# Patient Record
Sex: FEMALE | Race: WHITE | NOT HISPANIC OR LATINO | Employment: OTHER | URBAN - METROPOLITAN AREA
[De-identification: names, ages, dates, MRNs, and addresses within clinical notes are randomized per-mention and may not be internally consistent; named-entity substitution may affect disease eponyms.]

---

## 2017-01-03 ENCOUNTER — APPOINTMENT (OUTPATIENT)
Dept: OCCUPATIONAL THERAPY | Facility: CLINIC | Age: 72
End: 2017-01-03
Payer: COMMERCIAL

## 2017-01-03 PROCEDURE — 97140 MANUAL THERAPY 1/> REGIONS: CPT

## 2017-01-03 PROCEDURE — 97110 THERAPEUTIC EXERCISES: CPT

## 2017-01-05 ENCOUNTER — APPOINTMENT (OUTPATIENT)
Dept: OCCUPATIONAL THERAPY | Facility: CLINIC | Age: 72
End: 2017-01-05
Payer: COMMERCIAL

## 2017-01-05 ENCOUNTER — GENERIC CONVERSION - ENCOUNTER (OUTPATIENT)
Dept: OTHER | Facility: OTHER | Age: 72
End: 2017-01-05

## 2017-01-05 PROCEDURE — 97110 THERAPEUTIC EXERCISES: CPT

## 2017-01-05 PROCEDURE — 97140 MANUAL THERAPY 1/> REGIONS: CPT

## 2017-01-09 ENCOUNTER — APPOINTMENT (OUTPATIENT)
Dept: OCCUPATIONAL THERAPY | Facility: CLINIC | Age: 72
End: 2017-01-09
Payer: COMMERCIAL

## 2017-01-09 ENCOUNTER — ALLSCRIPTS OFFICE VISIT (OUTPATIENT)
Dept: OTHER | Facility: OTHER | Age: 72
End: 2017-01-09

## 2017-01-09 ENCOUNTER — HOSPITAL ENCOUNTER (OUTPATIENT)
Dept: RADIOLOGY | Facility: CLINIC | Age: 72
Discharge: HOME | End: 2017-01-09
Payer: COMMERCIAL

## 2017-01-09 DIAGNOSIS — S52.121A CLOSED DISPLACED FRACTURE OF HEAD OF RIGHT RADIUS: ICD-10-CM

## 2017-01-09 PROCEDURE — 97140 MANUAL THERAPY 1/> REGIONS: CPT

## 2017-01-09 PROCEDURE — 73070 X-RAY EXAM OF ELBOW: CPT

## 2017-01-09 PROCEDURE — 97110 THERAPEUTIC EXERCISES: CPT

## 2017-01-10 ENCOUNTER — APPOINTMENT (OUTPATIENT)
Dept: OCCUPATIONAL THERAPY | Facility: CLINIC | Age: 72
End: 2017-01-10
Payer: COMMERCIAL

## 2017-01-11 ENCOUNTER — APPOINTMENT (OUTPATIENT)
Dept: OCCUPATIONAL THERAPY | Facility: CLINIC | Age: 72
End: 2017-01-11
Payer: COMMERCIAL

## 2017-01-12 ENCOUNTER — APPOINTMENT (OUTPATIENT)
Dept: OCCUPATIONAL THERAPY | Facility: CLINIC | Age: 72
End: 2017-01-12
Payer: COMMERCIAL

## 2017-01-12 PROCEDURE — 97110 THERAPEUTIC EXERCISES: CPT

## 2017-01-12 PROCEDURE — 97140 MANUAL THERAPY 1/> REGIONS: CPT

## 2017-01-16 ENCOUNTER — APPOINTMENT (OUTPATIENT)
Dept: OCCUPATIONAL THERAPY | Facility: CLINIC | Age: 72
End: 2017-01-16
Payer: COMMERCIAL

## 2017-01-17 ENCOUNTER — APPOINTMENT (OUTPATIENT)
Dept: OCCUPATIONAL THERAPY | Facility: CLINIC | Age: 72
End: 2017-01-17
Payer: COMMERCIAL

## 2017-01-17 PROCEDURE — 97110 THERAPEUTIC EXERCISES: CPT

## 2017-01-18 ENCOUNTER — APPOINTMENT (OUTPATIENT)
Dept: OCCUPATIONAL THERAPY | Facility: CLINIC | Age: 72
End: 2017-01-18
Payer: COMMERCIAL

## 2017-01-19 ENCOUNTER — APPOINTMENT (OUTPATIENT)
Dept: OCCUPATIONAL THERAPY | Facility: CLINIC | Age: 72
End: 2017-01-19
Payer: COMMERCIAL

## 2017-01-19 PROCEDURE — 97140 MANUAL THERAPY 1/> REGIONS: CPT

## 2017-01-19 PROCEDURE — 97110 THERAPEUTIC EXERCISES: CPT

## 2017-01-20 ENCOUNTER — APPOINTMENT (OUTPATIENT)
Dept: OCCUPATIONAL THERAPY | Facility: CLINIC | Age: 72
End: 2017-01-20
Payer: COMMERCIAL

## 2017-01-23 ENCOUNTER — APPOINTMENT (OUTPATIENT)
Dept: OCCUPATIONAL THERAPY | Facility: CLINIC | Age: 72
End: 2017-01-23
Payer: COMMERCIAL

## 2017-01-24 ENCOUNTER — APPOINTMENT (OUTPATIENT)
Dept: OCCUPATIONAL THERAPY | Facility: CLINIC | Age: 72
End: 2017-01-24
Payer: COMMERCIAL

## 2017-01-24 PROCEDURE — 97140 MANUAL THERAPY 1/> REGIONS: CPT

## 2017-01-24 PROCEDURE — 97110 THERAPEUTIC EXERCISES: CPT

## 2017-01-25 ENCOUNTER — APPOINTMENT (OUTPATIENT)
Dept: OCCUPATIONAL THERAPY | Facility: CLINIC | Age: 72
End: 2017-01-25
Payer: COMMERCIAL

## 2017-01-26 ENCOUNTER — APPOINTMENT (OUTPATIENT)
Dept: OCCUPATIONAL THERAPY | Facility: CLINIC | Age: 72
End: 2017-01-26
Payer: COMMERCIAL

## 2017-01-26 PROCEDURE — 97110 THERAPEUTIC EXERCISES: CPT

## 2017-01-27 ENCOUNTER — APPOINTMENT (OUTPATIENT)
Dept: OCCUPATIONAL THERAPY | Facility: CLINIC | Age: 72
End: 2017-01-27
Payer: COMMERCIAL

## 2017-01-31 ENCOUNTER — APPOINTMENT (OUTPATIENT)
Dept: OCCUPATIONAL THERAPY | Facility: CLINIC | Age: 72
End: 2017-01-31
Payer: COMMERCIAL

## 2017-01-31 PROCEDURE — 97140 MANUAL THERAPY 1/> REGIONS: CPT

## 2017-01-31 PROCEDURE — 97110 THERAPEUTIC EXERCISES: CPT

## 2017-02-02 ENCOUNTER — APPOINTMENT (OUTPATIENT)
Dept: OCCUPATIONAL THERAPY | Facility: CLINIC | Age: 72
End: 2017-02-02
Payer: COMMERCIAL

## 2017-02-02 PROCEDURE — 97140 MANUAL THERAPY 1/> REGIONS: CPT

## 2017-02-02 PROCEDURE — 97110 THERAPEUTIC EXERCISES: CPT

## 2017-02-07 ENCOUNTER — GENERIC CONVERSION - ENCOUNTER (OUTPATIENT)
Dept: OTHER | Facility: OTHER | Age: 72
End: 2017-02-07

## 2017-05-22 ENCOUNTER — GENERIC CONVERSION - ENCOUNTER (OUTPATIENT)
Dept: OTHER | Facility: OTHER | Age: 72
End: 2017-05-22

## 2017-06-02 LAB
A/G RATIO (HISTORICAL): 1.5 (ref 1.2–2.2)
ALBUMIN SERPL BCP-MCNC: 4.3 G/DL (ref 3.5–4.8)
ALP SERPL-CCNC: 90 IU/L (ref 39–117)
ALT SERPL W P-5'-P-CCNC: 19 IU/L (ref 0–32)
AST SERPL W P-5'-P-CCNC: 19 IU/L (ref 0–40)
BILIRUB SERPL-MCNC: 0.4 MG/DL (ref 0–1.2)
BUN SERPL-MCNC: 16 MG/DL (ref 8–27)
BUN/CREA RATIO (HISTORICAL): 20 (ref 12–28)
CALCIUM SERPL-MCNC: 9.5 MG/DL (ref 8.7–10.3)
CHLORIDE SERPL-SCNC: 102 MMOL/L (ref 96–106)
CO2 SERPL-SCNC: 24 MMOL/L (ref 18–29)
CREAT SERPL-MCNC: 0.82 MG/DL (ref 0.57–1)
EGFR AFRICAN AMERICAN (HISTORICAL): 84 ML/MIN/1.73
EGFR-AMERICAN CALC (HISTORICAL): 73 ML/MIN/1.73
GLUCOSE SERPL-MCNC: 109 MG/DL (ref 65–99)
HBA1C MFR BLD HPLC: 6 % (ref 4.8–5.6)
POTASSIUM SERPL-SCNC: 5.3 MMOL/L (ref 3.5–5.2)
SODIUM SERPL-SCNC: 145 MMOL/L (ref 134–144)
TOT. GLOBULIN, SERUM (HISTORICAL): 2.8 G/DL (ref 1.5–4.5)
TOTAL PROTEIN (HISTORICAL): 7.1 G/DL (ref 6–8.5)

## 2017-06-03 LAB — TSH SERPL DL<=0.05 MIU/L-ACNC: 0.78 UIU/ML (ref 0.45–4.5)

## 2017-06-05 ENCOUNTER — GENERIC CONVERSION - ENCOUNTER (OUTPATIENT)
Dept: OTHER | Facility: OTHER | Age: 72
End: 2017-06-05

## 2017-06-23 ENCOUNTER — ALLSCRIPTS OFFICE VISIT (OUTPATIENT)
Dept: OTHER | Facility: OTHER | Age: 72
End: 2017-06-23

## 2017-06-23 DIAGNOSIS — Z12.31 ENCOUNTER FOR SCREENING MAMMOGRAM FOR MALIGNANT NEOPLASM OF BREAST: ICD-10-CM

## 2017-10-19 ENCOUNTER — ALLSCRIPTS OFFICE VISIT (OUTPATIENT)
Dept: OTHER | Facility: OTHER | Age: 72
End: 2017-10-19

## 2017-11-28 ENCOUNTER — GENERIC CONVERSION - ENCOUNTER (OUTPATIENT)
Dept: OTHER | Facility: OTHER | Age: 72
End: 2017-11-28

## 2017-11-28 ENCOUNTER — HOSPITAL ENCOUNTER (OUTPATIENT)
Dept: RADIOLOGY | Facility: HOSPITAL | Age: 72
Discharge: HOME | End: 2017-11-28
Attending: FAMILY MEDICINE
Payer: COMMERCIAL

## 2017-11-28 DIAGNOSIS — Z12.31 ENCOUNTER FOR SCREENING MAMMOGRAM FOR MALIGNANT NEOPLASM OF BREAST: ICD-10-CM

## 2017-11-28 PROCEDURE — G0202 SCR MAMMO BI INCL CAD: HCPCS

## 2017-12-04 ENCOUNTER — GENERIC CONVERSION - ENCOUNTER (OUTPATIENT)
Dept: OTHER | Facility: OTHER | Age: 72
End: 2017-12-04

## 2017-12-04 LAB
A/G RATIO (HISTORICAL): 1.4 (ref 1.2–2.2)
ALBUMIN SERPL BCP-MCNC: 4.2 G/DL (ref 3.5–4.8)
ALP SERPL-CCNC: 81 IU/L (ref 39–117)
ALT SERPL W P-5'-P-CCNC: 18 IU/L (ref 0–32)
AST SERPL W P-5'-P-CCNC: 16 IU/L (ref 0–40)
BILIRUB SERPL-MCNC: 0.4 MG/DL (ref 0–1.2)
BUN SERPL-MCNC: 12 MG/DL (ref 8–27)
BUN/CREA RATIO (HISTORICAL): 16 (ref 12–28)
CALCIUM SERPL-MCNC: 9.3 MG/DL (ref 8.7–10.3)
CHLORIDE SERPL-SCNC: 97 MMOL/L (ref 96–106)
CHOLEST SERPL-MCNC: 161 MG/DL (ref 100–199)
CO2 SERPL-SCNC: 26 MMOL/L (ref 18–29)
CREAT SERPL-MCNC: 0.77 MG/DL (ref 0.57–1)
EGFR AFRICAN AMERICAN (HISTORICAL): 90 ML/MIN/1.73
EGFR-AMERICAN CALC (HISTORICAL): 78 ML/MIN/1.73
GLUCOSE SERPL-MCNC: 103 MG/DL (ref 65–99)
HBA1C MFR BLD HPLC: 5.7 % (ref 4.8–5.6)
HDLC SERPL-MCNC: 44 MG/DL
LDLC SERPL CALC-MCNC: 94 MG/DL (ref 0–99)
POTASSIUM SERPL-SCNC: 4.6 MMOL/L (ref 3.5–5.2)
SODIUM SERPL-SCNC: 139 MMOL/L (ref 134–144)
TOT. GLOBULIN, SERUM (HISTORICAL): 3 G/DL (ref 1.5–4.5)
TOTAL PROTEIN (HISTORICAL): 7.2 G/DL (ref 6–8.5)
TRIGL SERPL-MCNC: 113 MG/DL (ref 0–149)

## 2017-12-05 ENCOUNTER — GENERIC CONVERSION - ENCOUNTER (OUTPATIENT)
Dept: OTHER | Facility: OTHER | Age: 72
End: 2017-12-05

## 2017-12-05 LAB
INTERPRETATION (HISTORICAL): NORMAL
LYME IGG/IGM AB (HISTORICAL): <0.91 ISR (ref 0–0.9)
LYME IGM (HISTORICAL): <0.8 INDEX (ref 0–0.79)
TSH SERPL DL<=0.05 MIU/L-ACNC: 1.14 UIU/ML (ref 0.45–4.5)

## 2017-12-06 ENCOUNTER — GENERIC CONVERSION - ENCOUNTER (OUTPATIENT)
Dept: OTHER | Facility: OTHER | Age: 72
End: 2017-12-06

## 2017-12-15 ENCOUNTER — ALLSCRIPTS OFFICE VISIT (OUTPATIENT)
Dept: OTHER | Facility: OTHER | Age: 72
End: 2017-12-15

## 2017-12-16 NOTE — PROGRESS NOTES
Assessment  1  Benign essential hypertension (401 1) (I10)   2  GE reflux (530 81) (K21 9)   3  Hyperlipidemia LDL goal <130 (272 4) (E78 5)   4  Hypothyroidism (244 9) (E03 9)   5  IFG (impaired fasting glucose) (790 21) (R73 01)   6  Encounter for preventive health examination (V70 0) (Z00 00)    Plan  Benign essential hypertension, Health Maintenance, GE reflux, Hyperlipidemia LDL goal<130, Hypothyroidism, IFG (impaired fasting glucose)    · (1) COMPREHENSIVE METABOLIC PANEL; Status:Active; Requested for:29Pxz3108;    · (1) LIPID PANEL FASTING W DIRECT LDL REFLEX; Status:Active; Requestedfor:76Wcf3138;    · (1) TSH; Status:Active; Requested for:38Yqz2666;   Screening for genitourinary condition    · *VB - Urinary Incontinence Screen (Dx Z13 89 Screen for UI); Status:Complete;   Done:15Vgk2824 09:17AM    Discussion/Summary  The patient was counseled regarding risk factor reductions,-- impressions,-- risks and benefits of treatment options  Possible side effects of new medications were reviewed with the patient/guardian today  The treatment plan was reviewed with the patient/guardian  The patient/guardian understands and agrees with the treatment plan      Chief Complaint  Seen for a f/u on Meds  er/cma  History of Present Illness  htn/chol/thyroid stablefollowing diet somewhatnot counting caloriesoccas exerciseno cpno myalgiasno palpitations      Review of Systems   Constitutional: no fever-- and-- no chills  Neurological: no dizziness-- and-- no fainting  Preventive Quality 65 and Older: The patient currently has no urinary incontinence symptoms  Active Problems  1  Alopecia (704 00) (L65 9)   2  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)   3  Benign essential hypertension (401 1) (I10)   4  BMI 30 0-30 9,adult (V85 30) (Z68 30)   5  Cataract (366 9) (H26 9)   6  Cervical radiculopathy (723 4) (M54 12)   7  Colon cancer screening (V76 51) (Z12 11)   8  Depression screening (V79 0) (Z13 89)   9   GE reflux Occupational Therapy Discharge Summary    Pt d/c home at overall Mod I level with use of w/c as primary means of mobility  Pt participated in prosthetic training and recommended continued OP services to max indep with use of prosthetic for ADL fxn and xfers  Pt with no further OT needs at this time  (530 81) (K21 9)   10  Hyperlipidemia LDL goal <130 (272 4) (E78 5)   11  Hypothyroidism (244 9) (E03 9)   12  IFG (impaired fasting glucose) (790 21) (R73 01)   13  Immunization due (V05 9) (Z23)   14  Menopause (627 2) (Z78 0)   15  Obesity (BMI 30-39 9) (278 00) (E66 9)   16  Osteopenia (733 90) (M85 80)   17  Other screening mammogram (V76 12) (Z12 31)   18  Screening for cardiovascular, respiratory, and genitourinary diseases  (V81 2,V81 4,V81 6) (Z13 6,Z13 83,Z13 89)   19  Screening for neurological condition (V80 09) (Z13 89)    Past Medical History  1  History of Acute upper respiratory infection (465 9) (J06 9)   2  History of Acute upper respiratory infection (465 9) (J06 9)   3  History of 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)   4  History of Backache (724 5) (M54 9)   5  History of Fracture of radial head, right, closed (813 05) (S52 121A)   6  History of abdominal pain (V13 89) (Z87 898)   7  History of acute bronchitis (V12 69) (Z87 09)   8  History of acute bronchitis (V12 69) (Z87 09)   9  History of backache (V13 59) (Z87 39)   10  History of low back pain (V13 59) (Z87 39)   11  History of onychomycosis (V12 09) (Z86 19)   12  History of pleurisy (V12 69) (Z87 09)   13  History of Joint pain, knee (719 46) (M25 569)   14  History of Late effect of sprain and strain (905 7)   15  History of Late Effects Of Sprain Or Strain (905 7)   16  Otitis media, unspecified laterality   17  Preop examination (V72 84) (Z01 818)   18  History of Screening for malignant neoplasm of breast (V76 10) (Z12 31)   19  History of Screening for malignant neoplasm of breast (V76 10) (Z12 31)   20  History of Sprained Ribs (848 3)   21  History of Tick bite, initial encounter (919 4,E906 4) (W57 XXXA)   22  History of Tinea corporis (110 5) (B35 4)    Surgical History  1  History of Cataract Surgery    Family History  Sister    1   Family history of diabetes mellitus (V18 0) (Z83 3)    The family history was reviewed and updated today  Social History   · Never a smoker  The social history was reviewed and updated today  Current Meds   1  AmLODIPine Besylate 5 MG Oral Tablet; Take 1 tablet daily; Therapy: 65BTQ5962 to (Evaluate:18Jun2018)  Requested for: 03ABK6867; Last Rx:23Jun2017 Ordered   2  Atenolol 25 MG Oral Tablet; take 1 tablet by mouth once daily; Therapy: 86WIS4515 to (Evaluate:10Mar2018)  Requested for: 68QGM7947; Last Rx:15Mar2017 Ordered   3  Atorvastatin Calcium 20 MG Oral Tablet; take 1 tablet by mouth once daily; Therapy: 48PLT1207 to (Evaluate:10Jun2018)  Requested for: 55YMH8949; Last Rx:15Jun2017 Ordered   4  Levothyroxine Sodium 100 MCG Oral Tablet; take 1 tablet by mouth once daily; Therapy: 48Zde1932 to (Melissa Cai)  Requested for: 78RWP6502; Last Rx:26Nov2017 Ordered   5  Losartan Potassium-HCTZ 100-12 5 MG Oral Tablet; take 1 tablet by mouth once daily; Therapy: 65Sse7085 to (Evaluate:72Qpt6908)  Requested for: 59DJQ3418; Last Rx:21Nyx3643 Ordered    Allergies  1  Amoxicillin TABS    Vitals  Vital Signs    Recorded: 89GFU3182 09:15AM   Temperature 96 8 F   Heart Rate 76   Respiration 20   Systolic 246   Diastolic 80   Height 5 ft 2 in   Weight 171 lb    BMI Calculated 31 28   BSA Calculated 1 79     Physical Exam   Constitutional  General appearance: No acute distress, well appearing and well nourished  Eyes  Conjunctiva and lids: No swelling, erythema or discharge  Pupils and irises: Equal, round and reactive to light  Ears, Nose, Mouth, and Throat  External inspection of ears and nose: Normal    Otoscopic examination: Tympanic membranes translucent with normal light reflex  Canals patent without erythema  Nasal mucosa, septum, and turbinates: Normal without edema or erythema  Oropharynx: Normal with no erythema, edema, exudate or lesions  Pulmonary  Respiratory effort: No increased work of breathing or signs of respiratory distress  Auscultation of lungs: Clear to auscultation  Cardiovascular  Auscultation of heart: Normal rate and rhythm, normal S1 and S2, without murmurs  Examination of extremities for edema and/or varicosities: Normal    Carotid pulses: Normal    Abdomen  Abdomen: Non-tender, no masses  Lymphatic  Palpation of lymph nodes in neck: No lymphadenopathy  Musculoskeletal  Gait and station: Normal    Digits and nails: Normal without clubbing or cyanosis  Skin  Skin and subcutaneous tissue: Normal without rashes or lesions  Psychiatric  Mood and affect: Normal          Results/Data  PHQ-2 Adult Depression Screening 52Noi8062 09:17AM User, [facility]     Test Name Result Flag Reference   PHQ-2 Adult Depression Score 0     Over the last two weeks, how often have you been bothered by any of the following problems? Little interest or pleasure in doing things: Not at all - 0 Feeling down, depressed, or hopeless: Not at all - 0   PHQ-2 Adult Depression Screening Negative       Falls Risk Assessment (Dx Z13 89 Screen for Neurologic Disorder) 94BZK8910 09:17AM User, [facility]     Test Name Result Flag Reference   Falls Risk      No falls in the past year     *VB - Urinary Incontinence Screen (Dx Z13 89 Screen for UI) 06HDL6153 09:17AM Yvette Weiner     Test Name Result Flag Reference   Urinary Incontinence Assessment 74IYS3076         Provider Comments    htn/chol/thyroid stableprediabetes aware/advisedlabs reviewedDiet/exercise/weight loss is recommended  Health Management  Menopause   * MAMMO SCREENING BILATERAL W CAD; every 1 year; Last 15RBH0759; Next Due: 34SVW7438; Active  Other screening mammogram   * MAMMO SCREENING BILATERAL W CAD; every 1 year; Last 69DUU5161; Next Due: 25JEY0977; Active    Future Appointments    Date/Time Provider Specialty Site   06/18/2018 10:45 AM Yvette Weiner, [address] [place] [address]     Signatures   Electronically signed by : Sheba Epstein DO; Dec 15 2017  9:59PM EST                       (Author)

## 2018-01-09 NOTE — RESULT NOTES
Verified Results  * XR ELBOW 3+ VIEW RIGHT 63DOT8752 02:52PM Saravanan Ken     Test Name Result Flag Reference   XR ELBOW 3+ VW RIGHT (Report)     C-ARM - RIGHT ELBOW     INDICATION: Right radial head arthroplasty  COMPARISON: 11/27/2016  TECHNIQUE:     FLUOROSCOPY TIME: 14 2 seconds     3 FLUOROSCOPIC IMAGES     FINDINGS:     Fluoroscopy is provided via the C-arm for placement of a radial head prosthesis for comminuted fracture  Alignment is anatomic for healing  Osseous and soft tissue detail limited by technique  IMPRESSION:     Portable fluoroscopy provided for radial head prosthesis placement  Please refer to the separate procedure notes for additional details         Workstation performed: [id number]     Signed by:   Sunshine Garcia MD   12/6/16

## 2018-01-11 NOTE — RESULT NOTES
Verified Results  * MRI CERVICAL SPINE WO CONTRAST 00QAZ3243 07:46AM Keegan Wallace     Test Name Result Flag Reference   MRI CERVICAL SPINE [address] (Report)     This is a summary report  The complete report is available in the patient's medical record  If you cannot access the medical record, please contact the sending organization for a detailed fax or copy  MRI CERVICAL SPINE WITHOUT CONTRAST     INDICATION: Neck pain  Left arm numbness and tingling for one week  COMPARISON: None  TECHNIQUE: Sagittal T1, sagittal T2, sagittal inversion recovery, axial T2, axial 2D merge        IMAGE QUALITY: Mild motion artifact partially limits the study  FINDINGS:     ALIGNMENT: There is nonspecific straightening of the cervical lordosis without subluxation  MARROW SIGNAL: Mild scattered degenerative endplate changes noted  No bone marrow edema  No focally suspicious marrow lesions or compression abnormality  CERVICAL AND VISUALIZED THORACIC CORD: Normal signal within the visualized cord  PREVERTEBRAL AND PARASPINAL SOFT TISSUES: Prevertebral and paraspinal soft tissues are unremarkable  VISUALIZED POSTERIOR FOSSA: The visualized posterior fossa demonstrates no abnormal signal      CERVICAL DISC SPACES:        C2-C3: Normal      C3-C4: There is a disc osteophyte complex  Mild tricompartmental narrowing  C4-C5: There is a disc osteophyte complex  Very mild tricompartmental narrowing  C5-C6: There is a disc osteophyte complex  Mild to moderate central canal narrowing  Moderate left neural foraminal narrowing  Mild right neural foraminal narrowing  C6-C7: Small central disc herniation, protrusion type  Mild central canal narrowing  Mild bilateral uncovertebral hypertrophy and mild bilateral neural foraminal narrowing  C7-T1: Normal      UPPER THORACIC DISC SPACES: Normal        IMPRESSION:       1   Mild to moderate multilevel spondylosis without cord compression or cord signal abnormality  2  There is nonspecific straightening of the cervical lordosis without subluxation  Workstation performed: [id number]     Signed by: Mitchell Gusman MD   3/3/16       Discussion/Summary   MRI of neck showed some arthritis changes but no evidence of "pinched nerve"    Dr Pretty Horn

## 2018-01-13 VITALS
SYSTOLIC BLOOD PRESSURE: 148 MMHG | HEIGHT: 62 IN | TEMPERATURE: 98.5 F | WEIGHT: 169 LBS | RESPIRATION RATE: 20 BRPM | BODY MASS INDEX: 31.1 KG/M2 | DIASTOLIC BLOOD PRESSURE: 90 MMHG | HEART RATE: 60 BPM

## 2018-01-13 NOTE — RESULT NOTES
Discussion/Summary   Please keep your office visit as scheduled        Dr Fredda Phoenix        Verified Results  (1) COMPREHENSIVE METABOLIC PANEL 16VPR6149 32:76FS Donnell Lundberg     Test Name Result Flag Reference   Glucose, Serum 109 mg/dL H 65-99   BUN 16 mg/dL  8-27   Creatinine, Serum 0 82 mg/dL  0 57-1 00   BUN/Creatinine Ratio 20  12-28   Sodium, Serum 145 mmol/L H 134-144   Potassium, Serum 5 3 mmol/L H 3 5-5 2   Chloride, Serum 102 mmol/L     Carbon Dioxide, Total 24 mmol/L  18-29   Calcium, Serum 9 5 mg/dL  8 7-10 3   Protein, Total, Serum 7 1 g/dL  6 0-8 5   Albumin, Serum 4 3 g/dL  3 5-4 8   Globulin, Total 2 8 g/dL  1 5-4 5   A/G Ratio 1 5  1 2-2 2   Bilirubin, Total 0 4 mg/dL  0 0-1 2   Alkaline Phosphatase, S 90 IU/L     AST (SGOT) 19 IU/L  0-40   ALT (SGPT) 19 IU/L  0-32   eGFR If NonAfricn Am 73 mL/min/1 73  >59   eGFR If Africn Am 84 mL/min/1 73  >59     (1) HEMOGLOBIN A1C 02Jun2017 07:24AM Donnell Lundberg     Test Name Result Flag Reference   Hemoglobin A1c 6 0 % H 4 8-5 6   Pre-diabetes: 5 7 - 6 4           Diabetes: >6 4           Glycemic control for adults with diabetes: <7 0     (1) TSH 57LFR1162 07:24AM Donnell Lundberg     Test Name Result Flag Reference   TSH 0 779 uIU/mL  0 450-4 500

## 2018-01-14 VITALS
SYSTOLIC BLOOD PRESSURE: 128 MMHG | HEIGHT: 62 IN | DIASTOLIC BLOOD PRESSURE: 76 MMHG | TEMPERATURE: 98.3 F | WEIGHT: 169 LBS | RESPIRATION RATE: 20 BRPM | BODY MASS INDEX: 31.1 KG/M2 | HEART RATE: 76 BPM

## 2018-01-15 NOTE — RESULT NOTES
Verified Results  * DXA BONE DENSITY SPINE HIP AND PELVIS 22Nov2016 10:30AM Della Aguila Order Number: [id number]    Order Number: [id number]     Test Name Result Flag Reference   DXA BONE DENSITY SPINE HIP AND PELVIS (Report)     CENTRAL DXA SCAN     CLINICAL HISTORY:  80year old post-menopausal  female risk factors include estrogen deficient, follow-up     TECHNIQUE: Bone densitometry was performed using a Intelipost bone densitometer  Regions of interest appear properly placed  There are no obvious fractures or other confounding variables which could limit the study  Lumbar dextroscoliosis noted  COMPARISON: 2012     RESULTS:    LUMBAR SPINE: L1, L3 and L4:   BMD 1 480 gm/cm2   T-score 2 4   Z-score 4 0     LEFT TOTAL HIP:   BMD 1 0 85 gm/cm2   T-score 0 6   Z-score 2 1     LEFT FEMORAL NECK:   BMD 0 988 gm/cm2   T-score -0 4   Z-score 1 3     RIGHT TOTAL HIP:   BMD 1 010 gm/cm2   T-score 0 0   Z-score 1 5     RIGHT FEMORAL NECK:   BMD 0 884 gm/cm2   T-score -1 1   Z-score 0 6       IMPRESSION:   1  Based on the [facility] classification, the T-score of -1 1 in the right femoral neck is consistent with low bone mineral density  2  Since the prior study, there has been a significant increase of the BMD in the lumbar spine of 0 079 gm/cm2 or 5 6  In the median hips, there has been a significant increase in BMD of 0 032 gm/cm2 or 3 2%  This increase does exceed our own least    significant change and, therefore, is statistically significant within 95% confidence level  3  Any secondary causes of low bone mineral density should be excluded prior to treatment, if clinically indicated  4  A daily intake of at least 1200 mg calcium and 800 to 1000 IU of Vitamin D, as well as weight bearing and muscle strengthening exercise, fall prevention and avoidance of tobacco and excessive alcohol intake as basic preventive measures are suggested     5  Repeat DXA in 18 - 24 months, on the same machine, as clinically indicated  The 10 year risk of hip fracture is 0 9%, with the 10 year risk of major osteoporotic fracture being 8 6%, as calculated by the [facility] fracture risk assessment tool (FRAX)  The current NOF guidelines recommend treating patients with FRAX 10 year risk score   of >3% for hip fracture and >20% for major osteoporotic fracture  WHO CLASSIFICATION:   Normal (a T-score of -1 0 or higher)   Low bone mineral density (a T-score of less than -1 0 but higher than -2 5)   Osteoporosis (a T-score of -2 5 or less)   Severe osteoporosis (a T-score of -2 5 or less with a fragility fracture)             Workstation performed: [id number]     Signed by:   Manjula Malone MD   11/22/16     * MAMMO SCREENING BILATERAL W CAD 22TBK3055 10:30AM Teebubba Rosas Order Number: [id number]    Order Number: [id number]     Test Name Result Flag Reference   MAMMO SCREENING BILATERAL W CAD (Report)     Patient History:   Patient's BMI is 31 1  Reason for exam: screening (asymptomatic)  Screening     Mammo Screening Bilateral W CAD: November 22, 2016 - Check In #:    [de-identified]   Bilateral CC and MLO view(s) were taken  Technologist: ANDRES Mackey   Prior study comparison: December 17, 2015, [facility] screening    bilateral digital robert performed at [place] [address]  December 16, 2014, bilateral screening mammogram    performed at [place] [address]  December 4, 2013, bilateral screening mammogram performed at [place] [address]  November 29, 2012, bilateral screening   mammogram performed at [place] [address]  November 21, 2011, bilateral screening mammogram performed at [place] [address]  The breast tissue is almost entirely fat  Bilateral digital    mammography is performed  No dominant soft tissue mass,    architectural distortion or suspicious calcifications are noted      The skin and nipple contours are within normal limits  Scattered benign appearing calcifications are noted  No evidence    of malignancy  No significant changes when compared to prior    studies  1  No evidence of malignancy  2  No significant change when compared with the prior study  ASSESSMENT: BiRad:2 - Benign     Recommendation:   Routine screening mammogram of both breasts in 1 year  A reminder letter will be scheduled   Analyzed by CAD     8-10% of cancers will be missed on mammography  Management of a    palpable abnormality must be based on clinical grounds  Patients   will be notified of their results via letter from our facility  Accredited by [employer] of [employer] and FDA  Transcription Location: [facility] 98: [id number]     Risk Value(s):   Tyrer-Cuzick 10 Year: 2 516%, Tyrer-Cuzick Lifetime: 4 305%,    Myriad Table: 1 5%, STEVEN 5 Year: 1 1%, NCI Lifetime: 3 5%   Signed by:   Godfrey Arciniega MD   11/22/16       Discussion/Summary   The DEXA scan showed you bone density is in the category of "osteopenia", which is between normal bone density and osteoporosis  This is a risk for fracture but not considered high-risk  Dr Larisa Allen is normal   Repeat in one year is recommended     Dr Cece Villaseñor

## 2018-01-16 NOTE — RESULT NOTES
Discussion/Summary   Mammogram is normal   Repeat in one year is recommended  Dr Hernandez Rosado CAD 78BUW8326 09:11AM Tessyleena Bettencourt Order Number: [id number]    - Patient Instructions: To schedule this appointment, please contact Central Scheduling at [phone]  Do not wear any perfume, powder, lotion or deodorant on breast or underarm area  Please bring your doctors order, referral (if needed) and insurance information with you on the day of the test  Failure to bring this information may result in this test being rescheduled  Arrive 15 minutes prior to your appointment time to register  On the day of your test, please bring any prior mammogram or breast studies with you that were not performed at a [facility]  Failure to bring prior exams may result in your test needing to be rescheduled  Test Name Result Flag Reference   MAMMO SCREENING BILATERAL W CAD (Report)     Patient History:   Patient's BMI is 31 1  Reason for exam: screening, asymptomatic  Mammo Screening Bilateral W CAD: November 28, 2017 - Check In #:    [de-identified]   Bilateral CC and MLO view(s) were taken  Technologist: FARHAN Mccray   Prior study comparison: November 22, 2016, mammo screening    bilateral W CAD performed at [facility]  December 17, 2015, [facility] screening bilateral digital robert    performed at [facility]  December 16, 2014, bilateral screening mammogram performed at [facility]  December 4, 2013, bilateral screening    mammogram performed at [facility]  November 29, 2012, bilateral screening mammogram performed at [facility]  The breast tissue is almost entirely fat  No new dominant soft    tissue mass, architectural distortion or suspicious    calcifications are noted   The skin and nipple contours are    within normal limits  No evidence of malignancy  No significant changes   when compared with prior studies  ACR BI-RADSï¾® Assessments: BiRad:1 - Negative     Recommendation:   Routine screening mammogram of both breasts in 1 year  Analyzed by CAD     The patient is scheduled in a reminder system for screening    mammography  8-10% of cancers will be missed on mammography  Management of a    palpable abnormality must be based on clinical grounds  Patients   will be notified of their results via letter from our facility  Accredited by [employer] of [employer] and FDA  Transcription Location: [facility] 143: [id number]     Risk Value(s):   Tyrer-Cuzick 10 Year: 2 400%, Tyrer-Cuzick Lifetime: 3 800%,    Myriad Table: 1 5%, STEVEN 5 Year: 1 1%, NCI Lifetime: 3 3%   Signed by:   Daiana Camarillo MD   11/28/17       Plan  Osteoporosis screening, Other screening mammogram    · * MAMMO SCREENING BILATERAL W CAD ; every 1 year;  Last 00EXG6067; Next  46QWH5678; Status:Active

## 2018-01-16 NOTE — RESULT NOTES
Verified Results  [facility]) CBC+Platelet+Hem Review [id number] 09:09AM [facility] Hem     Test Name Result Flag Reference   WBC 6 8 x10E3/uL  3 4-10 8   RBC 4 68 x10E6/uL  3 77-5 28   Hemoglobin 13 9 g/dL  11 1-15 9   Hematocrit 42 9 %  34 0-46  6   MCV 92 fL  79-97   MCH 29 7 pg  26 6-33 0   MCHC 32 4 g/dL  31 5-35 7   RDW 13 7 %  12 3-15 4   Platelets 738 F29E8/PK  150-379   Neutrophils 68 %     Lymphs 24 %     Monocytes 5 %     Eos 2 %     Basos 1 %     Neutrophils Absolute 4 6 X10E3/uL  1 4-7 0   Lymphs (Absolute) 1 6 X10E3/uL  0 7-3 1   Monocytes(Absolute) 0 3 X10E3/uL  0 1-0 9   Eos (Absolute Value) 0 1 X10E3/uL  0 0-0 4   Baso(Absolute) 0 1 X10E3/uL  0 0-0 2   RBC Comment RBC's appear normal   Normal   Platelet Comment Comment  Adequate   Platelet count verified by examination of peripheral blood smear  [facility]) CMP14+eGFR [id number] 09:09AM [facility] Hem     Test Name Result Flag Reference   Glucose, Serum 103 mg/dL H 65-99   BUN 13 mg/dL  8-27   Creatinine, Serum 0 81 mg/dL  0 57-1 00   eGFR If NonAfricn Am 74 mL/min/1 73  >59   eGFR If Africn Am 86 mL/min/1 73  >59   BUN/Creatinine Ratio 16  11-26   Sodium, Serum 141 mmol/L  134-144   Potassium, Serum 5 1 mmol/L  3 5-5 2   Chloride, Serum 100 mmol/L     Carbon Dioxide, Total 25 mmol/L  18-29   Calcium, Serum 9 8 mg/dL  8 7-10 3   Protein, Total, Serum 7 4 g/dL  6 0-8 5   Albumin, Serum 4 3 g/dL  3 6-4 8   Globulin, Total 3 1 g/dL  1 5-4 5   A/G Ratio 1 4  1 1-2 5   Bilirubin, Total 0 3 mg/dL  0 0-1 2   Alkaline Phosphatase, S 87 IU/L     AST (SGOT) 24 IU/L  0-40   ALT (SGPT) 29 IU/L  0-32     (LC) Lipid Panel [id number] 09:09AM Mohit Hem     Test Name Result Flag Reference   Cholesterol, Total 170 mg/dL  100-199   Triglycerides 103 mg/dL  0-149   HDL Cholesterol 49 mg/dL  >39   According to ATP-III Guidelines, HDL-C >59 mg/dL is considered a  negative risk factor for CHD     VLDL Cholesterol Narciso 21 mg/dL  5-40   LDL Cholesterol Calc 100 mg/dL H 0-99     (LC) TSH+Free T4 [id number] 09:09AM Jeffrey Cormier     Test Name Result Flag Reference   TSH 0 954 uIU/mL  0 450-4 500   T4,Free(Direct) 1 30 ng/dL  0 82-1 77     (1) IRON SATURATION %, TIBC 25IXZ1843 09:09AM Jeffrey Cormeir     Test Name Result Flag Reference   Iron Bind  Cap (TIBC) 347 ug/dL  250-450   UIBC 257 ug/dL  118-369   Iron, Serum 90 ug/dL     Iron Saturation 26 %  15-55     (LC) Hemoglobin A1c 91AVY4694 09:09AM Jeffrey Cormier     Test Name Result Flag Reference   Hemoglobin A1c 6 3 %Hb     Reference Range:  American Diabetes Association (ADA) Guidelines:  <5 7: Decreased risk for diabetes  5 7 - 6 4: Increased risk for diabetes  >6 4: Ongoing Hyperglycemia of any cause  <7 0: Glycemic control for adults with diabetes   Estimated Average Glucose 134 mg/dL         Discussion/Summary   Iron levels are good  Sugar is in prediabetes range  Kidneys and liver are normal    Cholesterol is good  Thyroid is good  Blood count normal    Please schedule an office appointment for follow up    Dr Jelani Walker

## 2018-01-23 VITALS
SYSTOLIC BLOOD PRESSURE: 122 MMHG | TEMPERATURE: 96.8 F | DIASTOLIC BLOOD PRESSURE: 80 MMHG | RESPIRATION RATE: 20 BRPM | WEIGHT: 171 LBS | HEIGHT: 62 IN | BODY MASS INDEX: 31.47 KG/M2 | HEART RATE: 76 BPM

## 2018-01-23 NOTE — RESULT NOTES
Verified Results  (1) COMPREHENSIVE METABOLIC PANEL [id number] 64:71MY Taryn Caceres     Test Name Result Flag Reference   Glucose, Serum 103 mg/dL H 65-99   BUN 12 mg/dL  8-27   Creatinine, Serum 0 77 mg/dL  0 57-1 00   BUN/Creatinine Ratio 16  12-28   Sodium, Serum 139 mmol/L  134-144   Potassium, Serum 4 6 mmol/L  3 5-5 2   Chloride, Serum 97 mmol/L     Carbon Dioxide, Total 26 mmol/L  18-29   Calcium, Serum 9 3 mg/dL  8 7-10 3   Protein, Total, Serum 7 2 g/dL  6 0-8 5   Albumin, Serum 4 2 g/dL  3 5-4 8   Globulin, Total 3 0 g/dL  1 5-4 5   A/G Ratio 1 4  1 2-2 2   Bilirubin, Total 0 4 mg/dL  0 0-1 2   Alkaline Phosphatase, S 81 IU/L     AST (SGOT) 16 IU/L  0-40   ALT (SGPT) 18 IU/L  0-32   eGFR If NonAfricn Am 78 mL/min/1 73  >59   eGFR If Africn Am 90 mL/min/1 73  >59

## 2018-01-23 NOTE — PROGRESS NOTES
Assessment    1  Benign essential hypertension (401 1) (I10)   2  GE reflux (530 81) (K21 9)   3  Hyperlipidemia LDL goal <130 (272 4) (E78 5)   4  Hypothyroidism (244 9) (E03 9)   5  IFG (impaired fasting glucose) (790 21) (R73 01)   6  Encounter for preventive health examination (V70 0) (Z00 00)    Plan  Benign essential hypertension, Health Maintenance, GE reflux, Hyperlipidemia LDL goal  <130, Hypothyroidism, IFG (impaired fasting glucose)    · (1) COMPREHENSIVE METABOLIC PANEL; Status:Active; Requested for:84Taa8828;    · (1) LIPID PANEL FASTING W DIRECT LDL REFLEX; Status:Active; Requested  for:99Reu2189;    · (1) TSH; Status:Active; Requested for:27Jsy0235;   Screening for genitourinary condition    · *VB - Urinary Incontinence Screen (Dx Z13 89 Screen for UI); Status:Complete;   Done:  95VPK6657 09:17AM    Discussion/Summary  Impression: Subsequent Annual Wellness Visit  Cardiovascular screening and counseling: the risks and benefits of screening were discussed  Diabetes screening and counseling: the risks and benefits of screening were discussed  Colorectal cancer screening and counseling: the risks and benefits of screening were discussed  Immunizations: influenza vaccine is up to date this year and the lifetime pneumococcal vaccine has been completed  Advance Directive Planning: complete and up to date  Patient Discussion: plan discussed with the patient  History of Present Illness  The patient is being seen for the subsequent annual wellness visit  Medicare Screening and Risk Factors   Hospitalizations: no previous hospitalizations  Medicare Screening Tests Risk Questions   Abdominal aortic aneurysm risk assessment: none indicated  Osteoporosis risk assessment: , female gender and over 48years of age  HIV risk assessment: none indicated  Drug and Alcohol Use: The patient has never smoked cigarettes  The patient reports occasional alcohol use   She has never used illicit drugs    Diet and Physical Activity: Current diet includes low fat food choices  She exercises infrequently  Exercise: walking  Mood Disorder and Cognitive Impairment Screening: PHQ-9 Depression Scale   Over the past 2 weeks, how often have you been bothered by the following problems? 1 ) Little interest or pleasure in doing things? Not at all    2 ) Feeling down, depressed or hopeless? Not at all  Functional Ability/Level of Safety: She denies hearing difficulties  She does not use a hearing aid  Activities of daily living details: no transportation help needed, does not need help managing medications and does not need help managing money  Advance Directives: Advance directives: living will and durable power of  for health care directives  Co-Managers and Medical Equipment/Suppliers: See Patient Care Team      Patient Care Team    Care Team Member Role Specialty Office Number   Vik Cardenas MD Specialist Gastroenterology Adult [phone]   Aleksandar Schmidt MD Specialist Ophthalmology [phone]     Active Problems    1  Alopecia (704 00) (L65 9)   2  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)   3  Benign essential hypertension (401 1) (I10)   4  BMI 30 0-30 9,adult (V85 30) (Z68 30)   5  Cataract (366 9) (H26 9)   6  Cervical radiculopathy (723 4) (M54 12)   7  Colon cancer screening (V76 51) (Z12 11)   8  Depression screening (V79 0) (Z13 89)   9  GE reflux (530 81) (K21 9)   10  Hyperlipidemia LDL goal <130 (272 4) (E78 5)   11  Hypothyroidism (244 9) (E03 9)   12  IFG (impaired fasting glucose) (790 21) (R73 01)   13  Immunization due (V05 9) (Z23)   14  Menopause (627 2) (Z78 0)   15  Obesity (BMI 30-39 9) (278 00) (E66 9)   16  Osteopenia (733 90) (M85 80)   17  Other screening mammogram (V76 12) (Z12 31)   18  Screening for cardiovascular, respiratory, and genitourinary diseases    (V81 2,V81 4,V81 6) (Z13 6,Z13 83,Z13 89)   19  Screening for genitourinary condition (V81 6) (Z13 89)   20  Screening for neurological condition (V80 09) (Z13 89)    Past Medical History    1  History of Acute upper respiratory infection (465 9) (J06 9)   2  History of Acute upper respiratory infection (465 9) (J06 9)   3  History of 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)   4  History of Backache (724 5) (M54 9)   5  History of Fracture of radial head, right, closed (813 05) (S52 121A)   6  History of abdominal pain (V13 89) (Z87 898)   7  History of acute bronchitis (V12 69) (Z87 09)   8  History of acute bronchitis (V12 69) (Z87 09)   9  History of backache (V13 59) (Z87 39)   10  History of low back pain (V13 59) (Z87 39)   11  History of onychomycosis (V12 09) (Z86 19)   12  History of pleurisy (V12 69) (Z87 09)   13  History of Joint pain, knee (719 46) (M25 569)   14  History of Late effect of sprain and strain (905 7)   15  History of Late Effects Of Sprain Or Strain (905 7)   16  Otitis media, unspecified laterality   17  Preop examination (V72 84) (Z01 818)   18  History of Screening for malignant neoplasm of breast (V76 10) (Z12 31)   19  History of Screening for malignant neoplasm of breast (V76 10) (Z12 31)   20  History of Sprained Ribs (848 3)   21  History of Tick bite, initial encounter (919 4,E906 4) (W57 XXXA)   22  History of Tinea corporis (110 5) (B35 4)    Surgical History    1  History of Cataract Surgery    Family History  Sister    1  Family history of diabetes mellitus (V18 0) (Z83 3)    The family history was reviewed and updated today  Social History    · Never a smoker  The social history was reviewed and updated today  Current Meds   1  AmLODIPine Besylate 5 MG Oral Tablet; Take 1 tablet daily; Therapy: 87VXZ1692 to (Evaluate:18Jun2018)  Requested for: 57DDT9091; Last   Rx:23Jun2017 Ordered   2  Atenolol 25 MG Oral Tablet; take 1 tablet by mouth once daily; Therapy: 53EEJ8003 to (Evaluate:10Mar2018)  Requested for: 34KHL6230; Last   Rx:15Mar2017 Ordered   3   Atorvastatin Calcium 20 MG Oral Tablet; take 1 tablet by mouth once daily; Therapy: 99MXF0912 to (Evaluate:10Jun2018)  Requested for: 50QIM6352; Last   Rx:15Jun2017 Ordered   4  Levothyroxine Sodium 100 MCG Oral Tablet; take 1 tablet by mouth once daily; Therapy: 35Nxs1468 to (Jessica Snider)  Requested for: 63RSC0640; Last   Rx:26Nov2017 Ordered   5  Losartan Potassium-HCTZ 100-12 5 MG Oral Tablet; take 1 tablet by mouth once daily; Therapy: 80Qog2617 to (Evaluate:52Lfn2385)  Requested for: 50VZE3888; Last   Rx:02Oct2017 Ordered    Allergies    1  Amoxicillin TABS    Immunizations  Influenza --- Arvid Dolin: 09-Nov-2005; Everardo Alaniz: [id number] Ishihara: 15-Oct-2014; Lizandro Jennifer:  20-Oct-2015; Series5: 04-Oct-2016; Series6: 22-Sep-2017   PCV --- Series1: 14-Dec-2015   PPSV --- Series1: 17-Oct-2012   Tdap --- Series1: 21-Aug-2007   Zoster --- Series1: 17-Oct-2012     Vitals  Signs   Recorded: 14QHQ0916 09:15AM   Temperature: 96 8 F  Heart Rate: 76  Respiration: 20  Systolic: 907  Diastolic: 80  Height: 5 ft 2 in  Weight: 171 lb   BMI Calculated: 31 28  BSA Calculated: 1 79    Results/Data  PHQ-2 Adult Depression Screening 20GNU3798 09:17AM User, [email or web address]     Test Name Result Flag Reference   PHQ-2 Adult Depression Score 0     Over the last two weeks, how often have you been bothered by any of the following problems? Little interest or pleasure in doing things: Not at all - 0  Feeling down, depressed, or hopeless: Not at all - 0   PHQ-2 Adult Depression Screening Negative       Falls Risk Assessment (Dx Z13 89 Screen for Neurologic Disorder) 90KGV5532 09:17AM User, Ahs     Test Name Result Flag Reference   Falls Risk      No falls in the past year     *VB - Urinary Incontinence Screen (Dx Z13 89 Screen for UI) 70SQN0189 09:17AM [facility]     Test Name Result Flag Reference   Urinary Incontinence Assessment 94Wvc7089         Health Management  Menopause   * MAMMO SCREENING BILATERAL W CAD; every 1 year;  Last 36LMB2487; Next Due: 68HGU9238; Active  Other screening mammogram   * MAMMO SCREENING BILATERAL W CAD; every 1 year; Last 57PDP7479; Next Due: 26ESW1659;   Active    Future Appointments    Date/Time Provider Specialty Site   06/18/2018 10:45 AM David Burgos, [address] [place] [address]     Signatures   Electronically signed by : Junito Villaseñor DO; Dec 15 2017 10:06PM EST                       (Author)

## 2018-01-23 NOTE — RESULT NOTES
Discussion/Summary   Hope Bellow- your lyme test was negative  NANCY Qureshi     Verified Results  (LC) Lyme Ab/Western Blot Reflex 50RHE1305 07:31AM Valeri Amaro     Test Name Result Flag Reference   Lyme IgG/IgM Ab <0 91 ISR  0 00-0 90   Negative         <0 91                                                 Equivocal  0 91 - 1 09                                                 Positive         >1 09   Lyme Disease Ab, Quant, IgM <0 80 index  0 00-0 79   Negative         <0 80                                                 Equivocal  0 80 - 1 19                                                 Positive         >1 19                  IgM levels may peak at 3-6 weeks post infection, then                  gradually decline

## 2018-01-23 NOTE — RESULT NOTES
Verified Results  (1) HEMOGLOBIN A1C 41WYI8055 07:31AM Evert Peña     Test Name Result Flag Reference   Hemoglobin A1c 5 7 % H 4 8-5 6   Pre-diabetes: 5 7 - 6 4           Diabetes: >6 4           Glycemic control for adults with diabetes: <7 0

## 2018-02-20 ENCOUNTER — OFFICE VISIT (OUTPATIENT)
Dept: PODIATRY | Facility: CLINIC | Age: 73
End: 2018-02-20
Payer: COMMERCIAL

## 2018-02-20 VITALS
WEIGHT: 174 LBS | DIASTOLIC BLOOD PRESSURE: 99 MMHG | BODY MASS INDEX: 32.02 KG/M2 | HEIGHT: 62 IN | SYSTOLIC BLOOD PRESSURE: 159 MMHG

## 2018-02-20 DIAGNOSIS — L60.0 INGROWING NAIL: Primary | ICD-10-CM

## 2018-02-20 DIAGNOSIS — B35.1 ONYCHOMYCOSIS: ICD-10-CM

## 2018-02-20 DIAGNOSIS — L03.031 CELLULITIS OF TOE OF RIGHT FOOT: ICD-10-CM

## 2018-02-20 PROCEDURE — 99203 OFFICE O/P NEW LOW 30 MIN: CPT | Performed by: PODIATRIST

## 2018-02-20 RX ORDER — ATENOLOL 25 MG/1
1 TABLET ORAL DAILY
COMMUNITY
Start: 2012-03-26 | End: 2018-04-13

## 2018-02-20 RX ORDER — LEVOTHYROXINE SODIUM 0.1 MG/1
1 TABLET ORAL DAILY
COMMUNITY
Start: 2013-08-06 | End: 2018-10-15 | Stop reason: SDUPTHER

## 2018-02-20 RX ORDER — LOSARTAN POTASSIUM AND HYDROCHLOROTHIAZIDE 12.5; 1 MG/1; MG/1
1 TABLET ORAL DAILY
COMMUNITY
Start: 2012-04-10 | End: 2018-09-24 | Stop reason: SDUPTHER

## 2018-02-20 RX ORDER — AMLODIPINE BESYLATE 5 MG/1
1 TABLET ORAL DAILY
COMMUNITY
Start: 2016-02-29 | End: 2018-04-13

## 2018-02-20 RX ORDER — ATORVASTATIN CALCIUM 20 MG/1
1 TABLET, FILM COATED ORAL DAILY
COMMUNITY
Start: 2012-10-17 | End: 2018-06-18 | Stop reason: SDUPTHER

## 2018-02-20 RX ORDER — SULFAMETHOXAZOLE AND TRIMETHOPRIM 800; 160 MG/1; MG/1
1 TABLET ORAL EVERY 12 HOURS SCHEDULED
Qty: 14 TABLET | Refills: 0 | Status: SHIPPED | OUTPATIENT
Start: 2018-02-20 | End: 2018-02-27

## 2018-02-20 NOTE — PROGRESS NOTES
Assessment/Plan:  Wedge resection of ingrown right hallux tibial border under ethyl chloride anesthesia  Applied silver nitrate and dry sterile dressing  Bactrim twice a day x7 days  Patient given handout on instructions including warm water and Epsom salt soaks and dressing daily  Discussed treatments for nail fungus is not interested in treating at this time  Follow-up 2 weeks  Patient call if any increasing signs of infection  Diagnoses and all orders for this visit:    Ingrowing nail    Cellulitis of toe of right foot  -     sulfamethoxazole-trimethoprim (BACTRIM DS) 800-160 mg per tablet; Take 1 tablet by mouth every 12 (twelve) hours for 7 days    Onychomycosis    Other orders  -     amLODIPine (NORVASC) 5 mg tablet; Take 1 tablet by mouth daily  -     atenolol (TENORMIN) 25 mg tablet; Take 1 tablet by mouth daily  -     atorvastatin (LIPITOR) 20 mg tablet; Take 1 tablet by mouth daily  -     levothyroxine 100 mcg tablet; Take 1 tablet by mouth daily  -     losartan-hydrochlorothiazide (HYZAAR) 100-12 5 MG per tablet; Take 1 tablet by mouth daily        Subjective:      Patient ID: Madison Saab is a 71 y o  female  Patient is ingrown nail right medial border for the past week  Patient has had ingrown once or twice before  The patient has had redness and pus  Patient also has significant nail fungus for over 20 years  Tucked Sporanox many years ago but never resolved  The following portions of the patient's history were reviewed and updated as appropriate: allergies, current medications, past family history, past medical history, past surgical history and problem list     Review of Systems   Constitutional: Negative  HENT: Negative  Eyes: Negative  Respiratory: Negative  Cardiovascular: Negative  Gastrointestinal: Negative  Endocrine: Negative  Genitourinary: Negative  Musculoskeletal: Negative  Skin: Negative  Allergic/Immunologic: Negative  Neurological: Negative  Hematological: Negative  Psychiatric/Behavioral: Negative  Objective:     Physical ExamFoot Exam    DP pulses 2/4 bilateral, PT pulses 1/4 bilateral, capillary fill time 3 seconds times 10 temperature grade within normal limits  All nails thick discolored dystrophic positive subungual debris  Right hallux medial border positive ingrown positive erythema to IPJ negative purulence negative abscess significant pes planus

## 2018-03-06 ENCOUNTER — OFFICE VISIT (OUTPATIENT)
Dept: PODIATRY | Facility: CLINIC | Age: 73
End: 2018-03-06
Payer: COMMERCIAL

## 2018-03-06 VITALS
HEIGHT: 60 IN | SYSTOLIC BLOOD PRESSURE: 132 MMHG | DIASTOLIC BLOOD PRESSURE: 73 MMHG | WEIGHT: 174 LBS | BODY MASS INDEX: 34.16 KG/M2

## 2018-03-06 DIAGNOSIS — L03.031 CELLULITIS OF TOE OF RIGHT FOOT: ICD-10-CM

## 2018-03-06 DIAGNOSIS — L60.0 INGROWING NAIL: Primary | ICD-10-CM

## 2018-03-06 DIAGNOSIS — B35.1 ONYCHOMYCOSIS: ICD-10-CM

## 2018-03-06 PROCEDURE — 99213 OFFICE O/P EST LOW 20 MIN: CPT | Performed by: PODIATRIST

## 2018-03-06 NOTE — PROGRESS NOTES
Assessment/Plan:  Patient to continue with Neosporin dressing x1 week  Discussed proper way to cut nail  Discussed possible risk of recurrence  Patient to call if any signs of infection  Follow-up 1 month  Diagnoses and all orders for this visit:    Ingrowing nail    Cellulitis of toe of right foot  -     sulfamethoxazole-trimethoprim (BACTRIM DS) 800-160 mg per tablet; Take 1 tablet by mouth every 12 (twelve) hours for 7 days    Onychomycosis    Other orders  -     amLODIPine (NORVASC) 5 mg tablet; Take 1 tablet by mouth daily  -     atenolol (TENORMIN) 25 mg tablet; Take 1 tablet by mouth daily  -     atorvastatin (LIPITOR) 20 mg tablet; Take 1 tablet by mouth daily  -     levothyroxine 100 mcg tablet; Take 1 tablet by mouth daily  -     losartan-hydrochlorothiazide (HYZAAR) 100-12 5 MG per tablet; Take 1 tablet by mouth daily        Subjective:      Patient ID: Ning Tolbert is a 71 y o  female  Patient is follow-up for ingrown nail right hallux  Patient took antibiotics  Patient has been applying Neosporin dressing daily  Redness and drainage have resolved  Patient says she has only had ingrown once or twice before  I think she cut the nail rongeur  Does have thickened nail mild nail fungus  The following portions of the patient's history were reviewed and updated as appropriate: allergies, current medications, past family history, past medical history, past surgical history and problem list     Review of Systems   Constitutional: Negative  HENT: Negative  Eyes: Negative  Respiratory: Negative  Cardiovascular: Negative  Gastrointestinal: Negative  Endocrine: Negative  Genitourinary: Negative  Musculoskeletal: Negative  Skin: Negative  Allergic/Immunologic: Negative  Neurological: Negative  Hematological: Negative  Psychiatric/Behavioral: Negative            Objective:     Physical Exam   Cardiovascular:   Pulses:       Dorsalis pedis pulses are 1+ on the left side  Posterior tibial pulses are 1+ on the right side, and 1+ on the left side  Foot Exam    General  General Appearance: appears stated age and healthy   Orientation: alert and oriented to person, place, and time   Affect: appropriate   Gait: unimpaired       Right Foot/Ankle     Inspection and Palpation  Hammertoes: second toe, third toe, fourth toe and fifth toe  Hallux valgus: no  Hallux limitus: yes    Neurovascular  Posterior tibial: 1+      Left Foot/Ankle      Inspection and Palpation  Hammertoes: second toe, third toe, fourth toe and fifth toe  Hallux valgus: no  Hallux limitus: yes    Neurovascular  Dorsalis pedis: 1+  Posterior tibial: 1+            DP pulses 2/4 bilateral, PT pulses 1/4 bilateral, capillary fill time 3 seconds times 10 temperature grade within normal limits  All nails thick discolored dystrophic positive subungual debris  Right hallux medial border resolving ingrown negative exudate negative purulence negative signs of infection  There is some thickening nails  Mild xerosis bilateral   Deep abscess

## 2018-03-15 DIAGNOSIS — I10 BENIGN ESSENTIAL HYPERTENSION: Primary | ICD-10-CM

## 2018-03-15 RX ORDER — ATENOLOL 25 MG/1
TABLET ORAL
Qty: 180 TABLET | Refills: 3 | Status: SHIPPED | OUTPATIENT
Start: 2018-03-15 | End: 2018-04-13 | Stop reason: SDUPTHER

## 2018-04-13 ENCOUNTER — OFFICE VISIT (OUTPATIENT)
Dept: FAMILY MEDICINE CLINIC | Facility: CLINIC | Age: 73
End: 2018-04-13
Payer: COMMERCIAL

## 2018-04-13 VITALS
DIASTOLIC BLOOD PRESSURE: 74 MMHG | BODY MASS INDEX: 33.77 KG/M2 | RESPIRATION RATE: 16 BRPM | HEIGHT: 60 IN | SYSTOLIC BLOOD PRESSURE: 134 MMHG | TEMPERATURE: 98.5 F | HEART RATE: 64 BPM | WEIGHT: 172 LBS

## 2018-04-13 DIAGNOSIS — E78.5 HYPERLIPIDEMIA LDL GOAL <130: ICD-10-CM

## 2018-04-13 DIAGNOSIS — E03.9 HYPOTHYROIDISM, UNSPECIFIED TYPE: ICD-10-CM

## 2018-04-13 DIAGNOSIS — I10 BENIGN ESSENTIAL HYPERTENSION: Primary | ICD-10-CM

## 2018-04-13 DIAGNOSIS — M79.10 MYALGIA: ICD-10-CM

## 2018-04-13 PROCEDURE — 3075F SYST BP GE 130 - 139MM HG: CPT | Performed by: FAMILY MEDICINE

## 2018-04-13 PROCEDURE — 1101F PT FALLS ASSESS-DOCD LE1/YR: CPT | Performed by: FAMILY MEDICINE

## 2018-04-13 PROCEDURE — 99214 OFFICE O/P EST MOD 30 MIN: CPT | Performed by: FAMILY MEDICINE

## 2018-04-13 PROCEDURE — 3078F DIAST BP <80 MM HG: CPT | Performed by: FAMILY MEDICINE

## 2018-04-13 RX ORDER — ATENOLOL 25 MG/1
25 TABLET ORAL 2 TIMES DAILY
Qty: 180 TABLET | Refills: 3 | Status: SHIPPED | OUTPATIENT
Start: 2018-04-13 | End: 2019-05-20

## 2018-04-13 NOTE — PATIENT INSTRUCTIONS
We can start by discontinuing the amlodipine and increasing your atenolol from 25mg 1x/d to 2x/d  If not helpful after 1-2 weeks, you could consider stopping the atorvastatin (lipitor) for 2 weeks just to see  In the future if needed for blood pressure control, the losartan/hctz dose could be adjusted

## 2018-04-13 NOTE — PROGRESS NOTES
Assessment/Plan:    No problem-specific Assessment & Plan notes found for this encounter  Dc amlodipine as she feels it may be causing leg symptoms  Consider neurology eval for right leg paresthesia if no better  F/u for bp check if stays on new bp regimen  Consider 2w hold of statin if no better       Diagnoses and all orders for this visit:    Benign essential hypertension  -     atenolol (TENORMIN) 25 mg tablet; Take 1 tablet (25 mg total) by mouth 2 (two) times a day    Hyperlipidemia LDL goal <130    Myalgia  -     Lyme Antibody Profile with reflex to WB; Future  -     CK; Future    Hypothyroidism, unspecified type          myaglia new r/o norvasc effect  Chol/thyroid stable  RLS? htn stable      No Follow-up on file  Subjective:      Patient ID: Nathaniel Merchant is a 71 y o  female  Chief Complaint   Patient presents with    leg discomfort     both legs, right leg can not put pressure on when sleeping       Leg Pain     since starting amlodipine        HPI  B/l knees down feel tight  Burning  Paige long standing  Even when elevated sometimes  4 months  Some ankle swelling at times but mild  Right leg pain, lower, paige at night  Some general muscle aches  Tick bite in past, lyme neg in past 6w after  Paige after amlodipine started she thinks though she does not notice any significant edema        The following portions of the patient's history were reviewed and updated as appropriate: allergies, current medications, past family history, past medical history, past social history, past surgical history and problem list     Review of Systems      Current Outpatient Prescriptions   Medication Sig Dispense Refill    atorvastatin (LIPITOR) 20 mg tablet Take 1 tablet by mouth daily      levothyroxine 100 mcg tablet Take 1 tablet by mouth daily      losartan-hydrochlorothiazide (HYZAAR) 100-12 5 MG per tablet Take 1 tablet by mouth daily      atenolol (TENORMIN) 25 mg tablet Take 1 tablet (25 mg total) by mouth 2 (two) times a day 180 tablet 3     No current facility-administered medications for this visit  Objective:    /74   Pulse 64   Temp 98 5 °F (36 9 °C)   Resp 16   Ht 5' (1 524 m)   Wt 78 kg (172 lb)   BMI 33 59 kg/m²        Physical Exam   Constitutional: She appears well-developed  HENT:   Head: Normocephalic  Eyes: Conjunctivae are normal    Neck: Neck supple  Cardiovascular: Normal rate and intact distal pulses  Pulmonary/Chest: Effort normal  No respiratory distress  Abdominal: Soft  Musculoskeletal: She exhibits no edema or deformity  Neurological: She is alert  She has normal reflexes  She displays normal reflexes  She exhibits normal muscle tone  Coordination normal    Skin: Skin is warm and dry  Psychiatric: Her behavior is normal  Thought content normal    Nursing note and vitals reviewed               Murray King DO

## 2018-04-18 LAB
B BURGDOR IGG+IGM SER-ACNC: <0.91 ISR (ref 0–0.9)
B BURGDOR IGM SER IA-ACNC: <0.8 INDEX (ref 0–0.79)
CK SERPL-CCNC: 95 U/L (ref 24–173)

## 2018-06-05 LAB
ALBUMIN SERPL-MCNC: 4.3 G/DL (ref 3.5–4.8)
ALBUMIN/GLOB SERPL: 1.4 {RATIO} (ref 1.2–2.2)
ALP SERPL-CCNC: 77 IU/L (ref 39–117)
ALT SERPL-CCNC: 16 IU/L (ref 0–32)
AST SERPL-CCNC: 20 IU/L (ref 0–40)
BILIRUB SERPL-MCNC: 0.4 MG/DL (ref 0–1.2)
BUN SERPL-MCNC: 13 MG/DL (ref 8–27)
BUN/CREAT SERPL: 15 (ref 12–28)
CALCIUM SERPL-MCNC: 9.6 MG/DL (ref 8.7–10.3)
CHLORIDE SERPL-SCNC: 98 MMOL/L (ref 96–106)
CHOLEST SERPL-MCNC: 149 MG/DL (ref 100–199)
CO2 SERPL-SCNC: 26 MMOL/L (ref 18–29)
CREAT SERPL-MCNC: 0.85 MG/DL (ref 0.57–1)
GLOBULIN SER-MCNC: 3.1 G/DL (ref 1.5–4.5)
GLUCOSE SERPL-MCNC: 104 MG/DL (ref 65–99)
HDLC SERPL-MCNC: 45 MG/DL
LDLC SERPL CALC-MCNC: 82 MG/DL (ref 0–99)
MICRODELETION SYND BLD/T FISH: NORMAL
POTASSIUM SERPL-SCNC: 4.9 MMOL/L (ref 3.5–5.2)
PROT SERPL-MCNC: 7.4 G/DL (ref 6–8.5)
SL AMB EGFR AFRICAN AMERICAN: 80 ML/MIN/1.73
SL AMB EGFR NON AFRICAN AMERICAN: 69 ML/MIN/1.73
SODIUM SERPL-SCNC: 137 MMOL/L (ref 134–144)
TRIGL SERPL-MCNC: 108 MG/DL (ref 0–149)
TSH SERPL DL<=0.005 MIU/L-ACNC: 1.23 UIU/ML (ref 0.45–4.5)

## 2018-06-18 ENCOUNTER — OFFICE VISIT (OUTPATIENT)
Dept: FAMILY MEDICINE CLINIC | Facility: CLINIC | Age: 73
End: 2018-06-18
Payer: COMMERCIAL

## 2018-06-18 VITALS
DIASTOLIC BLOOD PRESSURE: 84 MMHG | SYSTOLIC BLOOD PRESSURE: 130 MMHG | BODY MASS INDEX: 33.38 KG/M2 | HEART RATE: 62 BPM | TEMPERATURE: 98.2 F | RESPIRATION RATE: 16 BRPM | WEIGHT: 170 LBS | HEIGHT: 60 IN

## 2018-06-18 DIAGNOSIS — Z12.39 BREAST CANCER SCREENING: ICD-10-CM

## 2018-06-18 DIAGNOSIS — E03.9 HYPOTHYROIDISM, UNSPECIFIED TYPE: ICD-10-CM

## 2018-06-18 DIAGNOSIS — I10 BENIGN ESSENTIAL HYPERTENSION: Primary | ICD-10-CM

## 2018-06-18 DIAGNOSIS — R73.01 IFG (IMPAIRED FASTING GLUCOSE): ICD-10-CM

## 2018-06-18 DIAGNOSIS — Z11.59 NEED FOR HEPATITIS C SCREENING TEST: ICD-10-CM

## 2018-06-18 DIAGNOSIS — E78.5 HYPERLIPIDEMIA LDL GOAL <130: ICD-10-CM

## 2018-06-18 DIAGNOSIS — Z78.0 MENOPAUSE: ICD-10-CM

## 2018-06-18 PROCEDURE — 99214 OFFICE O/P EST MOD 30 MIN: CPT | Performed by: FAMILY MEDICINE

## 2018-06-18 RX ORDER — AMLODIPINE BESYLATE 5 MG/1
5 TABLET ORAL DAILY
Qty: 90 TABLET | Refills: 1 | Status: SHIPPED | OUTPATIENT
Start: 2018-06-18 | End: 2019-01-08 | Stop reason: SDUPTHER

## 2018-06-18 RX ORDER — ATORVASTATIN CALCIUM 20 MG/1
20 TABLET, FILM COATED ORAL DAILY
Qty: 90 TABLET | Refills: 1 | Status: SHIPPED | OUTPATIENT
Start: 2018-06-18 | End: 2018-12-13 | Stop reason: SDUPTHER

## 2018-06-18 NOTE — PROGRESS NOTES
Assessment/Plan:    No problem-specific Assessment & Plan notes found for this encounter  Htn/chol/thyroid stable  q6m f/u     Diagnoses and all orders for this visit:    Benign essential hypertension    Hypothyroidism, unspecified type    Hyperlipidemia LDL goal <130              No Follow-up on file  Subjective:      Patient ID: Donita Cameron is a 71 y o  female  Chief Complaint   Patient presents with    Follow-up     6 month follow up drhlpn       HPI  Tolerating meds  Working on diet  No cp or sob  No bowel changes  Thyroid meds ons schedule  Labs reviewed with pt  The following portions of the patient's history were reviewed and updated as appropriate: allergies, current medications, past family history, past medical history, past social history, past surgical history and problem list     Review of Systems   Respiratory: Negative for chest tightness and shortness of breath  Neurological: Negative for dizziness  Current Outpatient Prescriptions   Medication Sig Dispense Refill    atenolol (TENORMIN) 25 mg tablet Take 1 tablet (25 mg total) by mouth 2 (two) times a day 180 tablet 3    atorvastatin (LIPITOR) 20 mg tablet Take 1 tablet by mouth daily      levothyroxine 100 mcg tablet Take 1 tablet by mouth daily      losartan-hydrochlorothiazide (HYZAAR) 100-12 5 MG per tablet Take 1 tablet by mouth daily       No current facility-administered medications for this visit  Objective:    /80   Pulse 62   Temp 98 2 °F (36 8 °C)   Resp 16   Ht 5' (1 524 m)   Wt 77 1 kg (170 lb)   BMI 33 20 kg/m²        Physical Exam   Constitutional: She appears well-developed  HENT:   Head: Normocephalic  Eyes: Conjunctivae are normal    Neck: Neck supple  Cardiovascular: Normal rate and intact distal pulses  Pulmonary/Chest: Effort normal  No respiratory distress  Abdominal: Soft  Musculoskeletal: She exhibits no edema or deformity  Neurological: She is alert     Skin: Skin is warm and dry  Psychiatric: Her behavior is normal  Thought content normal    Nursing note and vitals reviewed               Perilta Mora DO

## 2018-09-20 ENCOUNTER — CLINICAL SUPPORT (OUTPATIENT)
Dept: FAMILY MEDICINE CLINIC | Facility: CLINIC | Age: 73
End: 2018-09-20
Payer: COMMERCIAL

## 2018-09-20 DIAGNOSIS — Z23 NEED FOR INFLUENZA VACCINATION: Primary | ICD-10-CM

## 2018-09-20 PROCEDURE — 90662 IIV NO PRSV INCREASED AG IM: CPT

## 2018-09-20 PROCEDURE — G0008 ADMIN INFLUENZA VIRUS VAC: HCPCS

## 2018-09-24 DIAGNOSIS — I10 BENIGN ESSENTIAL HYPERTENSION: Primary | ICD-10-CM

## 2018-09-24 RX ORDER — LOSARTAN POTASSIUM AND HYDROCHLOROTHIAZIDE 12.5; 1 MG/1; MG/1
TABLET ORAL
Qty: 90 TABLET | Refills: 1 | Status: SHIPPED | OUTPATIENT
Start: 2018-09-24 | End: 2019-03-12 | Stop reason: SDUPTHER

## 2018-09-28 ENCOUNTER — OFFICE VISIT (OUTPATIENT)
Dept: OBGYN CLINIC | Facility: CLINIC | Age: 73
End: 2018-09-28
Payer: COMMERCIAL

## 2018-09-28 ENCOUNTER — APPOINTMENT (OUTPATIENT)
Dept: RADIOLOGY | Facility: CLINIC | Age: 73
End: 2018-09-28
Payer: COMMERCIAL

## 2018-09-28 VITALS
HEART RATE: 66 BPM | DIASTOLIC BLOOD PRESSURE: 84 MMHG | HEIGHT: 60 IN | WEIGHT: 169.8 LBS | BODY MASS INDEX: 33.34 KG/M2 | SYSTOLIC BLOOD PRESSURE: 167 MMHG

## 2018-09-28 DIAGNOSIS — M25.512 LEFT SHOULDER PAIN, UNSPECIFIED CHRONICITY: ICD-10-CM

## 2018-09-28 DIAGNOSIS — M75.82 ROTATOR CUFF TENDONITIS, LEFT: ICD-10-CM

## 2018-09-28 DIAGNOSIS — M25.512 LEFT SHOULDER PAIN, UNSPECIFIED CHRONICITY: Primary | ICD-10-CM

## 2018-09-28 PROCEDURE — 73030 X-RAY EXAM OF SHOULDER: CPT

## 2018-09-28 PROCEDURE — 99213 OFFICE O/P EST LOW 20 MIN: CPT | Performed by: ORTHOPAEDIC SURGERY

## 2018-09-28 NOTE — PROGRESS NOTES
Assessment/Plan:  1  Left shoulder pain, unspecified chronicity  XR shoulder 2+ vw left     The patient appears to have some bursitis and rotator cuff tendinitis, though we cannot definitively rule out tear  We will start with conservative treatment for this  She was given a script for physical therapy today  She can also ice and take over-the-counter medications as needed  We will see her back in 6 weeks for repeat evaluation  If she has not made any progress with will consider an MRI at that point  Subjective:   Roman Goetz is a 71 y o  female who presents today for evaluation of her left shoulder  She states this gradually started to bother her a couple months and seems to be getting progressively worse  She denies any injury prior to the onset of her symptoms  She notes her main pain to be mostly about the anterior lateral aspects of the shoulder which can radiate down the arm some at times  This is worse with overhead movements and trying to sleep at night  Rest and Advil helps some  She notes good range of motion of shoulder but does complain of some weakness  She denies any mechanical symptoms  She also denies any paresthesias of the upper extremity  Review of Systems   Constitutional: Negative for chills, fever and unexpected weight change  HENT: Negative for hearing loss, nosebleeds and sore throat  Eyes: Negative for pain, redness and visual disturbance  Respiratory: Negative for cough, shortness of breath and wheezing  Cardiovascular: Negative for chest pain, palpitations and leg swelling  Gastrointestinal: Negative for abdominal pain, nausea and vomiting  Endocrine: Negative for polydipsia and polyuria  Genitourinary: Negative for dysuria and hematuria  Musculoskeletal:        See HPI   Skin: Negative for rash and wound  Neurological: Negative for dizziness, numbness and headaches     Psychiatric/Behavioral: Negative for decreased concentration and suicidal ideas  The patient is not nervous/anxious  Past Medical History:   Diagnosis Date    Arthropathy 10/15/2010    Disease of thyroid gland     Hyperlipidemia     Hypertension     Onychomycosis 03/04/2005       Past Surgical History:   Procedure Laterality Date    EYE SURGERY      bilateral IOL    MD OPEN TX RADIAL HEAD/NECK FRACTURE PROSTHETIC Right 12/5/2016    Procedure: OPEN REDUCTION W/ INTERNAL FIXATION (ORIF) ELBOW;  Surgeon: Shonda Flores MD;  Location: [facility];  Service: Orthopedics       Family History   Problem Relation Age of Onset    Diabetes Sister        Social History     Occupational History    Not on file  Social History Main Topics    Smoking status: Never Smoker    Smokeless tobacco: Never Used    Alcohol use No    Drug use: No    Sexual activity: Not on file         Current Outpatient Prescriptions:     amLODIPine (NORVASC) 5 mg tablet, Take 1 tablet (5 mg total) by mouth daily, Disp: 90 tablet, Rfl: 1    atenolol (TENORMIN) 25 mg tablet, Take 1 tablet (25 mg total) by mouth 2 (two) times a day, Disp: 180 tablet, Rfl: 3    atorvastatin (LIPITOR) 20 mg tablet, Take 1 tablet (20 mg total) by mouth daily, Disp: 90 tablet, Rfl: 1    levothyroxine 100 mcg tablet, Take 1 tablet by mouth daily, Disp: , Rfl:     losartan-hydrochlorothiazide (HYZAAR) 100-12 5 MG per tablet, take 1 tablet by mouth once daily, Disp: 90 tablet, Rfl: 1    Allergies   Allergen Reactions    Amoxicillin Itching and Other (See Comments)     Other reaction(s): tingling    Penicillins Rash       Objective:  Vitals:    09/28/18 0934   BP: 167/84   Pulse: 66       Left Shoulder Exam     Tenderness   The patient is experiencing tenderness in the biceps tendon      Range of Motion   Active Abduction: normal   Forward Flexion: normal   External Rotation: normal   Internal Rotation 0 degrees: L1     Muscle Strength   Abduction: 4/5   Internal Rotation: 5/5   External Rotation: 5/5   Biceps: 4/5 Tests   Drop Arm: negative  Hawkin's test: positive  Impingement: positive    Other   Erythema: absent  Sensation: normal  Pulse: present     Comments:  Positive speed's test   Positive empty can test             Physical Exam   Constitutional: She is oriented to person, place, and time  She appears well-developed and well-nourished  No distress  HENT:   Head: Normocephalic and atraumatic  Eyes: Conjunctivae and EOM are normal  No scleral icterus  Neck: No JVD present  Cardiovascular: Normal rate and intact distal pulses  Pulmonary/Chest: Effort normal  No respiratory distress  Abdominal: She exhibits no distension  Neurological: She is alert and oriented to person, place, and time  Coordination normal    Skin: Skin is warm  Psychiatric: She has a normal mood and affect         I have personally reviewed pertinent films in PACS and my interpretation is as follows:  X-rays left shoulder:  Mild glenohumeral joint space narrowing, otherwise normal

## 2018-10-08 ENCOUNTER — EVALUATION (OUTPATIENT)
Dept: PHYSICAL THERAPY | Facility: CLINIC | Age: 73
End: 2018-10-08
Payer: COMMERCIAL

## 2018-10-08 DIAGNOSIS — M25.512 LEFT SHOULDER PAIN, UNSPECIFIED CHRONICITY: Primary | ICD-10-CM

## 2018-10-08 PROCEDURE — G8984 CARRY CURRENT STATUS: HCPCS

## 2018-10-08 PROCEDURE — G8985 CARRY GOAL STATUS: HCPCS

## 2018-10-08 PROCEDURE — 97161 PT EVAL LOW COMPLEX 20 MIN: CPT

## 2018-10-08 NOTE — LETTER
2018    Candelaria Peres [address]    Patient: Kanu Chan   YOB: 1945   Date of Visit: 10/8/2018     Encounter Diagnosis     ICD-10-CM    1  Left shoulder pain, unspecified chronicity M25 512 Ambulatory referral to Physical Therapy     PT plan of care cert/re-cert       Dear Dr Myles Forrest:    Please review the attached Plan of Care from [facility] recent visit  Please verify that you agree therapy should continue by signing the attached document and sending it back to our office  If you have any questions or concerns, please don't hesitate to call  Sincerely,    Logan Kerr, PT      Referring Provider:      I certify that I have read the below Plan of Care and certify the need for these services furnished under this plan of treatment while under my care  Taj Figueroa PA-C  [address]          PT Evaluation     Today's date: 10/8/2018  Patient name: Kanu Chan  : 1945  MRN: [id number]  Referring provider: Jt Vogt  Dx:   Encounter Diagnosis     ICD-10-CM    1  Left shoulder pain, unspecified chronicity M25 512 Ambulatory referral to Physical Therapy                  Assessment  Impairments: abnormal muscle firing, abnormal or restricted ROM, activity intolerance, impaired physical strength, pain with function and scapular dyskinesis    Assessment details: Pt is a pleasant 71 y o  female who presents to [address] with L shoulder pain of insidious onset  Today, she presents with good shoulder ROM, but decreased scapular mobility, pain through  degrees of abduction/scaption, decreased L shoulder MMT testing, postural awareness deficits and high self reports of pain w/ activity   Functionally, she is limited in her ability to perform overhead lifting, sleep through the night, dress and groom, and perform normal household activities  She is motivated to improve  Pt will benefit from skilled PT to address the aforementioned deficits and limitations in an effort to maximize pain free functional mobility and overall quality of life  Progress as able with these goals in mind  Understanding of Dx/Px/POC: good   Prognosis: good    Goals  Short term goals (3 weeks):  1) Pt will improve L shoulder ROM to equal that of R w/o pain  2) Pt will improve L shoulder MMT testing by 1/3 grade pain free  3) Pt will report pain at worse <5/10  4) Pt will initiate and progress HEP w/ special emphasis on functional L shoulder ROM and stability  Long term goals (6 weeks)  1) Pt will improve FOTO to at least 70   2) Pt will sleep through the night in positioning of choosing 6/7 nights a week w/o waking due to pain  3) Pt will perform full day of household and yard activities to include all overhead lifting w/ improved postural awareness and lifting mechanics, pain <3/10 throughout     4) Pt will be independent and compliant w/ HEP in order to maximize functional benefit of skilled PT following d/c        Plan  Patient would benefit from: skilled PT  Planned modality interventions: cryotherapy and thermotherapy: hydrocollator packs  Planned therapy interventions: abdominal trunk stabilization, therapeutic activities, therapeutic exercise, therapeutic training, graded motor, graded exercise, graded activity, patient education, postural training, neuromuscular re-education, manual therapy, joint mobilization, activity modification, ADL retraining, body mechanics training, home exercise program, stretching and strengthening  Frequency: 2x week  Duration in visits: 12  Duration in weeks: 6  Treatment plan discussed with: patient  Plan details: POC to include: MWM, DTM and TPR, shoulder stab and functional lifting     HEP to start: see handout          Subjective Evaluation    History of Present Illness  Mechanism of injury: Pt has had L shoulder pain since the end of 2018  Pt does lots of housework, helps care for  who has COPD  Does lots of yard work, lifting, and house work  Was doing lots of lifting during the summer months, noted that pain gradually increased  No specific ISAAK  Pt notes that sleeping is now difficult s/t pain, has to take Advil or she can't sleep  Pt also has R leg pain as well currently, not sure what that is about  R leg pain causes her to have to lie on stomach while sleeping  R leg pain is mostly in R lateral lower leg  Pain in leg is a "burning stab " Pt has hx of L sided neck pain  Pain can be down into L thumb as well, notes arthritis through hands  Pt is hopeful that PT will help to calm sx in L shoulder  Functional status to follow:  Quality of life: good    Pain  No pain reported  Current pain ratin  At best pain ratin  At worst pain ratin  Location: L anterior shoulder, down into L biceps   Quality: radiating and burning  Relieving factors: medications and rest  Aggravating factors: overhead activity and lifting  Progression: worsening    Social Support  Steps to enter house: no  Stairs in house: no   Lives in: multiple-level home  Lives with: spouse    Employment status: not working (retired )  Hand dominance: right    Treatments  Previous treatment: physical therapy (for R elbow fracture)  Patient Goals  Patient goals for therapy: increased strength, decreased pain and increased motion  Patient goal: be able to decrease pain so I can do what I need to do           Objective     Postural Observations  Seated posture: fair  Standing posture: fair  Correction of posture: makes symptoms better        Palpation     Additional Palpation Details  TTP through L anterior shoulder along LHOB, mod increase in tissue density through L posterior upper quarter     Cervical/Thoracic Screen   Cervical range of motion within normal limits    Neurological Testing     Sensation Shoulder   Left Shoulder   Intact: light touch    Right Shoulder   Intact: light touch    Active Range of Motion   Left Shoulder   Normal active range of motion    Right Shoulder   Normal active range of motion    Passive Range of Motion   Left Shoulder   Normal passive range of motion    Right Shoulder   Normal passive range of motion    Scapular Mobility   Left Shoulder   Scapular mobility: fair  Scapular Mobility with Shoulder to 90° FF   Scapular winging: minimal  Scapular elevation: minimal  Upward rotation: delayed    Strength/Myotome Testing     Left Shoulder     Planes of Motion   Flexion: 4-   Extension: 4   Abduction: 4-   Adduction: 4   External rotation at 0°:  4   Internal rotation at 0°:  4     Right Shoulder     Planes of Motion   Flexion: 4+   Extension: 4+   Abduction: 4+   Adduction: 4+   External rotation at 0°:  4+   Internal rotation at 0°:  4+     Additional Strength Details  Elbow flex and ext 4+/5 B w/o pain    MT and LT grossly 4-/5 w/o pain     Tests     Additional Tests Details  Impingement testing not specifically performed but likely (+) due to pt presentation (did not want to exacerbate sx)    MWM for L shoulder scaption immediately improves pain free overhead ROM x 5      Flowsheet Rows      Most Recent Value   PT/OT G-Codes   Current Score  50   Projected Score  65   FOTO information reviewed  Yes   Assessment Type  Evaluation   G code set  Carrying, Moving & Handling Objects   Carrying, Moving and Handling Objects Current Status ()  CK   Carrying, Moving and Handling Objects Goal Status ()  CJ          Precautions: standard, hx of L sided neck pain     Specialty Daily Treatment Diary     Manual         MWM for L shoulder abduction        DTM and TPR to L side upper quarter                                    Exercise Diary         Alt isos        D2 PNF        Wall slides        Rows and ext        Walk outs        IR/ER        Prone rows        Prone ext        YTB overhead press         Wall posture                                                                                             Modalities        Heat pre        Ice post

## 2018-10-08 NOTE — PROGRESS NOTES
PT Evaluation     Today's date: 10/8/2018  Patient name: Sabine Owusu  : 1945  MRN: [id number]  Referring provider: Katlyn Amanda  Dx:   Encounter Diagnosis     ICD-10-CM    1  Left shoulder pain, unspecified chronicity M25 512 Ambulatory referral to Physical Therapy                  Assessment  Impairments: abnormal muscle firing, abnormal or restricted ROM, activity intolerance, impaired physical strength, pain with function and scapular dyskinesis    Assessment details: Pt is a pleasant 71 y o  female who presents to [address] with L shoulder pain of insidious onset  Today, she presents with good shoulder ROM, but decreased scapular mobility, pain through  degrees of abduction/scaption, decreased L shoulder MMT testing, postural awareness deficits and high self reports of pain w/ activity  Functionally, she is limited in her ability to perform overhead lifting, sleep through the night, dress and groom, and perform normal household activities  She is motivated to improve  Pt will benefit from skilled PT to address the aforementioned deficits and limitations in an effort to maximize pain free functional mobility and overall quality of life  Progress as able with these goals in mind  Understanding of Dx/Px/POC: good   Prognosis: good    Goals  Short term goals (3 weeks):  1) Pt will improve L shoulder ROM to equal that of R w/o pain  2) Pt will improve L shoulder MMT testing by 1/3 grade pain free  3) Pt will report pain at worse <5/10  4) Pt will initiate and progress HEP w/ special emphasis on functional L shoulder ROM and stability  Long term goals (6 weeks)  1) Pt will improve FOTO to at least 70   2) Pt will sleep through the night in positioning of choosing 6/7 nights a week w/o waking due to pain  3) Pt will perform full day of household and yard activities to include all overhead lifting w/ improved postural awareness and lifting mechanics, pain <3/10 throughout  4) Pt will be independent and compliant w/ HEP in order to maximize functional benefit of skilled PT following d/c        Plan  Patient would benefit from: skilled PT  Planned modality interventions: cryotherapy and thermotherapy: hydrocollator packs  Planned therapy interventions: abdominal trunk stabilization, therapeutic activities, therapeutic exercise, therapeutic training, graded motor, graded exercise, graded activity, patient education, postural training, neuromuscular re-education, manual therapy, joint mobilization, activity modification, ADL retraining, body mechanics training, home exercise program, stretching and strengthening  Frequency: 2x week  Duration in visits: 12  Duration in weeks: 6  Treatment plan discussed with: patient  Plan details: POC to include: MWM, DTM and TPR, shoulder stab and functional lifting     HEP to start: see handout          Subjective Evaluation    History of Present Illness  Mechanism of injury: Pt has had L shoulder pain since the end of 2018  Pt does lots of housework, helps care for  who has COPD  Does lots of yard work, lifting, and house work  Was doing lots of lifting during the summer months, noted that pain gradually increased  No specific ISAAK  Pt notes that sleeping is now difficult s/t pain, has to take Advil or she can't sleep  Pt also has R leg pain as well currently, not sure what that is about  R leg pain causes her to have to lie on stomach while sleeping  R leg pain is mostly in R lateral lower leg  Pain in leg is a "burning stab " Pt has hx of L sided neck pain  Pain can be down into L thumb as well, notes arthritis through hands  Pt is hopeful that PT will help to calm sx in L shoulder   Functional status to follow:  Quality of life: good    Pain  No pain reported  Current pain ratin  At best pain ratin  At worst pain ratin  Location: L anterior shoulder, down into L biceps   Quality: radiating and burning  Relieving factors: medications and rest  Aggravating factors: overhead activity and lifting  Progression: worsening    Social Support  Steps to enter house: no  Stairs in house: no   Lives in: multiple-level home  Lives with: spouse    Employment status: not working (retired )  Hand dominance: right    Treatments  Previous treatment: physical therapy (for R elbow fracture)  Patient Goals  Patient goals for therapy: increased strength, decreased pain and increased motion  Patient goal: be able to decrease pain so I can do what I need to do           Objective     Postural Observations  Seated posture: fair  Standing posture: fair  Correction of posture: makes symptoms better        Palpation     Additional Palpation Details  TTP through L anterior shoulder along LHOB, mod increase in tissue density through L posterior upper quarter     Cervical/Thoracic Screen   Cervical range of motion within normal limits    Neurological Testing     Sensation     Shoulder   Left Shoulder   Intact: light touch    Right Shoulder   Intact: light touch    Active Range of Motion   Left Shoulder   Normal active range of motion    Right Shoulder   Normal active range of motion    Passive Range of Motion   Left Shoulder   Normal passive range of motion    Right Shoulder   Normal passive range of motion    Scapular Mobility   Left Shoulder   Scapular mobility: fair  Scapular Mobility with Shoulder to 90° FF   Scapular winging: minimal  Scapular elevation: minimal  Upward rotation: delayed    Strength/Myotome Testing     Left Shoulder     Planes of Motion   Flexion: 4-   Extension: 4   Abduction: 4-   Adduction: 4   External rotation at 0°: 4   Internal rotation at 0°: 4     Right Shoulder     Planes of Motion   Flexion: 4+   Extension: 4+   Abduction: 4+   Adduction: 4+   External rotation at 0°: 4+   Internal rotation at 0°: 4+     Additional Strength Details  Elbow flex and ext 4+/5 B w/o pain    MT and LT grossly 4-/5 w/o pain     Tests Additional Tests Details  Impingement testing not specifically performed but likely (+) due to pt presentation (did not want to exacerbate sx)    MWM for L shoulder scaption immediately improves pain free overhead ROM x 5      Flowsheet Rows      Most Recent Value   PT/OT G-Codes   Current Score  50   Projected Score  65   FOTO information reviewed  Yes   Assessment Type  Evaluation   G code set  Carrying, Moving & Handling Objects   Carrying, Moving and Handling Objects Current Status ()  CK   Carrying, Moving and Handling Objects Goal Status ()  CJ          Precautions: standard, hx of L sided neck pain     Specialty Daily Treatment Diary     Manual         MWM for L shoulder abduction        DTM and TPR to L side upper quarter                                    Exercise Diary         Alt isos        D2 PNF        Wall slides        Rows and ext        Walk outs        IR/ER        Prone rows        Prone ext        YTB overhead press         Wall posture                                                                                             Modalities        Heat pre        Ice post

## 2018-10-09 ENCOUNTER — TRANSCRIBE ORDERS (OUTPATIENT)
Dept: PHYSICAL THERAPY | Facility: CLINIC | Age: 73
End: 2018-10-09

## 2018-10-09 DIAGNOSIS — M25.512 LEFT SHOULDER PAIN, UNSPECIFIED CHRONICITY: Primary | ICD-10-CM

## 2018-10-10 ENCOUNTER — OFFICE VISIT (OUTPATIENT)
Dept: PHYSICAL THERAPY | Facility: CLINIC | Age: 73
End: 2018-10-10
Payer: COMMERCIAL

## 2018-10-10 DIAGNOSIS — M25.512 LEFT SHOULDER PAIN, UNSPECIFIED CHRONICITY: Primary | ICD-10-CM

## 2018-10-10 PROCEDURE — 97112 NEUROMUSCULAR REEDUCATION: CPT

## 2018-10-10 PROCEDURE — 97140 MANUAL THERAPY 1/> REGIONS: CPT

## 2018-10-10 NOTE — PROGRESS NOTES
Daily Note     Today's date: 10/10/2018  Patient name: French Yarbrough  : 1945  MRN: [id number]  Referring provider: Kajal Parekh  Dx:   Encounter Diagnosis     ICD-10-CM    1  Left shoulder pain, unspecified chronicity M25 512                   Subjective: Pt reports that she feels a bit better today than she felt at IE  Pt reports exercises have been helpful thus far  Objective: Pt shows improved overhead mobility and painfree motion following MWM  Reports decreased sx following DTM as well  Precautions: standard, hx of L sided neck pain     Specialty Daily Treatment Diary     Manual  10/10       MWM for L shoulder abduction x10-12 throughout        DTM and TPR to L side upper quarter x10 min to L side upper quarter                                   Exercise Diary  2       Alt isos        D2 PNF        Wall slides x20-25 w/ end range stretch       Rows and ext Green tband 3x10       Walk outs        IR/ER        Prone rows        Prone ext        YTB overhead press         Wall posture  Reviewed         UT stretching 3x30 sec holds         scaption YTB 2x10 total                                                                           Modalities 2       Heat pre x7 min       Ice post  x8 min post                      Assessment: Tolerated treatment well  Patient demonstrated fatigue post treatment and would benefit from continued PT  Pt does well w/ initial follow up, showing good retention of home exercises and good carryover to today's techniques  She is appropriate for higher level stabilization as able  Plan: Continue per plan of care   horizontal abd w/ scap sq, YTB overhead press

## 2018-10-15 ENCOUNTER — OFFICE VISIT (OUTPATIENT)
Dept: PHYSICAL THERAPY | Facility: CLINIC | Age: 73
End: 2018-10-15
Payer: COMMERCIAL

## 2018-10-15 DIAGNOSIS — M25.512 LEFT SHOULDER PAIN, UNSPECIFIED CHRONICITY: Primary | ICD-10-CM

## 2018-10-15 DIAGNOSIS — E03.9 HYPOTHYROIDISM, UNSPECIFIED TYPE: Primary | ICD-10-CM

## 2018-10-15 PROCEDURE — 97140 MANUAL THERAPY 1/> REGIONS: CPT

## 2018-10-15 PROCEDURE — 97110 THERAPEUTIC EXERCISES: CPT

## 2018-10-15 RX ORDER — LEVOTHYROXINE SODIUM 0.1 MG/1
TABLET ORAL
Qty: 90 TABLET | Refills: 3 | Status: SHIPPED | OUTPATIENT
Start: 2018-10-15 | End: 2019-11-04 | Stop reason: SDUPTHER

## 2018-10-15 NOTE — PROGRESS NOTES
Daily Note     Today's date: 10/15/2018  Patient name: Kathleen Ding  : 1945  MRN: [id number]  Referring provider: Almas Herrera  Dx:   Encounter Diagnosis     ICD-10-CM    1  Left shoulder pain, unspecified chronicity M25 512                   Subjective: Pt reports that last night was difficult in terms of sleep  Had a hard time sleeping s/t R lower leg and L shoulder pain  Falls asleep on back but ends up rolling onto side s/t lower leg pain, then L shoulder starts to hurt  Not sure how to fix this  Objective: ROM is full overhead prior to MWM for scaption, pain free PROM and AROM improves post  Requires cueing to decrease UT activation during tband work  Precautions: standard, hx of L sided neck pain     Specialty Daily Treatment Diary     Manual  10/10 10/15      MWM for L shoulder abduction x10-12 throughout  x10 total      DTM and TPR to L side upper quarter x10 min to L side upper quarter x10 min to L side upper quarter                                   Exercise Diary  2 3      Alt isos        D2 PNF        Wall slides x20-25 w/ end range stretch 2x10-15 each      Rows and ext Green tband 3x10 Green tband 3x10 each       Walk outs        IR/ER        Prone rows        Prone ext        YTB overhead press         Wall posture  Reviewed  Reviewed        UT stretching 3x30 sec holds  UT and LS stretching 3x30 sec holds for each, MT 3x30 each       scaption YTB 2x10 total         Self HS stretching, self sciatic nerve flossing education x 5-6 mins                                                                  Modalities 2 3      Heat pre x7 min x8 min pre      Ice post  x8 min post  no                  Assessment: Tolerated treatment well  Patient demonstrated fatigue post treatment and would benefit from continued PT  Pt does well w/ shoulder stab program, notes fatigue but no pain by end   Pt education in HS stretching and nerve flossing was well tolerated but tighter on R as compared to L  Pt will work on this at home, check in at next session  Plan: Continue per plan of care  alt isos   PNF

## 2018-10-17 ENCOUNTER — OFFICE VISIT (OUTPATIENT)
Dept: PHYSICAL THERAPY | Facility: CLINIC | Age: 73
End: 2018-10-17
Payer: COMMERCIAL

## 2018-10-17 DIAGNOSIS — M25.512 LEFT SHOULDER PAIN, UNSPECIFIED CHRONICITY: Primary | ICD-10-CM

## 2018-10-17 PROCEDURE — 97110 THERAPEUTIC EXERCISES: CPT

## 2018-10-17 PROCEDURE — 97140 MANUAL THERAPY 1/> REGIONS: CPT

## 2018-10-17 NOTE — PROGRESS NOTES
Daily Note     Today's date: 10/17/2018  Patient name: Rashid Taylor  : 1945  MRN: [id number]  Referring provider: Gamaliel Sanon  Dx:   Encounter Diagnosis     ICD-10-CM    1  Left shoulder pain, unspecified chronicity M25 512                   Subjective: Pt reports that shoulder is getting better but she still has pain when lifting arm out to side  Gets burning pain in anterior portion of upper arm  Objective: Pt improves overhead mobility post MWM and wall slides into abduction  Quick fatigue w/ RTB horizontal abd w/ scap sq      Precautions: standard, hx of L sided neck pain     Specialty Daily Treatment Diary     Manual  10/10 10/15 10/17     MWM for L shoulder abduction x10-12 throughout  x10 total x10-15 total     DTM and TPR to L side upper quarter x10 min to L side upper quarter x10 min to L side upper quarter  x10-12 min to same area                                 Exercise Diary  2 3 4     Alt isos        D2 PNF        Wall slides x20-25 w/ end range stretch 2x10-15 each 2x10-15 into abduction      Rows and ext Green tband 3x10 Green tband 3x10 each  Green tband 3x10 for each     Walk outs        IR/ER        Prone rows        Prone ext        YTB overhead press         Wall posture  Reviewed  Reviewed        UT stretching 3x30 sec holds  UT and LS stretching 3x30 sec holds for each, MT 3x30 each Review and practice x 4-5 mins       scaption YTB 2x10 total         Self HS stretching, self sciatic nerve flossing education x 5-6 mins Reviewed         RTB horizontal abd w/ scap sq 3x10         Education and practice in median nerve flossing x 3-4 mins                                                  Modalities 2 3 4     Heat pre x7 min x8 min pre 8 min pre     Ice post  x8 min post  no No - per pt request                 Assessment: Tolerated treatment well  Patient demonstrated fatigue post treatment and would benefit from continued PT    Pt does well w/ horizontal abd w/ scap sq, showing good form and posture throughout  She will benefit from higher level posterior chain activation intensity w/ future progressions  Notes fatigue but no increase in pain by end of session  Median nerve flossing provides min relief and will be reassessed in follow ups  Plan: Continue per plan of care   IR/ER and walk outs, wall posture activities

## 2018-10-23 ENCOUNTER — OFFICE VISIT (OUTPATIENT)
Dept: PHYSICAL THERAPY | Facility: CLINIC | Age: 73
End: 2018-10-23
Payer: COMMERCIAL

## 2018-10-23 DIAGNOSIS — M25.512 LEFT SHOULDER PAIN, UNSPECIFIED CHRONICITY: Primary | ICD-10-CM

## 2018-10-23 PROCEDURE — 97140 MANUAL THERAPY 1/> REGIONS: CPT

## 2018-10-23 PROCEDURE — 97110 THERAPEUTIC EXERCISES: CPT

## 2018-10-23 NOTE — PROGRESS NOTES
Daily Note     Today's date: 10/23/2018  Patient name: Lizeth Neil  : 1945  MRN: [id number]  Referring provider: Dasia Kaur  Dx:   Encounter Diagnosis     ICD-10-CM    1  Left shoulder pain, unspecified chronicity M25 512        Start Time: 830  Stop Time: 930  Total time in clinic (min): 60 minutes    Subjective: 7/10 pain left shldr - has difficulty pulling up slacks, reaching out to the side( with elbow bent ) &  pushing down on her arm        Objective: Pt improves overhead mobility post MWM and wall slides into abduction   Quick fatigue w/ RTB horizontal abd w/ scap sq      Precautions: standard, hx of L sided neck pain     Specialty Daily Treatment Diary     Manual  10/10 10/15 10/17 10-23    MWM for L shoulder abduction x10-12 throughout  x10 total x10-15 total no    DTM and TPR to L side upper quarter x10 min to L side upper quarter x10 min to L side upper quarter  x10-12 min to same area X 10 min seated                                 Exercise Diary  2 3 4 5    Alt isos        D2 PNF        Wall slides x20-25 w/ end range stretch 2x10-15 each 2x10-15 into abduction      Rows and ext Green tband 3x10 Green tband 3x10 each  Green tband 3x10 for each GTB 3 x 10 ea     Walk outs        IR/ER        Prone rows        Prone ext        YTB overhead press         Wall posture  Reviewed  Reviewed        UT stretching 3x30 sec holds  UT and LS stretching 3x30 sec holds for each, MT 3x30 each Review and practice x 4-5 mins  LS stretch 6 x 10 sec      scaption YTB 2x10 total   Active scaption 2 x 10       Self HS stretching, self sciatic nerve flossing education x 5-6 mins Reviewed         RTB horizontal abd w/ scap sq 3x10  Not today  Trial YTB       Education and practice in median nerve flossing x 3-4 mins                  Upright row - with cane  painfree range - 2 x 10        Downward unilateral punch from overhead  YTB 3 x 10                         Modalities 2 3 4 5    Heat pre x7 min x8 min pre 8 min pre X 10 min pre     Ice post  x8 min post  no No - per pt request No - home PRN                 Assessment: Tolerated treatment well  Patient would benefit from continued PT; pt reported left arm pain  abolished post trigger point release with soft tissue mobilization; pt reported good tolerance/ pain relief performing scaption; pt able to perform upright rows & shldr depression  if she focuses on painfree range; pt reporting biceps discomfort throughtout session - focus on bicipital CFM next session- encouraged pt to apply ice at home       Plan: Continue per plan of care

## 2018-10-25 ENCOUNTER — OFFICE VISIT (OUTPATIENT)
Dept: PHYSICAL THERAPY | Facility: CLINIC | Age: 73
End: 2018-10-25
Payer: COMMERCIAL

## 2018-10-25 DIAGNOSIS — M25.512 LEFT SHOULDER PAIN, UNSPECIFIED CHRONICITY: Primary | ICD-10-CM

## 2018-10-25 PROCEDURE — 97140 MANUAL THERAPY 1/> REGIONS: CPT

## 2018-10-25 PROCEDURE — 97110 THERAPEUTIC EXERCISES: CPT

## 2018-10-25 NOTE — PROGRESS NOTES
Daily Note     Today's date: 10/25/2018  Patient name: Madison Saab  : 1945  MRN: [id number]  Referring provider: Amy Moore  Dx:   Encounter Diagnosis     ICD-10-CM    1  Left shoulder pain, unspecified chronicity M25 512        Start Time: 08  Stop Time: 930  Total time in clinic (min): 50 minutes    Subjective: Pt states she has occ  pain down the biceps; pulling up her slacks is less painful; lifting up her arm out to the side with the elbow bent is the most painful motion; doing her home exercises        Objective: Pt improves overhead mobility post MWM and wall slides into abduction   Quick fatigue w/ RTB horizontal abd w/ scap sq      Precautions: standard, hx of L sided neck pain     Specialty Daily Treatment Diary     Manual   10/15 10/17 10 10-25   MWM for L shoulder abduction x10-12 throughout  x10 total x10-15 total no No    DTM and TPR to L side upper quarter x10 min to L side upper quarter x10 min to L side upper quarter  x10-12 min to same area X 10 min seated  CFM to left biceps tendon,supra tendon seated x 10 min                                Exercise Diary   3 4 5 6   Alt isos        D2 PNF        Wall slides x20-25 w/ end range stretch 2x10-15 each 2x10-15 into abduction   X 10 into abd    Rows and ext Green tband 3x10 Green tband 3x10 each  Green tband 3x10 for each GTB 3 x 10 ea  GTB 3 x 10 ea    Walk outs        IR/ER     YTB 2 x 10 ea    Prone rows        Prone ext        YTB overhead press         Wall posture  Reviewed  Reviewed        UT stretching 3x30 sec holds  UT and LS stretching 3x30 sec holds for each, MT 3x30 each Review and practice x 4-5 mins  LS stretch 6 x 10 sec  home    scaption YTB 2x10 total   Active scaption 2 x 10  Active scaption 2 x 10      Self HS stretching, self sciatic nerve flossing education x 5-6 mins Reviewed   Biceps stretch seated 6 x 10 sec      RTB horizontal abd w/ scap sq 3x10  Not today  Not today       Education and practice in median nerve flossing x 3-4 mins                  Upright row - with cane  painfree range - 2 x 10 Upright row -sliding hands up/down on hips x 10        Downward unilateral punch from overhead  YTB 3 x 10  No today         Biceps curl w cane x 10                Modalities  3 4 5 6   Heat pre x7 min x8 min pre 8 min pre X 10 min pre  X 10 pre    Ice post  x8 min post  no No - per pt request No - home PRN                   Assessment: Tolerated treatment well  Patient would benefit from continued PT; pt with palpable tenderness & fullness  along the left biceps tendon - pt instructed in CFM to perform at home; pt able to tolerate above exercises including active scaption - performed with good form & improved range this session       Plan: Continue per plan of care

## 2018-10-30 ENCOUNTER — OFFICE VISIT (OUTPATIENT)
Dept: PHYSICAL THERAPY | Facility: CLINIC | Age: 73
End: 2018-10-30
Payer: COMMERCIAL

## 2018-10-30 DIAGNOSIS — M25.512 LEFT SHOULDER PAIN, UNSPECIFIED CHRONICITY: Primary | ICD-10-CM

## 2018-10-30 PROCEDURE — 97140 MANUAL THERAPY 1/> REGIONS: CPT

## 2018-10-30 PROCEDURE — 97110 THERAPEUTIC EXERCISES: CPT

## 2018-10-30 NOTE — PROGRESS NOTES
Daily Note     Today's date: 10/30/2018  Patient name: Cecilio Vines  : 1945  MRN: [id number]  Referring provider: Bruna Flores  Dx:   Encounter Diagnosis     ICD-10-CM    1  Left shoulder pain, unspecified chronicity M25 512        Start Time: 830  Stop Time: 930  Total time in clinic (min): 60 minutes    Subjective: Pt states she still has difficulty with one motion - an upward row with the left arm;  pulling up her slacks has improved; tight in the shldr joint with overhead reach       Objective: Pt improves overhead mobility post MWM and wall slides into abduction   Quick fatigue w/ RTB horizontal abd w/ scap sq      Precautions: standard, hx of L sided neck pain     Specialty Daily Treatment Diary     Manual  10-30   10/17 10-23 10-25   MWM for L shoulder abduction Yes - by primary PT  x10 total x10-15 total no No    DTM and TPR to L side upper quarter x10 min  _ L UT, CFM to biceps tendon x10 min to L side upper quarter  x10-12 min to same area X 10 min seated  CFM to left biceps tendon,supra tendon seated x 10 min     PROM left shldr                            Exercise Diary  7   4 5 6   Alt isos        D2 PNF        Wall slides X 10 into abd  2x10-15 each 2x10-15 into abduction   X 10 into abd    Rows and ext BTB  tband 3x10 ea Green tband 3x10 each  Green tband 3x10 for each GTB 3 x 10 ea  GTB 3 x 10 ea    Walk outs        IR/ER YTB 2 x 10 ea - not today     YTB 2 x 10 ea    Prone rows 2 x 10        Prone ext 2 x 10        YTB overhead press         Wall posture  Reviewed  Reviewed        UT stretching 3x30 sec holds  UT and LS stretching 3x30 sec holds for each, MT 3x30 each Review and practice x 4-5 mins  LS stretch 6 x 10 sec  home    scaption YTB 2x10 total - not today   Active scaption 2 x 10  Active scaption 2 x 10     Biceps stretch 6 x 10 sec  Self HS stretching, self sciatic nerve flossing education x 5-6 mins Reviewed   Biceps stretch seated 6 x 10 sec    Cane - IR, Ext   X 10 ea   RTB horizontal abd w/ scap sq 3x10  Not today  Not today       Education and practice in median nerve flossing x 3-4 mins                  Upright row - with cane  painfree range - 2 x 10 Upright row -sliding hands up/down on hips x 10        Downward unilateral punch from overhead  YTB 3 x 10  No today         Biceps curl w cane x 10                Modalities 7   4 5 6   Heat pre X 10 min  x8 min pre 8 min pre X 10 min pre  X 10 pre    Ice post  Home  no No - per pt request No - home PRN                   Assessment: Tolerated treatment well  Patient would benefit from continued PT; pt continues to have pain with active scaption but decreased when done AA - wall slide & with cane; overhead elevation improved post MWM by primary PT      Plan: Continue per plan of care   Encouraged pt to rest left shldr & apply ice

## 2018-11-01 ENCOUNTER — OFFICE VISIT (OUTPATIENT)
Dept: PHYSICAL THERAPY | Facility: CLINIC | Age: 73
End: 2018-11-01
Payer: COMMERCIAL

## 2018-11-01 DIAGNOSIS — M25.512 LEFT SHOULDER PAIN, UNSPECIFIED CHRONICITY: Primary | ICD-10-CM

## 2018-11-01 PROCEDURE — G8984 CARRY CURRENT STATUS: HCPCS

## 2018-11-01 PROCEDURE — G8985 CARRY GOAL STATUS: HCPCS

## 2018-11-01 PROCEDURE — 97140 MANUAL THERAPY 1/> REGIONS: CPT

## 2018-11-01 PROCEDURE — 97112 NEUROMUSCULAR REEDUCATION: CPT

## 2018-11-01 NOTE — PROGRESS NOTES
Progress Note     Today's date: 2018  Patient name: Montana Cabrera  : 1945  MRN: [id number]  Referring provider: Lisa Alexandre  Dx:   Encounter Diagnosis     ICD-10-CM    1  Left shoulder pain, unspecified chronicity M25 512                   Subjective: Pt reports very little pain today, felt better after last session  Thinks that she is improving overall  Has been avoiding aggravating motions and thinks this is helping  Thinks that she will be okay after a few more weeks of therapy  Has been using tbands and performing HEP on own  Updated functional status to follow:       Goals  Short term goals (3 weeks):  1) Pt will improve L shoulder ROM to equal that of R w/o pain  - progressed   2) Pt will improve L shoulder MMT testing by 1/3 grade pain free  - achieved   3) Pt will report pain at worse <5/10  - achieved   4) Pt will initiate and progress HEP w/ special emphasis on functional L shoulder ROM and stability  - achieved     Long term goals (6 weeks)  1) Pt will improve FOTO to at least 70  - progressed   2) Pt will sleep through the night in positioning of choosing 6/7 nights a week w/o waking due to pain  - progressed  3) Pt will perform full day of household and yard activities to include all overhead lifting w/ improved postural  awareness and lifting mechanics, pain <3/10 throughout  - progressed   4) Pt will be independent and compliant w/ HEP in order to maximize functional benefit of skilled PT following d/c  - progressed    ** LTG extended 2 weeks  New STG 2018:  1) Pt will improve L shoulder ROM to equal that of R w/o pain  2) Pt will improve L shoulder MMT testing by an additional 1/3 grade pain free  3) Pt will abolish all self reports of pain         Pain  No pain reported  Current pain ratin-2  At best pain ratin  At worst pain rating: 3-4  Location: L anterior shoulder, down into L biceps   Quality: radiating and burning  Relieving factors: medications and rest  Aggravating factors: overhead activity and lifting  Progression: worsening    Social Support  Steps to enter house: no  Stairs in house: no   Lives in: multiple-level home  Lives with: spouse    Employment status: not working (retired )  Hand dominance: right    Treatments  Previous treatment: physical therapy (for R elbow fracture)  Patient Goals  Patient goals for therapy: increased strength, decreased pain and increased motion  Patient goal: be able to decrease pain so I can do what I need to do           Objective     Postural Observations  Seated posture: fair  Standing posture: fair  Correction of posture: makes symptoms better        Palpation     Additional Palpation Details  TTP through L anterior shoulder along LHOB, mod increase in tissue density through L posterior upper quarter     Cervical/Thoracic Screen   Cervical range of motion within normal limits    Neurological Testing     Sensation     Shoulder   Left Shoulder   Intact: light touch    Right Shoulder   Intact: light touch    Active Range of Motion   Left Shoulder   Normal active range of motion    Right Shoulder   Normal active range of motion    **painful abduction arc on L, decreased as compared to IE    Passive Range of Motion   Left Shoulder   Normal passive range of motion    Right Shoulder   Normal passive range of motion    Scapular Mobility   Left Shoulder   Scapular mobility: fair  Scapular Mobility with Shoulder to 90° FF   Scapular winging: minimal  Scapular elevation: minimal  Upward rotation: fair    Strength/Myotome Testing     Left Shoulder     Planes of Motion   Flexion: 4  Extension: 4  Abduction: 4  Adduction: 4   External rotation at 0°: 4+  Internal rotation at 0°: 4+     Right Shoulder     Planes of Motion   Flexion: 4+   Extension: 4+   Abduction: 4+   Adduction: 4+   External rotation at 0°: 4+   Internal rotation at 0°: 4+     Additional Strength Details  Elbow flex and ext 4+/5 B w/o pain    MT and LT grossly 4/5 w/o pain     Tests     Additional Tests Details  (+) painful arc    MWM for L shoulder scaption immediately improves pain free overhead ROM 2x10        Precautions: standard, hx of L sided neck pain     Specialty Daily Treatment Diary      Manual  10-30  11/1  10-23 10-25   MWM for L shoulder abduction Yes - by primary PT  2x8-10 throughout  no No    DTM and TPR to L side upper quarter x10 min  _ L UT, CFM to biceps tendon x6-7 mins to R UT/LS   X 10 min seated  CFM to left biceps tendon,supra tendon seated x 10 min     PROM left shldr                            Exercise Diary  7  8  5 6   Alt isos        D2 PNF        Wall slides X 10 into abd  2x10 into scaption    X 10 into abd    Rows and ext BTB  tband 3x10 ea Blue tband 3x10 for each  GTB 3 x 10 ea  GTB 3 x 10 ea    Walk outs        IR/ER YTB 2 x 10 ea - not today  YTB active 3x10 for each   YTB 2 x 10 ea    Prone rows 2 x 10        Prone ext 2 x 10        YTB overhead press   YTB scaption 2x10       Wall posture  Reviewed         UT stretching 3x30 sec holds  Self   LS stretch 6 x 10 sec  home    scaption YTB 2x10 total - not today   Active scaption 2 x 10  Active scaption 2 x 10     Biceps stretch 6 x 10 sec     Biceps stretch seated 6 x 10 sec    Cane - IR, Ext   X 10 ea  Review   Not today  Not today                        Upright row - with cane  painfree range - 2 x 10 Upright row -sliding hands up/down on hips x 10        Downward unilateral punch from overhead  YTB 3 x 10  No today         Biceps curl w cane x 10                Modalities 7  8  5 6   Heat pre X 10 min  x8 min pre  X 10 min pre  X 10 pre    Ice post  Home  Home   No - home PRN                     Assessment: Tolerated treatment well  Patient demonstrated fatigue post treatment, exhibited good technique with therapeutic exercises and would benefit from continued PT  Shows improving tolerance to all therapy exercises   Requires heavy cueing to avoid straight abduction w/ exercises but corrects well and maintains  Verbalizes good understanding of importance of maintaining scaption as opposed to true abduction  Able to perform scaption w/ resistance w/o shoulder hike today  Encouraged to continue w/ HEP on own  Making good progress, or has achieved, all goals set at IE  Will continue for next 2-3 weeks w/ eye towards d/c to HEP at that time  Plan: Continue per plan of care   body blade, gentle overhead work, MWM progressions

## 2018-11-06 ENCOUNTER — OFFICE VISIT (OUTPATIENT)
Dept: PHYSICAL THERAPY | Facility: CLINIC | Age: 73
End: 2018-11-06
Payer: COMMERCIAL

## 2018-11-06 DIAGNOSIS — M25.512 LEFT SHOULDER PAIN, UNSPECIFIED CHRONICITY: Primary | ICD-10-CM

## 2018-11-06 PROCEDURE — 97110 THERAPEUTIC EXERCISES: CPT

## 2018-11-06 NOTE — PROGRESS NOTES
Daily Note     Today's date: 2018  Patient name: French Yarbrough  : 1945  MRN: [id number]  Referring provider: Kajal Parekh  Dx:   Encounter Diagnosis     ICD-10-CM    1   Left shoulder pain, unspecified chronicity M25 512        Start Time: 825  Stop Time: 915  Total time in clinic (min): 50 minutes    Subjective: 2/10 pain left shldr - thinks it may be the way she is sleeping; some days she has very little pain but still has difficulty elevating the shldr out to the side with her elbow bent       Objective: See treatment diary below        Precautions: standard, hx of L sided neck pain     Specialty Daily Treatment Diary      Manual  10-30  11/1 11-6  10-25   MWM for L shoulder abduction Yes - by primary PT  2x8-10 throughout 1 x 8 f/b wallslides  no No    DTM and TPR to L side upper quarter x10 min  _ L UT, CFM to biceps tendon x6-7 mins to R UT/LS  X 10 min to left UT/LS/rhomboids  X 10 min seated  CFM to left biceps tendon,supra tendon seated x 10 min     PROM left shldr                            Exercise Diary  7  8 9  6   Alt isos        D2 PNF        Wall slides X 10 into abd  2x10 into scaption  2 x10 into scaption   X 10 into abd    Rows and ext BTB  tband 3x10 ea Blue tband 3x10 for each BTB 3 x 10 ea  GTB 3 x 10 ea  GTB 3 x 10 ea    Walk outs        IR/ER YTB 2 x 10 ea - not today  YTB active 3x10 for each YTB 3 x 10 ea   Issued YTB   YTB 2 x 10 ea    Prone rows 2 x 10        Prone ext 2 x 10        YTB overhead press   YTB scaption 2x10  Active scaption - no tband 2 x 10      Wall posture  Reviewed         UT stretching 3x30 sec holds  Self  Self 3 x 30 sec  LS stretch 6 x 10 sec  home    scaption YTB 2x10 total - not today   Active scaption 2 x 10  Active scaption 2 x 10     Biceps stretch 6 x 10 sec   Biceps stretch   6 x 10 sec   Biceps stretch seated 6 x 10 sec    Cane - IR, Ext   X 10 ea  Review  Cane IR, Ext   X 10 ea - not today  Not today  Not today Upright row - with cane  painfree range - 2 x 10 Upright row -sliding hands up/down on hips x 10        Downward unilateral punch from overhead  YTB 3 x 10  No today         Biceps curl w cane x 10                Modalities 7  8 9  6   Heat pre X 10 min  x8 min pre X 10 min pre  X 10 min pre  X 10 pre    Ice post  Home  Home  home No - home PRN                       Assessment: Tolerated treatment well  Patient would benefit from continued PT; pt demonstrated improved range with active scaption ( no weight/no resistance ); frequency & intensity of upper arm pain decreasing       Plan: Continue per plan of care   Pt instructed in ice massage to biceps tendon

## 2018-11-08 ENCOUNTER — OFFICE VISIT (OUTPATIENT)
Dept: PHYSICAL THERAPY | Facility: CLINIC | Age: 73
End: 2018-11-08
Payer: COMMERCIAL

## 2018-11-08 DIAGNOSIS — M25.512 LEFT SHOULDER PAIN, UNSPECIFIED CHRONICITY: Primary | ICD-10-CM

## 2018-11-08 PROCEDURE — 97110 THERAPEUTIC EXERCISES: CPT

## 2018-11-08 NOTE — PROGRESS NOTES
Daily Note     Today's date: 2018  Patient name: Stephany Hathaway  : 1945  MRN: [id number]  Referring provider: Chanell French  Dx:   Encounter Diagnosis     ICD-10-CM    1   Left shoulder pain, unspecified chronicity M25 512        Start Time: 820  Stop Time: 920  Total time in clinic (min): 60 minutes    Subjective: Pt denies left shldr pain at present but still has pain lifting the arm out to the side ( straight abduction); did well with the ice massage & continues to do the home exercises    Objective: See treatment diary below        Precautions: standard, hx of L sided neck pain     Specialty Daily Treatment Diary      Manual  10-30  11/1 11-6 11-8    MWM for L shoulder abduction Yes - by primary PT  2x8-10 throughout 1 x 8 f/b wallslides  By primary PT  No    DTM and TPR to L side upper quarter x10 min  _ L UT, CFM to biceps tendon x6-7 mins to R UT/LS  X 10 min to left UT/LS/rhomboids  X 10 min seated  CFM to left biceps tendon,supra tendon seated x 10 min     PROM left shldr                            Exercise Diary  7  8 9 10    Alt isos        D2 PNF        Wall slides X 10 into abd  2x10 into scaption  2 x10 into scaption  X 10  X 10 into abd    Rows and ext BTB  tband 3x10 ea Blue tband 3x10 for each BTB 3 x 10 ea  BTB  3 x 10 ea  GTB 3 x 10 ea    Walk outs        IR/ER YTB 2 x 10 ea - not today  YTB active 3x10 for each YTB 3 x 10 ea   Issued YTB  YTB 3 x 10 ea  YTB 2 x 10 ea    Prone rows 2 x 10        Prone ext 2 x 10        YTB overhead press   YTB scaption 2x10  Active scaption - no tband 2 x 10  Active scaption - no tband 2 x 10     Wall posture  Reviewed         UT stretching 3x30 sec holds  Self  Self 3 x 30 sec  LS stretch 6 x 10 sec  home    scaption YTB 2x10 total - not today    Active scaption 2 x 10     Biceps stretch 6 x 10 sec   Biceps stretch   6 x 10 sec  Home  Biceps stretch seated 6 x 10 sec    Cane - IR, Ext   X 10 ea  Review  Cane IR, Ext   X 10 ea - not today Not today  Not today                         Upright row -sliding hands up/down on hips x 10         No today         Biceps curl w cane x 10                Modalities 7  8 9 10     Heat pre X 10 min  x8 min pre X 10 min pre  X 10 min pre  X 10 pre    Ice post  Home  Home  home No - home PRN                     Assessment: Tolerated treatment well  Patient would benefit from continued PT; pt demonstrating improved active shldr flexion in stance with increased range - when  1 lb added in stance flexion pt  tolerated 10 reps; unweighted scaption improving; palpable tenderness left biceps tendon & supraspinatus tendon left mid deltoid       Plan: Continue per plan of care   Pt to continue with rest, ice massage; issue blue tband next session for rows, ext

## 2018-11-13 ENCOUNTER — OFFICE VISIT (OUTPATIENT)
Dept: PHYSICAL THERAPY | Facility: CLINIC | Age: 73
End: 2018-11-13
Payer: COMMERCIAL

## 2018-11-13 DIAGNOSIS — M25.512 LEFT SHOULDER PAIN, UNSPECIFIED CHRONICITY: Primary | ICD-10-CM

## 2018-11-13 PROCEDURE — 97110 THERAPEUTIC EXERCISES: CPT

## 2018-11-13 NOTE — PROGRESS NOTES
Daily Note     Today's date: 2018  Patient name: Muriel Nagy  : 1945  MRN: [id number]  Referring provider: Diamante Kelsey  Dx:   Encounter Diagnosis     ICD-10-CM    1   Left shoulder pain, unspecified chronicity M25 512        Start Time: 820  Stop Time: 920  Total time in clinic (min): 60 minutes    Subjective: " The same" - some days the arm is ok & some days lifting it out to the side is difficult & painful       Objective: See treatment diary below        Precautions: standard, hx of L sided neck pain     Specialty Daily Treatment Diary      Manual    116 11-8 11-13   MWM for L shoulder abduction Yes - by primary PT  2x8-10 throughout 1 x 8 f/b wallslides  By primary PT  No    DTM and TPR to L side upper quarter x10 min  _ L UT, CFM to biceps tendon x6-7 mins to R UT/LS  X 10 min to left UT/LS/rhomboids  X 10 min seated  CFM to left biceps tendon,supra tendon & UT seated x 10 min     PROM left shldr                            Exercise Diary   8 9 10 11   Alt isos        D2 PNF        Wall slides X 10 into abd  2x10 into scaption  2 x10 into scaption  X 10  X 10 into abd    Rows and ext BTB  tband 3x10 ea Blue tband 3x10 for each BTB 3 x 10 ea  BTB  3 x 10 ea  BTB 3 x 10 ea    Walk outs        IR/ER YTB 2 x 10 ea - not today  YTB active 3x10 for each YTB 3 x 10 ea   Issued YTB  YTB 3 x 10 ea  YTB 2 x 10 ea    Prone rows 2 x 10     scaption w cane x 10    Prone ext 2 x 10        YTB overhead press   YTB scaption 2x10  Active scaption - no tband 2 x 10  Active scaption - no tband 2 x 10  Active scaption stance 2 x 10    Wall posture  Reviewed         UT stretching 3x30 sec holds  Self  Self 3 x 30 sec  LS stretch 6 x 10 sec  home    scaption YTB 2x10 total - not today    scap retractions stance - 2x 10     Biceps stretch 6 x 10 sec   Biceps stretch   6 x 10 sec  Home  Biceps stretch seated 6 x 10 sec - home     Cane - IR, Ext   X 10 ea  Review  Cane IR, Ext   X 10 ea - not today Not today  Biceps - hammer curl 1 lb 2 x 10                                                        Modalities  8 9 10  11   Heat pre X 10 min  x8 min pre X 10 min pre  X 10 min pre  X 10 pre    Ice post  Home  Home  home No - home PRN  X 10 post                    Assessment: Tolerated treatment well  Patient would benefit from continued PT; this session  decreased tightness &  Tenderness with soft tissue mobilization  along the left biceps tendon & RC insertion left mid deltoid; pt tolerated scaption therex - both AA & active without pain post  manual & verbal cueing for correct technique       Plan: Continue per plan of care   Pt to continue with her home program & add 1 lb bicep curls, moist heat pre stretching & continue with ice post therex

## 2018-11-15 ENCOUNTER — OFFICE VISIT (OUTPATIENT)
Dept: PHYSICAL THERAPY | Facility: CLINIC | Age: 73
End: 2018-11-15
Payer: COMMERCIAL

## 2018-11-15 DIAGNOSIS — M25.512 LEFT SHOULDER PAIN, UNSPECIFIED CHRONICITY: Primary | ICD-10-CM

## 2018-11-15 PROCEDURE — G8984 CARRY CURRENT STATUS: HCPCS

## 2018-11-15 PROCEDURE — 97110 THERAPEUTIC EXERCISES: CPT

## 2018-11-15 PROCEDURE — G8985 CARRY GOAL STATUS: HCPCS

## 2018-11-15 PROCEDURE — G8986 CARRY D/C STATUS: HCPCS

## 2018-11-15 NOTE — PROGRESS NOTES
Daily Note     Today's date: 11/15/2018  Patient name: Franklyn Finch  : 1945  MRN: [id number]  Referring provider: Roderick Christianson  Dx:   Encounter Diagnosis     ICD-10-CM    1   Left shoulder pain, unspecified chronicity M25 512        Start Time: 830  Stop Time: 925  Total time in clinic (min): 55 minutes    Subjective: Pt states her left shldr is much better since starting therapy - can lift the arm out to the side & up without nearly the pain she once had; doing the exercises, massage & applying ice       Objective: See treatment diary below        Precautions: standard, hx of L sided neck pain     Specialty Daily Treatment Diary      Manual  11-15  FOTO done   11-6 11-8 11-13   MWM for L shoulder abduction No  2x8-10 throughout 1 x 8 f/b wallslides  By primary PT  No    DTM and TPR to L side upper quarter x10 min  _ L UT, CFM to biceps tendon & insertion of RC tendon left mid deltoid x6-7 mins to R UT/LS  X 10 min to left UT/LS/rhomboids  X 10 min seated  CFM to left biceps tendon,supra tendon & UT seated x 10 min                                Exercise Diary  12  9 10 11   Alt isos        D2 PNF        Wall slides X 10 into abd - home  2x10 into scaption  2 x10 into scaption  X 10  X 10 into abd    Rows and ext Black tband - issued  3x10 ea Blue tband 3x10 for each BTB 3 x 10 ea  BTB  3 x 10 ea  BTB 3 x 10 ea    Walk outs        IR/ER YTB 2 x 10 ea  YTB active 3x10 for each YTB 3 x 10 ea   Issued YTB  YTB 3 x 10 ea  YTB 2 x 10 ea    Prone rows 2 x 10 - no     scaption w cane x 10    Prone ext 2 x 10 - no        YTB overhead press  Active scaption 2 x 10  YTB scaption 2x10  Active scaption - no tband 2 x 10  Active scaption - no tband 2 x 10  Active scaption stance 2 x 10    Wall posture  Reviewed         UT stretching 3x30 sec holds - home  Self  Self 3 x 30 sec  LS stretch 6 x 10 sec  home    scap retractions stance 2 x 10     scap retractions stance - 2x 10     Biceps stretch 6 x 10 sec - home   Biceps stretch   6 x 10 sec  Home  Biceps stretch seated 6 x 10 sec - home     Biceps - hammer curl 2 lb 2 x 10  Review  Cane IR, Ext   X 10 ea - not today  Not today  Biceps - hammer curl 1 lb 2 x 10                                                        Modalities 12  9 10  11   Heat pre X 10 min  x8 min pre X 10 min pre  X 10 min pre  X 10 pre    Ice post  Home  Home  home No - home PRN  X 10 post            Assessment: Pt demonstrated improved painfree  range with active scaption ( initial complaint on eval );  pt progressed to black tband for scap retractions & shldr ext ( black tband issued ); pt has a good understanding of her home program; FOTO score improved since [place]; Pt to follow up with MD 11-19-18 for his recommendation; pt equipped to continue with her home program if MD SCHROEDER's PT

## 2018-11-15 NOTE — PROGRESS NOTES
Progress Note:      Goals  Short term goals (3 weeks):  1) Pt will improve L shoulder ROM to equal that of R w/o pain  - achieved  2) Pt will improve L shoulder MMT testing by 1/3 grade pain free  - achieved   3) Pt will report pain at worse <5/10  - achieved   4) Pt will initiate and progress HEP w/ special emphasis on functional L shoulder ROM and stability  - achieved     Long term goals (6 weeks)  1) Pt will improve FOTO to at least 70  - progressed   2) Pt will sleep through the night in positioning of choosing 6/7 nights a week w/o waking due to pain  - achieved   3) Pt will perform full day of household and yard activities to include all overhead lifting w/ improved postural  awareness and lifting mechanics, pain <3/10 throughout  - progressed/achieved   4) Pt will be independent and compliant w/ HEP in order to maximize functional benefit of skilled PT following d/c  - achieved     ** LTG extended 2 weeks  New STG 2018:  1) Pt will improve L shoulder ROM to equal that of R w/o pain  - achieved   2) Pt will improve L shoulder MMT testing by an additional 1/3 grade pain free  - achieved   3) Pt will abolish all self reports of pain  - progressed       Pain  No pain reported  Current pain ratin  At best pain ratin  At worst pain ratin  Location: L anterior shoulder, down into L biceps   Quality: radiating and burning  Relieving factors: medications and rest  Aggravating factors: overhead activity and lifting  Progression: improved     Social Support  Steps to enter house: no  Stairs in house: no   Lives in: multiple-level home  Lives with: spouse    Employment status: not working (retired )  Hand dominance: right    Treatments  Previous treatment: physical therapy (for R elbow fracture)  Patient Goals  Patient goals for therapy: increased strength, decreased pain and increased motion  Patient goal: be able to decrease pain so I can do what I need to do           Objective Postural Observations  Seated posture: fair  Standing posture: fair  Correction of posture: makes symptoms better        Palpation     Additional Palpation Details  No significant TTP     Cervical/Thoracic Screen   Cervical range of motion within normal limits    Neurological Testing     Sensation     Shoulder   Left Shoulder   Intact: light touch    Right Shoulder   Intact: light touch    Active Range of Motion   Left Shoulder   Normal active range of motion    Right Shoulder   Normal active range of motion    Passive Range of Motion   Left Shoulder   Normal passive range of motion    Right Shoulder   Normal passive range of motion    Strength/Myotome Testing     Left Shoulder     Planes of Motion   Flexion: 4+  Extension: 4+  Abduction: 4+  Adduction: 4+   External rotation at 0°: 4+  Internal rotation at 0°: 4+     Right Shoulder     Planes of Motion   Flexion: 4+   Extension: 4+   Abduction: 4+   Adduction: 4+   External rotation at 0°: 4+   Internal rotation at 0°: 4+     Additional Strength Details  Elbow flex and ext 4+/5 B w/o pain    MT and LT grossly 4+/5 w/o pain       Assessment: Pt has made excellent progress in skilled PT and will follow up w/ MD next week  She will be d/c pending MD clearance  She has done well w/ exercises and notes very minimal pain  Will continue to avoid pure abduction as able as this is the motion that provokes sx  She will benefit from continuation of home program  Notes no complaints upon leaving clinic  Chart to be d/c pending MD clearance next week  Update 11/21/2018: Pt will be d/c as MD follow up went well  She will continue w/ home program and follow up w/ any future issues

## 2018-11-19 ENCOUNTER — OFFICE VISIT (OUTPATIENT)
Dept: OBGYN CLINIC | Facility: CLINIC | Age: 73
End: 2018-11-19
Payer: COMMERCIAL

## 2018-11-19 VITALS
DIASTOLIC BLOOD PRESSURE: 76 MMHG | BODY MASS INDEX: 30.48 KG/M2 | HEART RATE: 67 BPM | HEIGHT: 63 IN | SYSTOLIC BLOOD PRESSURE: 137 MMHG | WEIGHT: 172 LBS

## 2018-11-19 DIAGNOSIS — M75.42 SUBACROMIAL IMPINGEMENT OF LEFT SHOULDER: ICD-10-CM

## 2018-11-19 DIAGNOSIS — M75.82 ROTATOR CUFF TENDINITIS, LEFT: Primary | ICD-10-CM

## 2018-11-19 PROCEDURE — 99213 OFFICE O/P EST LOW 20 MIN: CPT | Performed by: ORTHOPAEDIC SURGERY

## 2018-11-19 PROCEDURE — 4040F PNEUMOC VAC/ADMIN/RCVD: CPT | Performed by: ORTHOPAEDIC SURGERY

## 2018-11-19 NOTE — PROGRESS NOTES
Assessment/Plan:  1  Rotator cuff tendinitis, left     2  Subacromial impingement of left shoulder         Scribe Attestation    I,:   Sabine Guzmán am acting as a scribe while in the presence of the attending physician :        I,:   Mouna Manzanares MD personally performed the services described in this documentation    as scribed in my presence :              Oscar Garzon upon examination today appears to have greatly improved with her range of motion overall strength  She demonstrates mild discomfort with external rotation and no discomfort with abduction of the shoulder  I do feel that she will continue to improve with continue maintenance with a home exercise program that she was previously prescribed by physical therapy  At this time she may discontinue formal therapy and continue with home exercises  Oscar Garzon understood the plan and verbalized agreement  Should she experience recurrent symptoms she may follow up with me  Otherwise, she may follow up with me on as-needed basis  Subjective:   Muriel Nagy is a 71 y o  female who presents for follow-up evaluation of her left shoulder  She was being treated conservatively physical therapy for left shoulder rotator cuff tendinitis and subacromial bursitis  This has been ongoing issue over the last 4 months or so  She states that she feels 90% better  And notes only mild discomfort with overhead activities that is sporadic  She states that physical therapy has greatly improved the range of motion overall strength  Today she denies any distal paresthesias      Review of Systems   Constitutional: Negative for chills, fever and unexpected weight change  HENT: Negative for hearing loss, nosebleeds and sore throat  Eyes: Negative for pain, redness and visual disturbance  Respiratory: Negative for cough, shortness of breath and wheezing  Cardiovascular: Negative for chest pain, palpitations and leg swelling     Gastrointestinal: Negative for abdominal pain, nausea and vomiting  Endocrine: Negative for polydipsia and polyuria  Genitourinary: Negative for dysuria and hematuria  Musculoskeletal: Positive for arthralgias and myalgias  See HPI   Skin: Negative for rash and wound  Neurological: Negative for dizziness, numbness and headaches  Psychiatric/Behavioral: Negative for decreased concentration and suicidal ideas  The patient is not nervous/anxious  Past Medical History:   Diagnosis Date    Arthropathy 10/15/2010    Disease of thyroid gland     Hyperlipidemia     Hypertension     Onychomycosis 03/04/2005       Past Surgical History:   Procedure Laterality Date    EYE SURGERY      bilateral IOL    NC OPEN TX RADIAL HEAD/NECK FRACTURE PROSTHETIC Right 12/5/2016    Procedure: OPEN REDUCTION W/ INTERNAL FIXATION (ORIF) ELBOW;  Surgeon: Yesica Alvarez MD;  Location: [facility];  Service: Orthopedics       Family History   Problem Relation Age of Onset    Diabetes Sister        Social History     Occupational History    Not on file       Social History Main Topics    Smoking status: Never Smoker    Smokeless tobacco: Never Used    Alcohol use No    Drug use: No    Sexual activity: Not on file         Current Outpatient Prescriptions:     amLODIPine (NORVASC) 5 mg tablet, Take 1 tablet (5 mg total) by mouth daily, Disp: 90 tablet, Rfl: 1    atenolol (TENORMIN) 25 mg tablet, Take 1 tablet (25 mg total) by mouth 2 (two) times a day, Disp: 180 tablet, Rfl: 3    atorvastatin (LIPITOR) 20 mg tablet, Take 1 tablet (20 mg total) by mouth daily, Disp: 90 tablet, Rfl: 1    levothyroxine 100 mcg tablet, take 1 tablet by mouth once daily, Disp: 90 tablet, Rfl: 3    losartan-hydrochlorothiazide (HYZAAR) 100-12 5 MG per tablet, take 1 tablet by mouth once daily, Disp: 90 tablet, Rfl: 1    Allergies   Allergen Reactions    Amoxicillin Itching and Other (See Comments)     Other reaction(s): tingling    Penicillins Rash Objective:  Vitals:    11/19/18 1006   BP: 137/76   Pulse: 67       Left Shoulder Exam     Tenderness   Left shoulder tenderness location: Anterior aspect along the biceps muscle belly  Range of Motion   Active Abduction: 170   Passive Abduction: 170   Extension: 60   Forward Flexion: 170   External Rotation: 70   Internal Rotation 0 degrees: L2     Muscle Strength   Left shoulder normal muscle strength: Mild discomfort with external rotation  Abduction: 5/5   Internal Rotation: 5/5   External Rotation: 5/5     Tests   Hawkin's test: negative  Impingement: negative    Other   Erythema: absent  Sensation: normal  Pulse: present     Comments:  Speed's test:  Positive  Empty can test:  Negative            Physical Exam   Constitutional: She is oriented to person, place, and time  She appears well-developed and well-nourished  HENT:   Head: Normocephalic and atraumatic  Eyes: Conjunctivae are normal  Right eye exhibits no discharge  Left eye exhibits no discharge  Neck: Normal range of motion  Neck supple  Cardiovascular: Normal rate and intact distal pulses  Pulmonary/Chest: Effort normal  No respiratory distress  Musculoskeletal:   As noted in HPI   Neurological: She is alert and oriented to person, place, and time  Skin: Skin is warm and dry  Psychiatric: She has a normal mood and affect  Her behavior is normal  Judgment and thought content normal    Nursing note and vitals reviewed

## 2018-12-04 LAB
ALBUMIN SERPL-MCNC: 4.4 G/DL (ref 3.5–4.8)
ALBUMIN/GLOB SERPL: 1.5 {RATIO} (ref 1.2–2.2)
ALP SERPL-CCNC: 80 IU/L (ref 39–117)
ALT SERPL-CCNC: 28 IU/L (ref 0–32)
AST SERPL-CCNC: 22 IU/L (ref 0–40)
BILIRUB SERPL-MCNC: 0.4 MG/DL (ref 0–1.2)
BUN SERPL-MCNC: 13 MG/DL (ref 8–27)
BUN/CREAT SERPL: 15 (ref 12–28)
CALCIUM SERPL-MCNC: 9.4 MG/DL (ref 8.7–10.3)
CHLORIDE SERPL-SCNC: 98 MMOL/L (ref 96–106)
CO2 SERPL-SCNC: 24 MMOL/L (ref 20–29)
CREAT SERPL-MCNC: 0.88 MG/DL (ref 0.57–1)
GLOBULIN SER-MCNC: 2.9 G/DL (ref 1.5–4.5)
GLUCOSE SERPL-MCNC: 102 MG/DL (ref 65–99)
HBA1C MFR BLD: 5.7 % (ref 4.8–5.6)
HCV AB S/CO SERPL IA: 0.1 S/CO RATIO (ref 0–0.9)
LABCORP COMMENT: NORMAL
POTASSIUM SERPL-SCNC: 3.8 MMOL/L (ref 3.5–5.2)
PROT SERPL-MCNC: 7.3 G/DL (ref 6–8.5)
SL AMB EGFR AFRICAN AMERICAN: 76 ML/MIN/1.73
SL AMB EGFR NON AFRICAN AMERICAN: 66 ML/MIN/1.73
SODIUM SERPL-SCNC: 140 MMOL/L (ref 134–144)
TSH SERPL DL<=0.005 MIU/L-ACNC: 3.88 UIU/ML (ref 0.45–4.5)

## 2018-12-06 ENCOUNTER — HOSPITAL ENCOUNTER (OUTPATIENT)
Dept: RADIOLOGY | Facility: HOSPITAL | Age: 73
Discharge: HOME | End: 2018-12-06
Attending: FAMILY MEDICINE
Payer: COMMERCIAL

## 2018-12-06 VITALS — HEIGHT: 62 IN | WEIGHT: 168 LBS | BODY MASS INDEX: 30.91 KG/M2

## 2018-12-06 DIAGNOSIS — Z78.0 MENOPAUSE: ICD-10-CM

## 2018-12-06 DIAGNOSIS — Z12.39 BREAST CANCER SCREENING: ICD-10-CM

## 2018-12-06 PROCEDURE — 77067 SCR MAMMO BI INCL CAD: CPT

## 2018-12-06 PROCEDURE — 77080 DXA BONE DENSITY AXIAL: CPT

## 2018-12-13 DIAGNOSIS — E78.5 HYPERLIPIDEMIA LDL GOAL <130: ICD-10-CM

## 2018-12-13 RX ORDER — ATORVASTATIN CALCIUM 20 MG/1
TABLET, FILM COATED ORAL
Qty: 90 TABLET | Refills: 1 | Status: SHIPPED | OUTPATIENT
Start: 2018-12-13 | End: 2019-06-07 | Stop reason: SDUPTHER

## 2018-12-19 ENCOUNTER — OFFICE VISIT (OUTPATIENT)
Dept: FAMILY MEDICINE CLINIC | Facility: CLINIC | Age: 73
End: 2018-12-19
Payer: COMMERCIAL

## 2018-12-19 VITALS
HEART RATE: 68 BPM | HEIGHT: 62 IN | RESPIRATION RATE: 16 BRPM | TEMPERATURE: 97.3 F | BODY MASS INDEX: 31.28 KG/M2 | SYSTOLIC BLOOD PRESSURE: 138 MMHG | DIASTOLIC BLOOD PRESSURE: 82 MMHG | WEIGHT: 170 LBS

## 2018-12-19 DIAGNOSIS — Z00.00 MEDICARE ANNUAL WELLNESS VISIT, SUBSEQUENT: Primary | ICD-10-CM

## 2018-12-19 DIAGNOSIS — I10 BENIGN ESSENTIAL HYPERTENSION: ICD-10-CM

## 2018-12-19 DIAGNOSIS — E03.9 HYPOTHYROIDISM, UNSPECIFIED TYPE: ICD-10-CM

## 2018-12-19 DIAGNOSIS — E78.5 HYPERLIPIDEMIA LDL GOAL <130: ICD-10-CM

## 2018-12-19 DIAGNOSIS — E66.9 OBESITY (BMI 30-39.9): ICD-10-CM

## 2018-12-19 PROCEDURE — 3079F DIAST BP 80-89 MM HG: CPT | Performed by: FAMILY MEDICINE

## 2018-12-19 PROCEDURE — 3075F SYST BP GE 130 - 139MM HG: CPT | Performed by: FAMILY MEDICINE

## 2018-12-19 PROCEDURE — 99214 OFFICE O/P EST MOD 30 MIN: CPT | Performed by: FAMILY MEDICINE

## 2018-12-19 PROCEDURE — G0439 PPPS, SUBSEQ VISIT: HCPCS | Performed by: FAMILY MEDICINE

## 2018-12-19 PROCEDURE — 3008F BODY MASS INDEX DOCD: CPT | Performed by: FAMILY MEDICINE

## 2018-12-19 PROCEDURE — 1160F RVW MEDS BY RX/DR IN RCRD: CPT | Performed by: FAMILY MEDICINE

## 2018-12-19 PROCEDURE — 1125F AMNT PAIN NOTED PAIN PRSNT: CPT | Performed by: FAMILY MEDICINE

## 2018-12-19 PROCEDURE — 1170F FXNL STATUS ASSESSED: CPT | Performed by: FAMILY MEDICINE

## 2018-12-19 PROCEDURE — 1036F TOBACCO NON-USER: CPT | Performed by: FAMILY MEDICINE

## 2018-12-19 NOTE — PROGRESS NOTES
Assessment/Plan:    No problem-specific Assessment & Plan notes found for this encounter  Call for cologuard if covered    Htn/chol/hypothyroid stable  Labs reviewed  Obesity stable  bmi advised     Diagnoses and all orders for this visit:    Benign essential hypertension  -     Comprehensive metabolic panel; Future    Hypothyroidism, unspecified type  -     TSH, 3rd generation; Future    Hyperlipidemia LDL goal <130  -     Lipid Panel with Direct LDL reflex; Future    Obesity (BMI 30-39  9)    BMI 31 0-31 9,adult    Medicare annual wellness visit, subsequent              Return in about 6 months (around 6/19/2019) for Recheck  Subjective:      Patient ID: Montana Cabrera is a 71 y o  female  Chief Complaint   Patient presents with    Follow-up     6 month f/u       HPI  Tolerating meds  No cp or sob  No edema  On low fat diet  No myalgias on statin  Use of statins for the purposes of CVD/CVA risks reduction was advised according to USPSTF guidelines along with appropriate continued liver monitoring  Thyroid stable  LT4 daily with compliances  No bowel changes    The following portions of the patient's history were reviewed and updated as appropriate: allergies, current medications, past family history, past medical history, past social history, past surgical history and problem list     Review of Systems   Respiratory: Negative for shortness of breath  Cardiovascular: Negative for chest pain and leg swelling  Gastrointestinal: Negative for bowel incontinence  Musculoskeletal: Negative for arthralgias and myalgias  Psychiatric/Behavioral: The patient is not nervous/anxious            Current Outpatient Prescriptions   Medication Sig Dispense Refill    amLODIPine (NORVASC) 5 mg tablet Take 1 tablet (5 mg total) by mouth daily 90 tablet 1    atenolol (TENORMIN) 25 mg tablet Take 1 tablet (25 mg total) by mouth 2 (two) times a day 180 tablet 3    atorvastatin (LIPITOR) 20 mg tablet take 1 tablet by mouth once daily 90 tablet 1    levothyroxine 100 mcg tablet take 1 tablet by mouth once daily 90 tablet 3    losartan-hydrochlorothiazide (HYZAAR) 100-12 5 MG per tablet take 1 tablet by mouth once daily 90 tablet 1     No current facility-administered medications for this visit  Objective:    /82   Pulse 68   Temp (!) 97 3 °F (36 3 °C)   Resp 16   Ht 5' 2" (1 575 m)   Wt 77 1 kg (170 lb)   BMI 31 09 kg/m²        Physical Exam   Constitutional: She appears well-developed  No distress  HENT:   Head: Normocephalic  Mouth/Throat: No oropharyngeal exudate  Eyes: Conjunctivae are normal  No scleral icterus  Neck: Neck supple  No thyromegaly present  Cardiovascular: Normal rate and intact distal pulses  Exam reveals no friction rub  No murmur heard  Pulmonary/Chest: Effort normal  No respiratory distress  She has no wheezes  She has no rales  Abdominal: Soft  There is no tenderness  There is no guarding  Musculoskeletal: She exhibits no edema or deformity  Lymphadenopathy:     She has no cervical adenopathy  Neurological: She is alert  She displays normal reflexes  She exhibits normal muscle tone  Skin: Skin is warm and dry  No rash noted  No pallor  Psychiatric: Her behavior is normal  Thought content normal    Nursing note and vitals reviewed  Sumaya Corrales DO  Assessment and Plan:    Problem List Items Addressed This Visit     Benign essential hypertension - Primary    Relevant Orders    Comprehensive metabolic panel    BMI 77 5-44 6,IYLNJ    Hyperlipidemia LDL goal <130    Relevant Orders    Lipid Panel with Direct LDL reflex    Hypothyroidism    Relevant Orders    TSH, 3rd generation    Medicare annual wellness visit, subsequent    Obesity (BMI 30-39  9)        Health Maintenance Due   Topic Date Due    Medicare Annual Wellness Visit (AWV)  1945    CRC Screening: Colonoscopy  1945         HPI:  Giselle Herrera is a 71 y o  female here for her Subsequent Wellness Visit  Patient Active Problem List   Diagnosis    Arthropathy    Benign essential hypertension    Cataract    Cervical radiculopathy    GE reflux    Hyperlipidemia LDL goal <130    Hypothyroidism    IFG (impaired fasting glucose)    Menopause    Obesity (BMI 30-39  9)    Osteopenia    Myalgia    Breast cancer screening    BMI 31 0-31 9,adult    Medicare annual wellness visit, subsequent     Past Medical History:   Diagnosis Date    Arthropathy 10/15/2010    Disease of thyroid gland     Hyperlipidemia     Hypertension     Onychomycosis 03/04/2005     Past Surgical History:   Procedure Laterality Date    EYE SURGERY      bilateral IOL    FL OPEN TX RADIAL HEAD/NECK FRACTURE PROSTHETIC Right 12/5/2016    Procedure: OPEN REDUCTION W/ INTERNAL FIXATION (ORIF) ELBOW;  Surgeon: Julissa Azar MD;  Location[facility];  Service: Orthopedics     Family History   Problem Relation Age of Onset    Diabetes Sister      History   Smoking Status    Never Smoker   Smokeless Tobacco    Never Used     History   Alcohol Use No      History   Drug Use No       Current Outpatient Prescriptions   Medication Sig Dispense Refill    amLODIPine (NORVASC) 5 mg tablet Take 1 tablet (5 mg total) by mouth daily 90 tablet 1    atenolol (TENORMIN) 25 mg tablet Take 1 tablet (25 mg total) by mouth 2 (two) times a day 180 tablet 3    atorvastatin (LIPITOR) 20 mg tablet take 1 tablet by mouth once daily 90 tablet 1    levothyroxine 100 mcg tablet take 1 tablet by mouth once daily 90 tablet 3    losartan-hydrochlorothiazide (HYZAAR) 100-12 5 MG per tablet take 1 tablet by mouth once daily 90 tablet 1     No current facility-administered medications for this visit        Allergies   Allergen Reactions    Amoxicillin Itching and Other (See Comments)     Other reaction(s): tingling    Penicillins Rash     Immunization History   Administered Date(s) Administered    Influenza Split High Dose Preservative Free IM 09/20/2013, 10/15/2014, 10/20/2015, 10/04/2016, 09/22/2017    Influenza TIV (IM) 11/09/2005    Influenza, high dose seasonal 0 5 mL 09/20/2018    Pneumococcal Conjugate 13-Valent 12/14/2015    Pneumococcal Polysaccharide PPV23 10/17/2012    Tdap 08/21/2007, 11/27/2016    Zoster 10/17/2012       Patient Care Team:  Terri Trent DO as PCP - General    Medicare Screening Tests and Risk Assessments:  Alicia Parada is here for her Subsequent Wellness visit  Last Medicare Wellness visit information reviewed, patient interviewed and updates made to the record today  Health Risk Assessment:  Patient rates overall health as very good  Patient feels that their physical health rating is Same  Eyesight was rated as Same  Hearing was rated as Same  Patient feels that their emotional and mental health rating is Same  Pain experienced by patient in the last 7 days has been None  Patient states that she has experienced no weight loss or gain in last 6 months  Emotional/Mental Health:  Patient has been feeling nervous/anxious  PHQ-9 Depression Screening:    Frequency of the following problems over the past two weeks:      1  Little interest or pleasure in doing things: 0 - not at all      2  Feeling down, depressed, or hopeless: 0 - not at all  PHQ-2 Score: 0          Broken Bones/Falls: Fall Risk Assessment:    In the past year, patient has experienced: No history of falling in past year          Bladder/Bowel:  Patient has not leaked urine accidently in the last six months  Patient reports no loss of bowel control  Immunizations:  Patient has not had a flu vaccination within the last year  Patient has not received a pneumonia shot  Patient has not received a shingles shot  Patient has not received tetanus/diphtheria shot  Home Safety:  Patient does not have trouble with stairs inside or outside of their home     Patient currently reports that there are no safety hazards present in home, working smoke alarms, working carbon monoxide detectors  Preventative Screenings:   Breast cancer screening performed, no colon cancer screen completed, cholesterol screen completed, no glaucoma eye exam completed    Nutrition:  Current diet: Regular with servings of the following:    Medications:  Patient is currently taking over-the-counter supplements  List of OTC medications includes: advil, ca, D  Patient is able to manage medications  Lifestyle Choices:  Patient reports no tobacco use  Patient has not smoked or used tobacco in the past   Patient reports no alcohol use  Patient drives a vehicle  Patient wears seat belt  Current level of exercise of physical activity described by patient as: none  Activities of Daily Living:  Can get out of bed by his or her self, able to dress self, able to make own meals, able to do own shopping, able to bathe self, can do own laundry/housekeeping, can manage own money, pay bills and track expenses    Previous Hospitalizations:  No hospitalization or ED visit in past 12 months        Advanced Directives:  Patient has decided on a power of   Patient has spoken to designated power of   Patient has completed advanced directive          Preventative Screening/Counseling:      Cardiovascular:      General: Risks and Benefits Discussed          Diabetes:      General: Risks and Benefits Discussed          Colorectal Cancer:      General: Risks and Benefits Discussed          Breast Cancer:      General: Risks and Benefits Discussed          Cervical Cancer:      General: Risks and Benefits Discussed          Osteoporosis:      General: Risks and Benefits Discussed          AAA:      General: Screening Not Indicated          Glaucoma:      General: Screening Not Indicated          HIV:      General: Screening Not Indicated          Hepatitis C:      General: Screening Not Indicated        Advanced Directives:   Patient has living will for healthcare, No end of life assessment reviewed with patient

## 2019-01-02 NOTE — PROGRESS NOTES
Assessment  1  Tick bite, initial encounter (919 4,E906 4) (W57 XXXA)   2  Never a smoker   3  Benign essential hypertension (401 1) (I10)   4  BMI 30 0-30 9,adult (V85 30) (Z68 30)   5  Cataract (366 9) (H26 9)    Plan  BMI 30 0-30 9,adult    · Begin a limited exercise program ; Status:Complete;   Done: [id number]  Tick bite, initial encounter    · Doxycycline Hyclate 100 MG Oral Tablet; 2 tabs PO one time   · (1) LYME ANTIBODY PROFILE W/REFLEX TO WESTERN BLOT; Status:Active; Requested [id number];     Discussion/Summary    Preventative dose of Doxycycline ordered  Labs in 4-6 weeks  Localized wound care instructed  Follow up as needed for persistent or worsening symptoms  BP elevated today, however she did not take her medication yet this morning  in December as previously scheduled  Chief Complaint  C/O tick bite right waistline area which her daughter pulled off of her yesterday  The area is red and swollen JMoyle LPN      History of Present Illness  HPI: Her daughter pulled a deer tick off of her right lower abdomen yesterday  The surrounding tissue appears reddened  No previous histrory of Lyme disease  She has not applied anything topically to site  Review of Systems    Constitutional: No fever, no chills, feels well, no tiredness, no recent weight gain or loss  Musculoskeletal: no arthralgias  Integumentary: skin wound, but-- as noted in HPI  Active Problems  1  Alopecia (704 00) (L65 9)   2  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)   3  Benign essential hypertension (401 1) (I10)   4  BMI 30 0-30 9,adult (V85 30) (Z68 30)   5  Cataract (366 9) (H26 9)   6  Cervical radiculopathy (723 4) (M54 12)   7  Colon cancer screening (V76 51) (Z12 11)   8  Depression screening (V79 0) (Z13 89)   9  GE reflux (530 81) (K21 9)   10  Hyperlipidemia LDL goal <130 (272 4) (E78 5)   11  Hypothyroidism (244 9) (E03 9)   12  Immunization due (V05 9) (Z23)   13  Need for influenza vaccination (V04 81) (Z23)   14  Obesity (278 00) (E66 9)   15  Osteopenia (733 90) (M85 80)   16  Osteoporosis screening (V82 81) (Z13 820)   17  Other screening mammogram (V76 12) (Z12 31)   18  Pre-diabetes (790 29) (R73 03)   19  Screening for cardiovascular, respiratory, and genitourinary diseases    (V81 2,V81 4,V81 6) (Z13 6,Z13 83,Z13 89)   20  Screening for neurological condition (V80 09) (Z13 89)    Past Medical History  1  History of Acute upper respiratory infection (465 9) (J06 9)   2  History of Acute upper respiratory infection (465 9) (J06 9)   3  History of 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)   4  History of Backache (724 5) (M54 9)   5  History of Fracture of radial head, right, closed (813 05) (S52 121A)   6  History of abdominal pain (V13 89) (Z87 898)   7  History of acute bronchitis (V12 69) (Z87 09)   8  History of acute bronchitis (V12 69) (Z87 09)   9  History of backache (V13 59) (Z87 39)   10  History of low back pain (V13 59) (Z87 39)   11  History of onychomycosis (V12 09) (Z86 19)   12  History of pleurisy (V12 69) (Z87 09)   13  History of Joint pain, knee (719 46) (M25 569)   14  History of Late effect of sprain and strain (905 7)   15  History of Late Effects Of Sprain Or Strain (905 7)   16  Otitis media, unspecified laterality   17  Preop examination (V72 84) (Z01 818)   18  History of Screening for malignant neoplasm of breast (V76 10) (Z12 31)   19  History of Screening for malignant neoplasm of breast (V76 10) (Z12 31)   20  History of Sprained Ribs (848 3)   21  History of Tinea corporis (110 5) (B35 4)  Active Problems And Past Medical History Reviewed: The active problems and past medical history were reviewed and updated today  Family History  Sister    1  Family history of diabetes mellitus (V18 0) (Z83 3)    Social History   · Never a smoker  The social history was reviewed and is unchanged  Surgical History  1  History of Cataract Surgery    Current Meds   1   AmLODIPine Besylate 5 MG Oral Tablet; Take 1 tablet daily; Therapy: 65NUF7046 to (Evaluate:18Jun2018)  Requested for: 68WKT6211; Last   Rx:23Jun2017 Ordered   2  Atenolol 25 MG Oral Tablet; take 1 tablet by mouth once daily; Therapy: 95FYB3063 to (Evaluate:10Mar2018)  Requested for: 96SAF4398; Last   Rx:15Mar2017 Ordered   3  Atorvastatin Calcium 20 MG Oral Tablet; take 1 tablet by mouth once daily; Therapy: 96RXV7535 to (Evaluate:10Jun2018)  Requested for: 30ZOO3168; Last   Rx:15Jun2017 Ordered   4  Levothyroxine Sodium 100 MCG Oral Tablet; 1 Every Day,EVERTON;   Therapy: 52Qmh3462 to (Last Rx:96Ogw0006)  Requested for: 42EHH6861 Ordered   5  Losartan Potassium-HCTZ 100-12 5 MG Oral Tablet; take 1 tablet by mouth once daily; Therapy: 25Igr7768 to (Evaluate:05Zit8680)  Requested for: 02Oct2017; Last   Rx:02Oct2017 Ordered    The medication list was reviewed and updated today  Allergies  1  Amoxicillin TABS    Vitals   Recorded: 00CCX5790 08:48AM   Temperature 98 5 F   Heart Rate 60   Respiration 20   Systolic 919   Diastolic 90   Height 5 ft 2 in   Weight 169 lb    BMI Calculated 30 91   BSA Calculated 1 78     Physical Exam    Constitutional   General appearance: No acute distress, well appearing and well nourished  Pulmonary   Respiratory effort: No increased work of breathing or signs of respiratory distress  Auscultation of lungs: Clear to auscultation  Cardiovascular   Auscultation of heart: Normal rate and rhythm, normal S1 and S2, without murmurs  Examination of extremities for edema and/or varicosities: Normal     Skin right lower abdomen tick bite wound with surrounding erythema  No remnants of tick visualized  Psychiatric   Mood and affect: Normal          Attending Note  Collaborating Physician Note: Collaborating Note: I agree with the Advanced Practitioner note        Future Appointments    Date/Time Provider Specialty Site   12/15/2017 09:45 AM Milagro Oglesby[address] Signatures   Electronically signed by : Rayray Bejarano; Oct 19 2017  9:06AM EST                       (Author)    Electronically signed by : Deepali Lee DO; Oct 19 2017  9:15PM EST                       (Author) normal...

## 2019-01-08 DIAGNOSIS — I10 BENIGN ESSENTIAL HYPERTENSION: ICD-10-CM

## 2019-01-08 RX ORDER — AMLODIPINE BESYLATE 5 MG/1
TABLET ORAL
Qty: 90 TABLET | Refills: 1 | Status: SHIPPED | OUTPATIENT
Start: 2019-01-08 | End: 2019-07-08 | Stop reason: SDUPTHER

## 2019-03-12 DIAGNOSIS — I10 BENIGN ESSENTIAL HYPERTENSION: ICD-10-CM

## 2019-03-12 RX ORDER — LOSARTAN POTASSIUM AND HYDROCHLOROTHIAZIDE 12.5; 1 MG/1; MG/1
TABLET ORAL
Qty: 90 TABLET | Refills: 1 | Status: SHIPPED | OUTPATIENT
Start: 2019-03-12 | End: 2019-09-09 | Stop reason: SDUPTHER

## 2019-03-13 ENCOUNTER — OFFICE VISIT (OUTPATIENT)
Dept: FAMILY MEDICINE CLINIC | Facility: CLINIC | Age: 74
End: 2019-03-13
Payer: COMMERCIAL

## 2019-03-13 VITALS
TEMPERATURE: 98.2 F | WEIGHT: 171 LBS | HEIGHT: 62 IN | RESPIRATION RATE: 16 BRPM | DIASTOLIC BLOOD PRESSURE: 92 MMHG | HEART RATE: 62 BPM | BODY MASS INDEX: 31.47 KG/M2 | SYSTOLIC BLOOD PRESSURE: 140 MMHG

## 2019-03-13 DIAGNOSIS — I10 BENIGN ESSENTIAL HYPERTENSION: ICD-10-CM

## 2019-03-13 DIAGNOSIS — J06.9 UPPER RESPIRATORY TRACT INFECTION, UNSPECIFIED TYPE: Primary | ICD-10-CM

## 2019-03-13 PROCEDURE — 99213 OFFICE O/P EST LOW 20 MIN: CPT | Performed by: FAMILY MEDICINE

## 2019-03-13 PROCEDURE — 1160F RVW MEDS BY RX/DR IN RCRD: CPT | Performed by: FAMILY MEDICINE

## 2019-03-13 PROCEDURE — 1036F TOBACCO NON-USER: CPT | Performed by: FAMILY MEDICINE

## 2019-03-13 PROCEDURE — 3008F BODY MASS INDEX DOCD: CPT | Performed by: FAMILY MEDICINE

## 2019-03-13 RX ORDER — AZITHROMYCIN 250 MG/1
TABLET, FILM COATED ORAL
Qty: 6 TABLET | Refills: 0 | Status: SHIPPED | OUTPATIENT
Start: 2019-03-13 | End: 2019-03-18

## 2019-03-13 NOTE — PROGRESS NOTES
Assessment/Plan:    No problem-specific Assessment & Plan notes found for this encounter  Amanda Friedman   Could be improving  Saline gargle but abx in next 2d if no better  htn stable  Thyroid stable  F/u 2019 planned     Diagnoses and all orders for this visit:    Upper respiratory tract infection, unspecified type  -     azithromycin (ZITHROMAX) 250 mg tablet; Take 500mg on day 1, 250mg on days 2-5    Benign essential hypertension              Return for Next scheduled follow up  Subjective:      Patient ID: Frances Kirk is a 68 y o  female  Chief Complaint   Patient presents with    Sore Throat     with a little bit of pressure on the rt side x2 weeks  jmcma       HPI  2w  Sore throat  Was at a  R>L  Pressure in ear  No cough  Some mucus, post nasal drip  No f/c  No injury  No n/v/c/d      The following portions of the patient's history were reviewed and updated as appropriate: allergies, current medications, past family history, past medical history, past social history, past surgical history and problem list     Review of Systems   Respiratory: Negative for shortness of breath and wheezing  Cardiovascular: Negative for chest pain  Current Outpatient Medications   Medication Sig Dispense Refill    amLODIPine (NORVASC) 5 mg tablet take 1 tablet by mouth once daily 90 tablet 1    atenolol (TENORMIN) 25 mg tablet Take 1 tablet (25 mg total) by mouth 2 (two) times a day 180 tablet 3    atorvastatin (LIPITOR) 20 mg tablet take 1 tablet by mouth once daily 90 tablet 1    levothyroxine 100 mcg tablet take 1 tablet by mouth once daily 90 tablet 3    losartan-hydrochlorothiazide (HYZAAR) 100-12 5 MG per tablet take 1 tablet by mouth once daily 90 tablet 1    azithromycin (ZITHROMAX) 250 mg tablet Take 500mg on day 1, 250mg on days 2-5 6 tablet 0     No current facility-administered medications for this visit          Objective:    /92   Pulse 62   Temp 98 2 °F (36 8 °C)   Resp 16 Ht 5' 2" (1 575 m)   Wt 77 6 kg (171 lb)   BMI 31 28 kg/m²        Physical Exam   Constitutional: She appears well-developed  She does not appear ill  HENT:   Head: Normocephalic  Right Ear: Tympanic membrane and ear canal normal    Left Ear: Tympanic membrane normal    Mouth/Throat: No oropharyngeal exudate, posterior oropharyngeal edema or posterior oropharyngeal erythema  Small blood blister right canal   Eyes: Conjunctivae are normal    Neck: Neck supple  Cardiovascular: Normal rate, regular rhythm and intact distal pulses  No murmur heard  Pulmonary/Chest: Effort normal  No respiratory distress  Abdominal: Soft  There is no tenderness  Musculoskeletal: She exhibits no edema or deformity  Neurological: She is alert  Skin: Skin is warm and dry  Capillary refill takes less than 2 seconds  No pallor  Psychiatric: Her behavior is normal  Thought content normal    Nursing note and vitals reviewed               Enrico Perez,

## 2019-05-20 DIAGNOSIS — I10 BENIGN ESSENTIAL HYPERTENSION: ICD-10-CM

## 2019-05-20 RX ORDER — ATENOLOL 25 MG/1
TABLET ORAL
Qty: 180 TABLET | Refills: 1 | Status: SHIPPED | OUTPATIENT
Start: 2019-05-20 | End: 2020-05-11

## 2019-06-07 DIAGNOSIS — E78.5 HYPERLIPIDEMIA LDL GOAL <130: ICD-10-CM

## 2019-06-07 RX ORDER — ATORVASTATIN CALCIUM 20 MG/1
TABLET, FILM COATED ORAL
Qty: 90 TABLET | Refills: 1 | Status: SHIPPED | OUTPATIENT
Start: 2019-06-07 | End: 2019-12-06 | Stop reason: SDUPTHER

## 2019-06-12 LAB
ALBUMIN SERPL-MCNC: 3.9 G/DL (ref 3.5–4.8)
ALBUMIN/GLOB SERPL: 1.3 {RATIO} (ref 1.2–2.2)
ALP SERPL-CCNC: 66 IU/L (ref 39–117)
ALT SERPL-CCNC: 17 IU/L (ref 0–32)
AST SERPL-CCNC: 19 IU/L (ref 0–40)
BILIRUB SERPL-MCNC: <0.2 MG/DL (ref 0–1.2)
BUN SERPL-MCNC: 12 MG/DL (ref 8–27)
BUN/CREAT SERPL: 15 (ref 12–28)
CALCIUM SERPL-MCNC: 9.1 MG/DL (ref 8.7–10.3)
CHLORIDE SERPL-SCNC: 102 MMOL/L (ref 96–106)
CHOLEST SERPL-MCNC: 132 MG/DL (ref 100–199)
CO2 SERPL-SCNC: 23 MMOL/L (ref 20–29)
CREAT SERPL-MCNC: 0.79 MG/DL (ref 0.57–1)
GLOBULIN SER-MCNC: 2.9 G/DL (ref 1.5–4.5)
GLUCOSE SERPL-MCNC: 98 MG/DL (ref 65–99)
HDLC SERPL-MCNC: 44 MG/DL
LABCORP COMMENT: NORMAL
LDLC SERPL CALC-MCNC: 71 MG/DL (ref 0–99)
MICRODELETION SYND BLD/T FISH: NORMAL
POTASSIUM SERPL-SCNC: 4.8 MMOL/L (ref 3.5–5.2)
PROT SERPL-MCNC: 6.8 G/DL (ref 6–8.5)
SL AMB EGFR AFRICAN AMERICAN: 86 ML/MIN/1.73
SL AMB EGFR NON AFRICAN AMERICAN: 75 ML/MIN/1.73
SODIUM SERPL-SCNC: 141 MMOL/L (ref 134–144)
TRIGL SERPL-MCNC: 83 MG/DL (ref 0–149)
TSH SERPL DL<=0.005 MIU/L-ACNC: 1.2 UIU/ML (ref 0.45–4.5)

## 2019-06-25 ENCOUNTER — OFFICE VISIT (OUTPATIENT)
Dept: FAMILY MEDICINE CLINIC | Facility: CLINIC | Age: 74
End: 2019-06-25
Payer: COMMERCIAL

## 2019-06-25 VITALS
DIASTOLIC BLOOD PRESSURE: 88 MMHG | HEIGHT: 62 IN | WEIGHT: 169 LBS | BODY MASS INDEX: 31.1 KG/M2 | TEMPERATURE: 98.6 F | RESPIRATION RATE: 16 BRPM | SYSTOLIC BLOOD PRESSURE: 138 MMHG | HEART RATE: 56 BPM

## 2019-06-25 DIAGNOSIS — Z00.00 MEDICARE ANNUAL WELLNESS VISIT, SUBSEQUENT: ICD-10-CM

## 2019-06-25 DIAGNOSIS — E03.9 HYPOTHYROIDISM, UNSPECIFIED TYPE: ICD-10-CM

## 2019-06-25 DIAGNOSIS — I10 BENIGN ESSENTIAL HYPERTENSION: Primary | ICD-10-CM

## 2019-06-25 DIAGNOSIS — Z12.11 COLON CANCER SCREENING: ICD-10-CM

## 2019-06-25 DIAGNOSIS — Z12.39 BREAST CANCER SCREENING: ICD-10-CM

## 2019-06-25 DIAGNOSIS — E78.5 HYPERLIPIDEMIA LDL GOAL <130: ICD-10-CM

## 2019-06-25 PROBLEM — J06.9 UPPER RESPIRATORY TRACT INFECTION: Status: RESOLVED | Noted: 2019-03-13 | Resolved: 2019-06-25

## 2019-06-25 PROCEDURE — 1160F RVW MEDS BY RX/DR IN RCRD: CPT | Performed by: FAMILY MEDICINE

## 2019-06-25 PROCEDURE — 1101F PT FALLS ASSESS-DOCD LE1/YR: CPT | Performed by: FAMILY MEDICINE

## 2019-06-25 PROCEDURE — 3008F BODY MASS INDEX DOCD: CPT | Performed by: FAMILY MEDICINE

## 2019-06-25 PROCEDURE — 3079F DIAST BP 80-89 MM HG: CPT | Performed by: FAMILY MEDICINE

## 2019-06-25 PROCEDURE — 99214 OFFICE O/P EST MOD 30 MIN: CPT | Performed by: FAMILY MEDICINE

## 2019-06-25 PROCEDURE — 3075F SYST BP GE 130 - 139MM HG: CPT | Performed by: FAMILY MEDICINE

## 2019-06-25 PROCEDURE — 3725F SCREEN DEPRESSION PERFORMED: CPT | Performed by: FAMILY MEDICINE

## 2019-06-25 PROCEDURE — 1036F TOBACCO NON-USER: CPT | Performed by: FAMILY MEDICINE

## 2019-07-08 DIAGNOSIS — I10 BENIGN ESSENTIAL HYPERTENSION: ICD-10-CM

## 2019-07-08 RX ORDER — AMLODIPINE BESYLATE 5 MG/1
TABLET ORAL
Qty: 90 TABLET | Refills: 1 | Status: SHIPPED | OUTPATIENT
Start: 2019-07-08 | End: 2019-12-30 | Stop reason: SDUPTHER

## 2019-08-02 ENCOUNTER — OFFICE VISIT (OUTPATIENT)
Dept: FAMILY MEDICINE CLINIC | Facility: CLINIC | Age: 74
End: 2019-08-02
Payer: COMMERCIAL

## 2019-08-02 VITALS
SYSTOLIC BLOOD PRESSURE: 132 MMHG | TEMPERATURE: 98.3 F | HEIGHT: 62 IN | WEIGHT: 168.2 LBS | HEART RATE: 68 BPM | RESPIRATION RATE: 18 BRPM | DIASTOLIC BLOOD PRESSURE: 84 MMHG | BODY MASS INDEX: 30.95 KG/M2

## 2019-08-02 DIAGNOSIS — M79.10 MYALGIA: ICD-10-CM

## 2019-08-02 DIAGNOSIS — I10 BENIGN ESSENTIAL HYPERTENSION: ICD-10-CM

## 2019-08-02 DIAGNOSIS — E03.9 HYPOTHYROIDISM, UNSPECIFIED TYPE: ICD-10-CM

## 2019-08-02 DIAGNOSIS — M12.9 ARTHROPATHY: ICD-10-CM

## 2019-08-02 DIAGNOSIS — S39.012A STRAIN OF LUMBAR REGION, INITIAL ENCOUNTER: Primary | ICD-10-CM

## 2019-08-02 PROCEDURE — 1036F TOBACCO NON-USER: CPT | Performed by: FAMILY MEDICINE

## 2019-08-02 PROCEDURE — 3008F BODY MASS INDEX DOCD: CPT | Performed by: FAMILY MEDICINE

## 2019-08-02 PROCEDURE — 99214 OFFICE O/P EST MOD 30 MIN: CPT | Performed by: FAMILY MEDICINE

## 2019-08-02 PROCEDURE — 3075F SYST BP GE 130 - 139MM HG: CPT | Performed by: FAMILY MEDICINE

## 2019-08-02 PROCEDURE — 1160F RVW MEDS BY RX/DR IN RCRD: CPT | Performed by: FAMILY MEDICINE

## 2019-08-02 RX ORDER — CYCLOBENZAPRINE HCL 10 MG
10 TABLET ORAL
Qty: 60 TABLET | Refills: 1 | Status: SHIPPED | OUTPATIENT
Start: 2019-08-02 | End: 2019-12-06

## 2019-08-02 NOTE — PROGRESS NOTES
Assessment/Plan:    No problem-specific Assessment & Plan notes found for this encounter  PT if needed, can call for order  rom and hep suggested  Short term use flexeril, fall risks aware    Hyypothyroid/htn/cholesterol stable     Diagnoses and all orders for this visit:    Strain of lumbar region, initial encounter  -     cyclobenzaprine (FLEXERIL) 10 mg tablet; Take 1 tablet (10 mg total) by mouth daily at bedtime as needed for muscle spasms (caution with falls)    Benign essential hypertension    Hypothyroidism, unspecified type    Arthropathy    Myalgia  -     cyclobenzaprine (FLEXERIL) 10 mg tablet; Take 1 tablet (10 mg total) by mouth daily at bedtime as needed for muscle spasms (caution with falls)        Return if symptoms worsen or fail to improve  Subjective:      Patient ID: Tameka Canseco is a 68 y o  female  Chief Complaint   Patient presents with    Back Pain     lower back  bchurch lpn       HPI  Back pain  Lifting grandchild  About 2w  Left lower  Tight  Not sharp  w with rotation  Ok flex/sb  Paige rotating to right  Used advil and heat  Better slightly slowly  Usually triggered by excess physical exertion  No incontinence    The following portions of the patient's history were reviewed and updated as appropriate: allergies, current medications, past family history, past medical history, past social history, past surgical history and problem list     Review of Systems   Constitutional: Negative for fever  Respiratory: Negative for shortness of breath  Genitourinary: Negative for difficulty urinating  Musculoskeletal: Positive for back pain and myalgias  Negative for gait problem           Current Outpatient Medications   Medication Sig Dispense Refill    amLODIPine (NORVASC) 5 mg tablet take 1 tablet by mouth once daily 90 tablet 1    atenolol (TENORMIN) 25 mg tablet take 1 tablet by mouth once daily 180 tablet 1    atorvastatin (LIPITOR) 20 mg tablet take 1 tablet by mouth once daily 90 tablet 1    levothyroxine 100 mcg tablet take 1 tablet by mouth once daily 90 tablet 3    losartan-hydrochlorothiazide (HYZAAR) 100-12 5 MG per tablet take 1 tablet by mouth once daily 90 tablet 1    cyclobenzaprine (FLEXERIL) 10 mg tablet Take 1 tablet (10 mg total) by mouth daily at bedtime as needed for muscle spasms (caution with falls) 60 tablet 1     No current facility-administered medications for this visit  Objective:    /84   Pulse 68   Temp 98 3 °F (36 8 °C)   Resp 18   Ht 5' 2" (1 575 m)   Wt 76 3 kg (168 lb 3 2 oz)   BMI 30 76 kg/m²        Physical Exam   Constitutional: She appears well-developed  No distress  HENT:   Head: Normocephalic  Right Ear: External ear normal    Left Ear: External ear normal    Mouth/Throat: Oropharynx is clear and moist  No oropharyngeal exudate  Eyes: Conjunctivae are normal  No scleral icterus  Neck: Neck supple  Cardiovascular: Normal rate, normal heart sounds and intact distal pulses  Exam reveals no friction rub  No murmur heard  Pulmonary/Chest: Effort normal  No stridor  No respiratory distress  She has no wheezes  Abdominal: Soft  Bowel sounds are normal  She exhibits no distension  Musculoskeletal: She exhibits tenderness  She exhibits no edema or deformity  SLR neg b/l, left paralumbar tenderness brittni at [facility]   Neurological: She is alert  She displays normal reflexes  She exhibits normal muscle tone  Skin: Skin is warm and dry  No rash noted  Psychiatric: Her behavior is normal  Thought content normal    Nursing note and vitals reviewed               Frieda Quintero DO

## 2019-09-09 DIAGNOSIS — I10 BENIGN ESSENTIAL HYPERTENSION: ICD-10-CM

## 2019-09-09 RX ORDER — LOSARTAN POTASSIUM AND HYDROCHLOROTHIAZIDE 12.5; 1 MG/1; MG/1
TABLET ORAL
Qty: 90 TABLET | Refills: 1 | Status: SHIPPED | OUTPATIENT
Start: 2019-09-09 | End: 2019-12-06

## 2019-10-11 ENCOUNTER — IMMUNIZATIONS (OUTPATIENT)
Dept: FAMILY MEDICINE CLINIC | Facility: CLINIC | Age: 74
End: 2019-10-11
Payer: COMMERCIAL

## 2019-10-11 DIAGNOSIS — Z23 NEED FOR VACCINATION: Primary | ICD-10-CM

## 2019-10-11 PROCEDURE — 90662 IIV NO PRSV INCREASED AG IM: CPT

## 2019-10-11 PROCEDURE — G0008 ADMIN INFLUENZA VIRUS VAC: HCPCS

## 2019-11-04 DIAGNOSIS — E03.9 HYPOTHYROIDISM, UNSPECIFIED TYPE: ICD-10-CM

## 2019-11-04 RX ORDER — LEVOTHYROXINE SODIUM 0.1 MG/1
TABLET ORAL
Qty: 90 TABLET | Refills: 1 | Status: SHIPPED | OUTPATIENT
Start: 2019-11-04 | End: 2020-04-27 | Stop reason: SDUPTHER

## 2019-12-06 ENCOUNTER — OFFICE VISIT (OUTPATIENT)
Dept: FAMILY MEDICINE CLINIC | Facility: CLINIC | Age: 74
End: 2019-12-06
Payer: COMMERCIAL

## 2019-12-06 VITALS
WEIGHT: 169 LBS | TEMPERATURE: 98.2 F | RESPIRATION RATE: 22 BRPM | SYSTOLIC BLOOD PRESSURE: 110 MMHG | HEART RATE: 72 BPM | DIASTOLIC BLOOD PRESSURE: 70 MMHG | OXYGEN SATURATION: 98 % | BODY MASS INDEX: 31.1 KG/M2 | HEIGHT: 62 IN

## 2019-12-06 DIAGNOSIS — J01.00 ACUTE NON-RECURRENT MAXILLARY SINUSITIS: ICD-10-CM

## 2019-12-06 DIAGNOSIS — I10 BENIGN ESSENTIAL HYPERTENSION: Primary | ICD-10-CM

## 2019-12-06 DIAGNOSIS — E03.9 HYPOTHYROIDISM, UNSPECIFIED TYPE: ICD-10-CM

## 2019-12-06 DIAGNOSIS — E78.5 HYPERLIPIDEMIA LDL GOAL <130: ICD-10-CM

## 2019-12-06 PROCEDURE — 1160F RVW MEDS BY RX/DR IN RCRD: CPT | Performed by: FAMILY MEDICINE

## 2019-12-06 PROCEDURE — 3078F DIAST BP <80 MM HG: CPT | Performed by: FAMILY MEDICINE

## 2019-12-06 PROCEDURE — 99213 OFFICE O/P EST LOW 20 MIN: CPT | Performed by: FAMILY MEDICINE

## 2019-12-06 PROCEDURE — 3008F BODY MASS INDEX DOCD: CPT | Performed by: FAMILY MEDICINE

## 2019-12-06 PROCEDURE — 3074F SYST BP LT 130 MM HG: CPT | Performed by: FAMILY MEDICINE

## 2019-12-06 RX ORDER — HYDROCHLOROTHIAZIDE 12.5 MG/1
12.5 TABLET ORAL DAILY
Qty: 90 TABLET | Refills: 1 | Status: SHIPPED | OUTPATIENT
Start: 2019-12-06 | End: 2020-06-01 | Stop reason: SDUPTHER

## 2019-12-06 RX ORDER — ATORVASTATIN CALCIUM 20 MG/1
20 TABLET, FILM COATED ORAL DAILY
Qty: 90 TABLET | Refills: 1 | Status: SHIPPED | OUTPATIENT
Start: 2019-12-06 | End: 2020-06-01 | Stop reason: SDUPTHER

## 2019-12-06 RX ORDER — LOSARTAN POTASSIUM 100 MG/1
100 TABLET ORAL DAILY
Qty: 90 TABLET | Refills: 1 | Status: SHIPPED | OUTPATIENT
Start: 2019-12-06 | End: 2020-06-01 | Stop reason: SDUPTHER

## 2019-12-06 RX ORDER — AZITHROMYCIN 250 MG/1
TABLET, FILM COATED ORAL
Qty: 6 TABLET | Refills: 0 | Status: SHIPPED | OUTPATIENT
Start: 2019-12-06 | End: 2019-12-11

## 2019-12-06 NOTE — PROGRESS NOTES
Assessment/Plan:    No problem-specific Assessment & Plan notes found for this encounter  If no better in 5d then xr and call  cxr order given  htn stable  Thyroid stable  mucinex DM     Diagnoses and all orders for this visit:    Benign essential hypertension    Hypothyroidism, unspecified type    Acute non-recurrent maxillary sinusitis  -     azithromycin (ZITHROMAX) 250 mg tablet; Take 500mg on day 1, 250mg on days 2-5  -     XR chest pa & lateral; Future    Hyperlipidemia LDL goal <130  -     atorvastatin (LIPITOR) 20 mg tablet; Take 1 tablet (20 mg total) by mouth daily        Return if symptoms worsen or fail to improve  Subjective:      Patient ID: Ector Rios is a 68 y o  female  Chief Complaint   Patient presents with    Cough     symptoms for the past few days  rmklpn    head and chest congestion    sinus drainage       HPI  Cough  Congestion  Sinus drip  About 1w  Sore throat  Mucus worse in chest  Chest congestion  Paige in am, yellow/green  No fever  No sob  No aches  Some robitussin DM  Cough at night      The following portions of the patient's history were reviewed and updated as appropriate: allergies, current medications, past family history, past medical history, past social history, past surgical history and problem list     Review of Systems   Respiratory: Positive for cough  Negative for wheezing            Current Outpatient Medications   Medication Sig Dispense Refill    amLODIPine (NORVASC) 5 mg tablet take 1 tablet by mouth once daily 90 tablet 1    atenolol (TENORMIN) 25 mg tablet take 1 tablet by mouth once daily 180 tablet 1    atorvastatin (LIPITOR) 20 mg tablet Take 1 tablet (20 mg total) by mouth daily 90 tablet 1    levothyroxine 100 mcg tablet take 1 tablet by mouth once daily 90 tablet 1    losartan-hydrochlorothiazide (HYZAAR) 100-12 5 MG per tablet take 1 tablet by mouth once daily 90 tablet 1    azithromycin (ZITHROMAX) 250 mg tablet Take 500mg on day 1, 250mg on days 2-5 6 tablet 0     No current facility-administered medications for this visit  Objective:    /70   Pulse 72   Temp 98 2 °F (36 8 °C)   Resp 22   Ht 5' 2" (1 575 m)   Wt 76 7 kg (169 lb)   SpO2 98%   BMI 30 91 kg/m²        Physical Exam   Constitutional: She appears well-developed  HENT:   Head: Normocephalic  Right Ear: External ear normal    Left Ear: External ear normal    B/l red nasal turbinates   Eyes: Conjunctivae are normal  No scleral icterus  Neck: Neck supple  Cardiovascular: Normal rate, regular rhythm and intact distal pulses  Pulmonary/Chest: Effort normal  No respiratory distress  She has no wheezes  Coarse midline cough present  Abdominal: Soft  Musculoskeletal: She exhibits no edema or deformity  Neurological: She is alert  She exhibits normal muscle tone  Skin: Skin is warm and dry  No pallor  Psychiatric: Her behavior is normal  Thought content normal    Nursing note and vitals reviewed               Crystal Reyes DO

## 2019-12-09 ENCOUNTER — HOSPITAL ENCOUNTER (OUTPATIENT)
Dept: RADIOLOGY | Facility: HOSPITAL | Age: 74
Discharge: HOME | End: 2019-12-09
Attending: FAMILY MEDICINE
Payer: COMMERCIAL

## 2019-12-09 VITALS — HEIGHT: 62 IN | BODY MASS INDEX: 31.1 KG/M2 | WEIGHT: 169 LBS

## 2019-12-09 DIAGNOSIS — Z12.39 BREAST CANCER SCREENING: ICD-10-CM

## 2019-12-09 PROCEDURE — 77067 SCR MAMMO BI INCL CAD: CPT

## 2019-12-12 ENCOUNTER — TRANSCRIBE ORDERS (OUTPATIENT)
Dept: ADMINISTRATIVE | Facility: HOSPITAL | Age: 74
End: 2019-12-12

## 2019-12-12 ENCOUNTER — HOSPITAL ENCOUNTER (OUTPATIENT)
Dept: RADIOLOGY | Facility: HOSPITAL | Age: 74
Discharge: HOME | End: 2019-12-12
Attending: FAMILY MEDICINE
Payer: COMMERCIAL

## 2019-12-12 DIAGNOSIS — J01.00 ACUTE MAXILLARY SINUSITIS, RECURRENCE NOT SPECIFIED: Primary | ICD-10-CM

## 2019-12-12 DIAGNOSIS — J01.00 ACUTE MAXILLARY SINUSITIS, RECURRENCE NOT SPECIFIED: ICD-10-CM

## 2019-12-12 PROCEDURE — 71046 X-RAY EXAM CHEST 2 VIEWS: CPT

## 2019-12-13 ENCOUNTER — TELEPHONE (OUTPATIENT)
Dept: FAMILY MEDICINE CLINIC | Facility: CLINIC | Age: 74
End: 2019-12-13

## 2019-12-13 DIAGNOSIS — J01.00 ACUTE NON-RECURRENT MAXILLARY SINUSITIS: Primary | ICD-10-CM

## 2019-12-13 RX ORDER — DOXYCYCLINE 100 MG/1
100 CAPSULE ORAL 2 TIMES DAILY
Qty: 20 CAPSULE | Refills: 0 | Status: SHIPPED | OUTPATIENT
Start: 2019-12-13 | End: 2019-12-23

## 2019-12-13 NOTE — TELEPHONE ENCOUNTER
Saw Dr Brisa Jaeger for congestion  She is still feeling sick  Yellow/green phlegm  Xray normal   She was told to call and let Dr Brisa Jaeger how she is feeling and what she can do?

## 2019-12-14 NOTE — TELEPHONE ENCOUNTER
Priyanka Chapman called back and is aware her CXR is normal  No further action is required [facility]

## 2019-12-25 LAB
ALBUMIN SERPL-MCNC: 4.1 G/DL (ref 3.5–4.8)
ALBUMIN/GLOB SERPL: 1.5 {RATIO} (ref 1.2–2.2)
ALP SERPL-CCNC: 81 IU/L (ref 39–117)
ALT SERPL-CCNC: 15 IU/L (ref 0–32)
AST SERPL-CCNC: 22 IU/L (ref 0–40)
BILIRUB SERPL-MCNC: 0.4 MG/DL (ref 0–1.2)
BUN SERPL-MCNC: 14 MG/DL (ref 8–27)
BUN/CREAT SERPL: 17 (ref 12–28)
CALCIUM SERPL-MCNC: 9.6 MG/DL (ref 8.7–10.3)
CHLORIDE SERPL-SCNC: 100 MMOL/L (ref 96–106)
CO2 SERPL-SCNC: 24 MMOL/L (ref 20–29)
CREAT SERPL-MCNC: 0.81 MG/DL (ref 0.57–1)
GLOBULIN SER-MCNC: 2.8 G/DL (ref 1.5–4.5)
GLUCOSE SERPL-MCNC: 104 MG/DL (ref 65–99)
POTASSIUM SERPL-SCNC: 4.6 MMOL/L (ref 3.5–5.2)
PROT SERPL-MCNC: 6.9 G/DL (ref 6–8.5)
SL AMB EGFR AFRICAN AMERICAN: 84 ML/MIN/1.73
SL AMB EGFR NON AFRICAN AMERICAN: 73 ML/MIN/1.73
SODIUM SERPL-SCNC: 139 MMOL/L (ref 134–144)
TSH SERPL DL<=0.005 MIU/L-ACNC: 0.93 UIU/ML (ref 0.45–4.5)

## 2019-12-30 ENCOUNTER — OFFICE VISIT (OUTPATIENT)
Dept: FAMILY MEDICINE CLINIC | Facility: CLINIC | Age: 74
End: 2019-12-30
Payer: COMMERCIAL

## 2019-12-30 VITALS
HEIGHT: 62 IN | HEART RATE: 60 BPM | WEIGHT: 169 LBS | TEMPERATURE: 98 F | DIASTOLIC BLOOD PRESSURE: 80 MMHG | SYSTOLIC BLOOD PRESSURE: 122 MMHG | BODY MASS INDEX: 31.1 KG/M2 | RESPIRATION RATE: 16 BRPM | OXYGEN SATURATION: 94 %

## 2019-12-30 DIAGNOSIS — E78.5 HYPERLIPIDEMIA LDL GOAL <130: ICD-10-CM

## 2019-12-30 DIAGNOSIS — I10 BENIGN ESSENTIAL HYPERTENSION: ICD-10-CM

## 2019-12-30 DIAGNOSIS — Z00.00 MEDICARE ANNUAL WELLNESS VISIT, SUBSEQUENT: Primary | ICD-10-CM

## 2019-12-30 DIAGNOSIS — E66.9 OBESITY (BMI 30-39.9): ICD-10-CM

## 2019-12-30 DIAGNOSIS — E03.9 HYPOTHYROIDISM, UNSPECIFIED TYPE: ICD-10-CM

## 2019-12-30 PROBLEM — J01.00 ACUTE NON-RECURRENT MAXILLARY SINUSITIS: Status: RESOLVED | Noted: 2019-12-06 | Resolved: 2019-12-30

## 2019-12-30 PROBLEM — M79.10 MYALGIA: Status: RESOLVED | Noted: 2018-04-13 | Resolved: 2019-12-30

## 2019-12-30 PROCEDURE — 3074F SYST BP LT 130 MM HG: CPT | Performed by: FAMILY MEDICINE

## 2019-12-30 PROCEDURE — 1160F RVW MEDS BY RX/DR IN RCRD: CPT | Performed by: FAMILY MEDICINE

## 2019-12-30 PROCEDURE — 3008F BODY MASS INDEX DOCD: CPT | Performed by: FAMILY MEDICINE

## 2019-12-30 PROCEDURE — 1125F AMNT PAIN NOTED PAIN PRSNT: CPT | Performed by: FAMILY MEDICINE

## 2019-12-30 PROCEDURE — G0439 PPPS, SUBSEQ VISIT: HCPCS | Performed by: FAMILY MEDICINE

## 2019-12-30 PROCEDURE — 1170F FXNL STATUS ASSESSED: CPT | Performed by: FAMILY MEDICINE

## 2019-12-30 PROCEDURE — 1036F TOBACCO NON-USER: CPT | Performed by: FAMILY MEDICINE

## 2019-12-30 PROCEDURE — 3725F SCREEN DEPRESSION PERFORMED: CPT | Performed by: FAMILY MEDICINE

## 2019-12-30 PROCEDURE — 3079F DIAST BP 80-89 MM HG: CPT | Performed by: FAMILY MEDICINE

## 2019-12-30 PROCEDURE — 99214 OFFICE O/P EST MOD 30 MIN: CPT | Performed by: FAMILY MEDICINE

## 2019-12-30 RX ORDER — AMLODIPINE BESYLATE 5 MG/1
5 TABLET ORAL DAILY
Qty: 90 TABLET | Refills: 1 | Status: SHIPPED | OUTPATIENT
Start: 2019-12-30 | End: 2020-06-26 | Stop reason: SDUPTHER

## 2019-12-30 NOTE — PATIENT INSTRUCTIONS
A Medicare Annual Wellness Visit and questionnaire was performed today  The visit included a review of your health habits, risk factors and age/risk appropriate screening tests/immunizations that you may be due for  This visit is not a substitute for follow-up visits for chronic medical conditions or evaluation of acute problems  Since the Annual Wellness health review is covered only once every 365 days, your next one should not occur before then  Medicare Preventive Visit Patient Instructions  Thank you for completing your Welcome to Medicare Visit or Medicare Annual Wellness Visit today  Your next wellness visit will be due in one year (12/30/2020)  The screening/preventive services that you may require over the next 5-10 years are detailed below  Some tests may not apply to you based off risk factors and/or age  Screening tests ordered at today's visit but not completed yet may show as past due  Also, please note that scanned in results may not display below  Preventive Screenings:  Service Recommendations Previous Testing/Comments   Colorectal Cancer Screening  * Colonoscopy    * Fecal Occult Blood Test (FOBT)/Fecal Immunochemical Test (FIT)  * Fecal DNA/Cologuard Test  * Flexible Sigmoidoscopy Age: 54-65 years old   Colonoscopy: every 10 years (may be performed more frequently if at higher risk)  OR  FOBT/FIT: every 1 year  OR  Cologuard: every 3 years  OR  Sigmoidoscopy: every 5 years  Screening may be recommended earlier than age 48 if at higher risk for colorectal cancer  Also, an individualized decision between you and your healthcare provider will decide whether screening between the ages of 74-80 would be appropriate   Colonoscopy: Not on file  FOBT/FIT: 12/17/2018  Cologuard: 07/08/2019  Sigmoidoscopy: Not on file    Screening Current     Breast Cancer Screening Age: 36 years old  Frequency: every 1-2 years  Not required if history of left and right mastectomy Mammogram: 12/09/2019    Screening Current   Cervical Cancer Screening Between the ages of 18-28, pap smear recommended once every 3 years  Between the ages of 33-67, can perform pap smear with HPV co-testing every 5 years  Recommendations may differ for women with a history of total hysterectomy, cervical cancer, or abnormal pap smears in past  Pap Smear: Not on file    Screening Not Indicated   Hepatitis C Screening Once for adults born between 1945 and 1965  More frequently in patients at high risk for Hepatitis C Hep C Antibody: 12/03/2018    Screening Current   Diabetes Screening 1-2 times per year if you're at risk for diabetes or have pre-diabetes Fasting glucose: No results in last 5 years   A1C: 5 7 %    Screening Current   Cholesterol Screening Once every 5 years if you don't have a lipid disorder  May order more often based on risk factors  Lipid panel: 06/11/2019    Screening Not Indicated  History Lipid Disorder     Other Preventive Screenings Covered by Medicare:  1  Abdominal Aortic Aneurysm (AAA) Screening: covered once if your at risk  You're considered to be at risk if you have a family history of AAA  2  Lung Cancer Screening: covers low dose CT scan once per year if you meet all of the following conditions: (1) Age 50-69; (2) No signs or symptoms of lung cancer; (3) Current smoker or have quit smoking within the last 15 years; (4) You have a tobacco smoking history of at least 30 pack years (packs per day multiplied by number of years you smoked); (5) You get a written order from a healthcare provider  3  Glaucoma Screening: covered annually if you're considered high risk: (1) You have diabetes OR (2) Family history of glaucoma OR (3)  aged 48 and older OR (3)  American aged 72 and older  3   Osteoporosis Screening: covered every 2 years if you meet one of the following conditions: (1) You're estrogen deficient and at risk for osteoporosis based off medical history and other findings; (2) Have a vertebral abnormality; (3) On glucocorticoid therapy for more than 3 months; (4) Have primary hyperparathyroidism; (5) On osteoporosis medications and need to assess response to drug therapy  · Last bone density test (DXA Scan): 12/06/2018  5  HIV Screening: covered annually if you're between the age of 12-76  Also covered annually if you are younger than 13 and older than 72 with risk factors for HIV infection  For pregnant patients, it is covered up to 3 times per pregnancy  Immunizations:  Immunization Recommendations   Influenza Vaccine Annual influenza vaccination during flu season is recommended for all persons aged >= 6 months who do not have contraindications   Pneumococcal Vaccine (Prevnar and Pneumovax)  * Prevnar = PCV13  * Pneumovax = PPSV23   Adults 25-60 years old: 1-3 doses may be recommended based on certain risk factors  Adults 72 years old: Prevnar (PCV13) vaccine recommended followed by Pneumovax (PPSV23) vaccine  If already received PPSV23 since turning 65, then PCV13 recommended at least one year after PPSV23 dose  Hepatitis B Vaccine 3 dose series if at intermediate or high risk (ex: diabetes, end stage renal disease, liver disease)   Tetanus (Td) Vaccine - COST NOT COVERED BY MEDICARE PART B Following completion of primary series, a booster dose should be given every 10 years to maintain immunity against tetanus  Td may also be given as tetanus wound prophylaxis  Tdap Vaccine - COST NOT COVERED BY MEDICARE PART B Recommended at least once for all adults  For pregnant patients, recommended with each pregnancy     Shingles Vaccine (Shingrix) - COST NOT COVERED BY MEDICARE PART B  2 shot series recommended in those aged 48 and above     Health Maintenance Due:      Topic Date Due    CRC Screening: Colonoscopy  1945    CRC Screening: FOBTx3/FIT  12/17/2019    MAMMOGRAM  12/09/2020    CRC Screening: Cologuard  07/08/2022    Hepatitis C Screening  Completed     Immunizations Due:  There are no preventive care reminders to display for this patient  Advance Directives   What are advance directives? Advance directives are legal documents that state your wishes and plans for medical care  These plans are made ahead of time in case you lose your ability to make decisions for yourself  Advance directives can apply to any medical decision, such as the treatments you want, and if you want to donate organs  What are the types of advance directives? There are many types of advance directives, and each state has rules about how to use them  You may choose a combination of any of the following:  · Living will: This is a written record of the treatment you want  You can also choose which treatments you do not want, which to limit, and which to stop at a certain time  This includes surgery, medicine, IV fluid, and tube feedings  · Durable power of  for healthcare [facility]): This is a written record that states who you want to make healthcare choices for you when you are unable to make them for yourself  This person, called a proxy, is usually a family member or a friend  You may choose more than 1 proxy  · Do not resuscitate (DNR) order:  A DNR order is used in case your heart stops beating or you stop breathing  It is a request not to have certain forms of treatment, such as CPR  A DNR order may be included in other types of advance directives  · Medical directive: This covers the care that you want if you are in a coma, near death, or unable to make decisions for yourself  You can list the treatments you want for each condition  Treatment may include pain medicine, surgery, blood transfusions, dialysis, IV or tube feedings, and a ventilator (breathing machine)  · Values history: This document has questions about your views, beliefs, and how you feel and think about life  This information can help others choose the care that you would choose    Why are advance directives important? An advance directive helps you control your care  Although spoken wishes may be used, it is better to have your wishes written down  Spoken wishes can be misunderstood, or not followed  Treatments may be given even if you do not want them  An advance directive may make it easier for your family to make difficult choices about your care  Weight Management   Why it is important to manage your weight:  Being overweight increases your risk of health conditions such as heart disease, high blood pressure, type 2 diabetes, and certain types of cancer  It can also increase your risk for osteoarthritis, sleep apnea, and other respiratory problems  Aim for a slow, steady weight loss  Even a small amount of weight loss can lower your risk of health problems  How to lose weight safely:  A safe and healthy way to lose weight is to eat fewer calories and get regular exercise  You can lose up about 1 pound a week by decreasing the number of calories you eat by 500 calories each day  Healthy meal plan for weight management:  A healthy meal plan includes a variety of foods, contains fewer calories, and helps you stay healthy  A healthy meal plan includes the following:  · Eat whole-grain foods more often  A healthy meal plan should contain fiber  Fiber is the part of grains, fruits, and vegetables that is not broken down by your body  Whole-grain foods are healthy and provide extra fiber in your diet  Some examples of whole-grain foods are whole-wheat breads and pastas, oatmeal, brown rice, and bulgur  · Eat a variety of vegetables every day  Include dark, leafy greens such as spinach, kale, zuleyka greens, and mustard greens  Eat yellow and orange vegetables such as carrots, sweet potatoes, and winter squash  · Eat a variety of fruits every day  Choose fresh or canned fruit (canned in its own juice or light syrup) instead of juice  Fruit juice has very little or no fiber  · Eat low-fat dairy foods    Drink fat-free (skim) milk or 1% milk  Eat fat-free yogurt and low-fat cottage cheese  Try low-fat cheeses such as mozzarella and other reduced-fat cheeses  · Choose meat and other protein foods that are low in fat  Choose beans or other legumes such as split peas or lentils  Choose fish, skinless poultry (chicken or turkey), or lean cuts of red meat (beef or pork)  Before you cook meat or poultry, cut off any visible fat  · Use less fat and oil  Try baking foods instead of frying them  Add less fat, such as margarine, sour cream, regular salad dressing and mayonnaise to foods  Eat fewer high-fat foods  Some examples of high-fat foods include french fries, doughnuts, ice cream, and cakes  · Eat fewer sweets  Limit foods and drinks that are high in sugar  This includes candy, cookies, regular soda, and sweetened drinks  Exercise:  Exercise at least 30 minutes per day on most days of the week  Some examples of exercise include walking, biking, dancing, and swimming  You can also fit in more physical activity by taking the stairs instead of the elevator or parking farther away from stores  Ask your healthcare provider about the best exercise plan for you  © Copyright [employer] 2018 Information is for End User's use only and may not be sold, redistributed or otherwise used for commercial purposes   All illustrations and images included in CareNotes® are the copyrighted property of [employer]  or [address] [place] [address]

## 2019-12-30 NOTE — PROGRESS NOTES
Assessment/Plan:    No problem-specific Assessment & Plan notes found for this encounter  Labs reviewed  Hypothyroid/htn/hyperlipidemia stable  q6m f/u  Use of statins for the purposes of CVD/CVA risks reduction was advised according to USPSTF guidelines along with appropriate continued liver monitoring  Diagnoses and all orders for this visit:    Benign essential hypertension  -     amLODIPine (NORVASC) 5 mg tablet; Take 1 tablet (5 mg total) by mouth daily  -     Comprehensive metabolic panel; Future    Hypothyroidism, unspecified type  -     TSH, 3rd generation; Future    Hyperlipidemia LDL goal <130  -     Lipid Panel with Direct LDL reflex; Future    Medicare annual wellness visit, subsequent    Obesity (BMI 30-39  9)    BMI 31 0-31 9,adult           Return in about 6 months (around 6/30/2020) for Recheck  Subjective:      Patient ID: Sin De Jesus is a 69 y o  female  Chief Complaint   Patient presents with    Follow-up     jlopezcma        HPI  F/u   Tolerating meds  No edema on norvasc  No myalgias on statin  Use of statins for the purposes of CVD/CVA risks reduction was advised according to USPSTF guidelines along with appropriate continued liver monitoring  LT4 daily, tsh in range    The following portions of the patient's history were reviewed and updated as appropriate: allergies, current medications, past family history, past medical history, past social history, past surgical history and problem list     Review of Systems   Respiratory: Negative for shortness of breath  Cardiovascular: Negative for chest pain and leg swelling           Current Outpatient Medications   Medication Sig Dispense Refill    amLODIPine (NORVASC) 5 mg tablet Take 1 tablet (5 mg total) by mouth daily 90 tablet 1    atenolol (TENORMIN) 25 mg tablet take 1 tablet by mouth once daily 180 tablet 1    atorvastatin (LIPITOR) 20 mg tablet Take 1 tablet (20 mg total) by mouth daily 90 tablet 1    hydrochlorothiazide (HYDRODIURIL) 12 5 mg tablet Take 1 tablet (12 5 mg total) by mouth daily 90 tablet 1    levothyroxine 100 mcg tablet take 1 tablet by mouth once daily 90 tablet 1    losartan (COZAAR) 100 MG tablet Take 1 tablet (100 mg total) by mouth daily 90 tablet 1     No current facility-administered medications for this visit  Objective:    /80   Pulse 60   Temp 98 °F (36 7 °C)   Resp 16   Ht 5' 1 5" (1 562 m)   Wt 76 7 kg (169 lb)   SpO2 94%   BMI 31 42 kg/m²        Physical Exam   Constitutional: She appears well-developed  No distress  HENT:   Head: Normocephalic  Right Ear: External ear normal    Left Ear: External ear normal    Mouth/Throat: No oropharyngeal exudate  Eyes: Conjunctivae are normal  No scleral icterus  Neck: Neck supple  No thyromegaly present  Cardiovascular: Normal rate, regular rhythm and intact distal pulses  Pulmonary/Chest: Effort normal and breath sounds normal  No respiratory distress  She has no wheezes  Abdominal: Soft  Bowel sounds are normal  There is no tenderness  There is no guarding  Musculoskeletal: She exhibits no edema or deformity  Neurological: She is alert  She exhibits normal muscle tone  Skin: Skin is warm and dry  No pallor  Psychiatric: Her behavior is normal  Thought content normal    Nursing note and vitals reviewed               Netta Gillis DO

## 2019-12-31 NOTE — PROGRESS NOTES
Assessment and Plan:     Problem List Items Addressed This Visit        Active Problems    Benign essential hypertension    Relevant Medications    amLODIPine (NORVASC) 5 mg tablet    Other Relevant Orders    Comprehensive metabolic panel    BMI 61 2-10 1,DEFRF    Hyperlipidemia LDL goal <130    Relevant Orders    Lipid Panel with Direct LDL reflex    Hypothyroidism    Relevant Orders    TSH, 3rd generation    Medicare annual wellness visit, subsequent - Primary    Obesity (BMI 30-39  9)           Preventive health issues were discussed with patient, and age appropriate screening tests were ordered as noted in patient's After Visit Summary  Personalized health advice and appropriate referrals for health education or preventive services given if needed, as noted in patient's After Visit Summary  History of Present Illness:     Patient presents for Medicare Annual Wellness visit    Patient Care Team:  Dragan Rae DO as PCP - General     Problem List:     Patient Active Problem List   Diagnosis    Arthropathy    Benign essential hypertension    Cataract    Cervical radiculopathy    GE reflux    Hyperlipidemia LDL goal <130    Hypothyroidism    Menopause    Obesity (BMI 30-39  9)    Osteopenia    Breast cancer screening    BMI 31 0-31 9,adult    Medicare annual wellness visit, subsequent    Colon cancer screening    Strain of lumbar region      Past Medical and Surgical History:     Past Medical History:   Diagnosis Date    Arthropathy 10/15/2010    Disease of thyroid gland     Hyperlipidemia     Hypertension     Onychomycosis 03/04/2005     Past Surgical History:   Procedure Laterality Date    EYE SURGERY      bilateral IOL    OK OPEN TX RADIAL HEAD/NECK FRACTURE PROSTHETIC Right 12/5/2016    Procedure: OPEN REDUCTION W/ INTERNAL FIXATION (ORIF) ELBOW;  Surgeon: Candelario Habermann, MD;  Location: [facility];  Service: Orthopedics      Family History:     Family History   Problem Relation Age of Onset    Diabetes Sister     Breast cancer Sister 79      Social History:     Social History     Socioeconomic History    Marital status: /Civil Union     Spouse name: None    Number of children: None    Years of education: None    Highest education level: None   Occupational History    None   Social Needs    Financial resource strain: None    Food insecurity:     Worry: None     Inability: None    Transportation needs:     Medical: None     Non-medical: None   Tobacco Use    Smoking status: Never Smoker    Smokeless tobacco: Never Used   Substance and Sexual Activity    Alcohol use: No    Drug use: No    Sexual activity: None   Lifestyle    Physical activity:     Days per week: None     Minutes per session: None    Stress: None   Relationships    Social connections:     Talks on phone: None     Gets together: None     Attends [employer] service: None     Active member of club or organization: None     Attends meetings of clubs or organizations: None     Relationship status: None    Intimate partner violence:     Fear of current or ex partner: None     Emotionally abused: None     Physically abused: None     Forced sexual activity: None   Other Topics Concern    None   Social History Narrative    None       Medications and Allergies:     Current Outpatient Medications   Medication Sig Dispense Refill    amLODIPine (NORVASC) 5 mg tablet Take 1 tablet (5 mg total) by mouth daily 90 tablet 1    atenolol (TENORMIN) 25 mg tablet take 1 tablet by mouth once daily 180 tablet 1    atorvastatin (LIPITOR) 20 mg tablet Take 1 tablet (20 mg total) by mouth daily 90 tablet 1    hydrochlorothiazide (HYDRODIURIL) 12 5 mg tablet Take 1 tablet (12 5 mg total) by mouth daily 90 tablet 1    levothyroxine 100 mcg tablet take 1 tablet by mouth once daily 90 tablet 1    losartan (COZAAR) 100 MG tablet Take 1 tablet (100 mg total) by mouth daily 90 tablet 1     No current facility-administered medications for this visit  Allergies   Allergen Reactions    Amoxicillin Itching and Other (See Comments)     Other reaction(s): tingling    Penicillins Rash      Immunizations:     Immunization History   Administered Date(s) Administered    Influenza Split High Dose Preservative Free IM 09/20/2013, 10/15/2014, 10/20/2015, 10/04/2016, 09/22/2017    Influenza TIV (IM) 11/09/2005    Influenza, high dose seasonal 0 5 mL 09/20/2018, 10/11/2019    Pneumococcal Conjugate 13-Valent 12/14/2015    Pneumococcal Polysaccharide PPV23 10/17/2012    Tdap 08/21/2007, 11/27/2016    Zoster 10/17/2012      Health Maintenance:         Topic Date Due    CRC Screening: Colonoscopy  1945    CRC Screening: FOBTx3/FIT  12/17/2019    MAMMOGRAM  12/09/2020    CRC Screening: Cologuard  07/08/2022    Hepatitis C Screening  Completed     There are no preventive care reminders to display for this patient  Medicare Health Risk Assessment:     /80   Pulse 60   Temp 98 °F (36 7 °C)   Resp 16   Ht 5' 1 5" (1 562 m)   Wt 76 7 kg (169 lb)   SpO2 94%   BMI 31 42 kg/m²      Dee Bradley is here for her Subsequent Wellness visit  Last Medicare Wellness visit information reviewed, patient interviewed and updates made to the record today  Health Risk Assessment:   Patient rates overall health as excellent  Patient feels that their physical health rating is same  Eyesight was rated as same  Hearing was rated as same  Patient feels that their emotional and mental health rating is same  Pain experienced in the last 7 days has been none  Patient states that she has experienced no weight loss or gain in last 6 months  Depression Screening:   PHQ-2 Score: 0      Fall Risk Screening: In the past year, patient has experienced: no history of falling in past year      Urinary Incontinence Screening:   Patient has not leaked urine accidently in the last six months       Home Safety:  Patient does not have trouble with stairs inside or outside of their home  Patient has working smoke alarms and has working carbon monoxide detector  Home safety hazards include: none  Nutrition:   Current diet is Low Saturated Fat and Limited junk food  Medications:   Patient is currently taking over-the-counter supplements  OTC medications include: see medication list  Patient is able to manage medications  Activities of Daily Living (ADLs)/Instrumental Activities of Daily Living (IADLs):   Walk and transfer into and out of bed and chair?: Yes  Dress and groom yourself?: Yes    Bathe or shower yourself?: Yes    Feed yourself? Yes  Do your laundry/housekeeping?: Yes  Manage your money, pay your bills and track your expenses?: Yes  Make your own meals?: Yes    Do your own shopping?: Yes    Previous Hospitalizations:   Any hospitalizations or ED visits within the last 12 months?: No      Advance Care Planning:   Living will: Yes    Durable POA for healthcare: Yes    Advanced directive: Yes    End of Life Decisions reviewed with patient: No      Cognitive Screening:   Provider or family/friend/caregiver concerned regarding cognition?: No    PREVENTIVE SCREENINGS      Cardiovascular Screening:    General: Screening Not Indicated and History Lipid Disorder      Diabetes Screening:     General: Screening Current      Colorectal Cancer Screening:     General: Screening Current      Breast Cancer Screening:     General: Screening Current      Cervical Cancer Screening:    General: Screening Not Indicated      Osteoporosis Screening:    General: Risks and Benefits Discussed      Abdominal Aortic Aneurysm (AAA) Screening:        General: Screening Not Indicated      Lung Cancer Screening:     General: Screening Not Indicated      Hepatitis C Screening:    General: Screening Current    Hep C Screening Accepted: No     Other Counseling Topics:   Calcium and vitamin D intake and regular weightbearing exercise         Carrillo Mcleod DO

## 2020-01-14 ENCOUNTER — OFFICE VISIT (OUTPATIENT)
Dept: FAMILY MEDICINE CLINIC | Facility: CLINIC | Age: 75
End: 2020-01-14
Payer: COMMERCIAL

## 2020-01-14 VITALS
TEMPERATURE: 97.6 F | OXYGEN SATURATION: 95 % | BODY MASS INDEX: 31.47 KG/M2 | SYSTOLIC BLOOD PRESSURE: 122 MMHG | RESPIRATION RATE: 18 BRPM | HEART RATE: 68 BPM | WEIGHT: 171 LBS | HEIGHT: 62 IN | DIASTOLIC BLOOD PRESSURE: 80 MMHG

## 2020-01-14 DIAGNOSIS — H69.81 ETD (EUSTACHIAN TUBE DYSFUNCTION), RIGHT: ICD-10-CM

## 2020-01-14 DIAGNOSIS — J01.00 ACUTE NON-RECURRENT MAXILLARY SINUSITIS: Primary | ICD-10-CM

## 2020-01-14 PROBLEM — H69.91 ETD (EUSTACHIAN TUBE DYSFUNCTION), RIGHT: Status: ACTIVE | Noted: 2020-01-14

## 2020-01-14 PROCEDURE — 3725F SCREEN DEPRESSION PERFORMED: CPT | Performed by: FAMILY MEDICINE

## 2020-01-14 PROCEDURE — 1160F RVW MEDS BY RX/DR IN RCRD: CPT | Performed by: FAMILY MEDICINE

## 2020-01-14 PROCEDURE — 3008F BODY MASS INDEX DOCD: CPT | Performed by: FAMILY MEDICINE

## 2020-01-14 PROCEDURE — 1036F TOBACCO NON-USER: CPT | Performed by: FAMILY MEDICINE

## 2020-01-14 PROCEDURE — 99213 OFFICE O/P EST LOW 20 MIN: CPT | Performed by: FAMILY MEDICINE

## 2020-01-14 RX ORDER — METHYLPREDNISOLONE 4 MG/1
TABLET ORAL
Qty: 21 TABLET | Refills: 0 | Status: SHIPPED | OUTPATIENT
Start: 2020-01-14 | End: 2020-01-20

## 2020-01-14 RX ORDER — AZITHROMYCIN 250 MG/1
TABLET, FILM COATED ORAL
Qty: 6 TABLET | Refills: 0 | Status: SHIPPED | OUTPATIENT
Start: 2020-01-14 | End: 2020-01-19

## 2020-01-14 NOTE — PROGRESS NOTES
Assessment/Plan:    No problem-specific Assessment & Plan notes found for this encounter  Sinusitis new  ETD new  htn stable     Diagnoses and all orders for this visit:    Acute non-recurrent maxillary sinusitis  -     azithromycin (ZITHROMAX) 250 mg tablet; Take 500mg on day 1, 250mg on days 2-5  -     methylPREDNISolone 4 MG tablet therapy pack; Use as directed on package    ETD (Eustachian tube dysfunction), right  -     azithromycin (ZITHROMAX) 250 mg tablet; Take 500mg on day 1, 250mg on days 2-5  -     methylPREDNISolone 4 MG tablet therapy pack; Use as directed on package              Return if symptoms worsen or fail to improve  Subjective:      Patient ID: Colin Dahl is a 69 y o  female  Chief Complaint   Patient presents with    Sore Throat     [facility] lpn       HPI  Sore throat x 2w  Irritated  Pressure right side ear, no pain, hearing ok, getting clogged that opens with valsalva  Some neck pain  Some cough  No phlegm  Post nasal drip  Nasal dc is clear  No fever  No sneezing  2nd hand smoke    The following portions of the patient's history were reviewed and updated as appropriate: allergies, current medications, past family history, past medical history, past social history, past surgical history and problem list     Review of Systems   Respiratory: Negative for wheezing  Cardiovascular: Negative for chest pain           Current Outpatient Medications   Medication Sig Dispense Refill    amLODIPine (NORVASC) 5 mg tablet Take 1 tablet (5 mg total) by mouth daily 90 tablet 1    atenolol (TENORMIN) 25 mg tablet take 1 tablet by mouth once daily 180 tablet 1    atorvastatin (LIPITOR) 20 mg tablet Take 1 tablet (20 mg total) by mouth daily 90 tablet 1    hydrochlorothiazide (HYDRODIURIL) 12 5 mg tablet Take 1 tablet (12 5 mg total) by mouth daily 90 tablet 1    levothyroxine 100 mcg tablet take 1 tablet by mouth once daily 90 tablet 1    losartan (COZAAR) 100 MG tablet Take 1 tablet (100 mg total) by mouth daily 90 tablet 1    azithromycin (ZITHROMAX) 250 mg tablet Take 500mg on day 1, 250mg on days 2-5 6 tablet 0    methylPREDNISolone 4 MG tablet therapy pack Use as directed on package 21 tablet 0     No current facility-administered medications for this visit  Objective:    /80   Pulse 68   Temp 97 6 °F (36 4 °C)   Resp 18   Ht 5' 1 5" (1 562 m)   Wt 77 6 kg (171 lb)   SpO2 95%   BMI 31 79 kg/m²        Physical Exam   Constitutional: She appears well-developed  No distress  HENT:   Head: Normocephalic  Right Ear: External ear normal    Left Ear: External ear normal    Sinuses tender to percussion, nasal turbinates visualized and appear red and swollen     Eyes: Conjunctivae are normal  No scleral icterus  Neck: Neck supple  Cardiovascular: Normal rate, regular rhythm and intact distal pulses  Pulmonary/Chest: Effort normal and breath sounds normal  No respiratory distress  She has no wheezes  Abdominal: Soft  Musculoskeletal: She exhibits no edema or deformity  Lymphadenopathy:     She has no cervical adenopathy  Neurological: She is alert  Skin: Skin is warm and dry  No pallor  Psychiatric: Her behavior is normal  Thought content normal    Nursing note and vitals reviewed               Evans De Los Santos DO

## 2020-02-19 ENCOUNTER — OFFICE VISIT (OUTPATIENT)
Dept: FAMILY MEDICINE CLINIC | Facility: CLINIC | Age: 75
End: 2020-02-19
Payer: COMMERCIAL

## 2020-02-19 VITALS
SYSTOLIC BLOOD PRESSURE: 124 MMHG | RESPIRATION RATE: 16 BRPM | DIASTOLIC BLOOD PRESSURE: 80 MMHG | WEIGHT: 170 LBS | HEART RATE: 80 BPM | BODY MASS INDEX: 32.1 KG/M2 | HEIGHT: 61 IN | TEMPERATURE: 100.8 F

## 2020-02-19 DIAGNOSIS — E78.5 HYPERLIPIDEMIA LDL GOAL <130: ICD-10-CM

## 2020-02-19 DIAGNOSIS — I10 BENIGN ESSENTIAL HYPERTENSION: ICD-10-CM

## 2020-02-19 DIAGNOSIS — J06.9 VIRAL URI: Primary | ICD-10-CM

## 2020-02-19 PROBLEM — J01.00 ACUTE NON-RECURRENT MAXILLARY SINUSITIS: Status: RESOLVED | Noted: 2020-01-14 | Resolved: 2020-02-19

## 2020-02-19 PROCEDURE — 3074F SYST BP LT 130 MM HG: CPT | Performed by: NURSE PRACTITIONER

## 2020-02-19 PROCEDURE — 3288F FALL RISK ASSESSMENT DOCD: CPT | Performed by: NURSE PRACTITIONER

## 2020-02-19 PROCEDURE — 3079F DIAST BP 80-89 MM HG: CPT | Performed by: NURSE PRACTITIONER

## 2020-02-19 PROCEDURE — 1101F PT FALLS ASSESS-DOCD LE1/YR: CPT | Performed by: NURSE PRACTITIONER

## 2020-02-19 PROCEDURE — 1160F RVW MEDS BY RX/DR IN RCRD: CPT | Performed by: NURSE PRACTITIONER

## 2020-02-19 PROCEDURE — 4040F PNEUMOC VAC/ADMIN/RCVD: CPT | Performed by: NURSE PRACTITIONER

## 2020-02-19 PROCEDURE — 3008F BODY MASS INDEX DOCD: CPT | Performed by: NURSE PRACTITIONER

## 2020-02-19 PROCEDURE — 1036F TOBACCO NON-USER: CPT | Performed by: NURSE PRACTITIONER

## 2020-02-19 PROCEDURE — 99214 OFFICE O/P EST MOD 30 MIN: CPT | Performed by: NURSE PRACTITIONER

## 2020-02-19 NOTE — PROGRESS NOTES
Assessment/Plan:    1  Viral URI  Comments:  Supportive care for viral illness and will follow up for any worsening and no improvement    2  Benign essential hypertension  Comments:  stable    3  Hyperlipidemia LDL goal <130  Comments:  on statin, complaint and tolerating it well              Patient Instructions:  Increase fluid intake, saline nasal rinses, and hot tea with honey and lemon  Cool air humidification can be helpful as well  May take Ibuprofen or Tylenol as needed for pain or fevers  Mucinex D for sinus congestion or Coricidin HBP if you have high blood pressure or a heart condition  Mucinex or Robitussin DM are effective for cough and chest congestion  Supportive care discussed and advised  Advised to RTO for any worsening and no improvement  Follow up for no improvement and worsening of conditions  Patient advised and educated when to see immediate medical care  Return if symptoms worsen or fail to improve  Future Appointments   Date Time Provider Jw Perez   6/30/2020  9:00 AM DO CARMINE Scott [facility]           Subjective:      Patient ID: Christiano Hurley is a 69 y o  female  Chief Complaint   Patient presents with    Cough     C/O cough, congestion for the past few days  No recent travel  Has felt feverish [email or web address]         Vitals:  /80   Pulse 80   Temp (!) 100 8 °F (38 2 °C)   Resp 16   Ht 5' 1" (1 549 m)   Wt 77 1 kg (170 lb)   BMI 32 12 kg/m²     HPI  Patient stated that started with symptoms of cough, congestion, sore throat couple of days ago with occasional nausea  Denies any fever, chills, sob and chest pain  Not taking any OTC  Complaint with medications                      The following portions of the patient's history were reviewed and updated as appropriate: allergies, current medications, past family history, past medical history, past social history, past surgical history and problem list       Review of Systems Constitutional: Negative for chills, diaphoresis, fatigue, fever and unexpected weight change  HENT: Positive for congestion, sneezing and sore throat  Negative for dental problem, drooling, ear discharge, ear pain, facial swelling, hearing loss, mouth sores, nosebleeds, postnasal drip, rhinorrhea, sinus pressure, sinus pain, tinnitus, trouble swallowing and voice change  Respiratory: Positive for cough  Negative for chest tightness, shortness of breath and wheezing  Cardiovascular: Negative  Gastrointestinal: Positive for nausea  Negative for abdominal pain, constipation, diarrhea and vomiting  Musculoskeletal: Negative  Skin: Negative  Neurological: Negative for dizziness, weakness, light-headedness and headaches  Hematological: Negative  Objective:    Social History     Tobacco Use   Smoking Status Never Smoker   Smokeless Tobacco Never Used       Allergies: Allergies   Allergen Reactions    Amoxicillin Itching and Other (See Comments)     Other reaction(s): tingling    Penicillins Rash         Current Outpatient Medications   Medication Sig Dispense Refill    amLODIPine (NORVASC) 5 mg tablet Take 1 tablet (5 mg total) by mouth daily 90 tablet 1    atenolol (TENORMIN) 25 mg tablet take 1 tablet by mouth once daily 180 tablet 1    atorvastatin (LIPITOR) 20 mg tablet Take 1 tablet (20 mg total) by mouth daily 90 tablet 1    hydrochlorothiazide (HYDRODIURIL) 12 5 mg tablet Take 1 tablet (12 5 mg total) by mouth daily 90 tablet 1    levothyroxine 100 mcg tablet take 1 tablet by mouth once daily 90 tablet 1    losartan (COZAAR) 100 MG tablet Take 1 tablet (100 mg total) by mouth daily 90 tablet 1     No current facility-administered medications for this visit  Physical Exam   Constitutional: She is oriented to person, place, and time  She appears well-developed and well-nourished  HENT:   Head: Normocephalic     Right Ear: Tympanic membrane, external ear and ear canal normal    Left Ear: Tympanic membrane, external ear and ear canal normal    Nose: Nose normal  Right sinus exhibits no maxillary sinus tenderness and no frontal sinus tenderness  Left sinus exhibits no maxillary sinus tenderness and no frontal sinus tenderness  Mouth/Throat: Oropharynx is clear and moist and mucous membranes are normal    Neck: Neck supple  Cardiovascular: Normal rate, regular rhythm and normal heart sounds  Pulmonary/Chest: Effort normal and breath sounds normal    Abdominal: Normal appearance and bowel sounds are normal  There is no hepatosplenomegaly  There is no tenderness  There is no rebound  Musculoskeletal: Normal range of motion  Lymphadenopathy:        Right cervical: No superficial cervical and no posterior cervical adenopathy present  Left cervical: No superficial cervical and no posterior cervical adenopathy present  Neurological: She is alert and oriented to person, place, and time  Skin: Skin is warm and dry  Psychiatric: She has a normal mood and affect  Her behavior is normal  Judgment and thought content normal    Vitals reviewed                    Johnie Nyhan, CRNP

## 2020-02-20 ENCOUNTER — TELEPHONE (OUTPATIENT)
Dept: FAMILY MEDICINE CLINIC | Facility: CLINIC | Age: 75
End: 2020-02-20

## 2020-02-20 DIAGNOSIS — Z20.828 EXPOSURE TO THE FLU: Primary | ICD-10-CM

## 2020-02-20 RX ORDER — OSELTAMIVIR PHOSPHATE 75 MG/1
75 CAPSULE ORAL DAILY
Qty: 10 CAPSULE | Refills: 0 | Status: SHIPPED | OUTPATIENT
Start: 2020-02-20 | End: 2020-03-01

## 2020-02-20 NOTE — TELEPHONE ENCOUNTER
tamiflu sent and supposed to take one tablet daily  Advised her that possible side effects of Tamiflu including behavioral disturbances and self injury and advised to stop medication if notices any adverse effects     PATRIC Barton

## 2020-02-28 DIAGNOSIS — J20.9 ACUTE BRONCHITIS, UNSPECIFIED ORGANISM: Primary | ICD-10-CM

## 2020-02-28 RX ORDER — AZITHROMYCIN 250 MG/1
TABLET, FILM COATED ORAL
Qty: 6 TABLET | Refills: 0 | Status: SHIPPED | OUTPATIENT
Start: 2020-02-28 | End: 2020-03-04

## 2020-04-27 DIAGNOSIS — E03.9 HYPOTHYROIDISM, UNSPECIFIED TYPE: ICD-10-CM

## 2020-04-27 RX ORDER — LEVOTHYROXINE SODIUM 0.1 MG/1
100 TABLET ORAL DAILY
Qty: 90 TABLET | Refills: 1 | Status: SHIPPED | OUTPATIENT
Start: 2020-04-27 | End: 2020-10-13 | Stop reason: SDUPTHER

## 2020-05-11 DIAGNOSIS — I10 BENIGN ESSENTIAL HYPERTENSION: ICD-10-CM

## 2020-05-11 RX ORDER — ATENOLOL 25 MG/1
TABLET ORAL
Qty: 180 TABLET | Refills: 1 | Status: SHIPPED | OUTPATIENT
Start: 2020-05-11 | End: 2021-05-11 | Stop reason: SDUPTHER

## 2020-06-01 DIAGNOSIS — I10 BENIGN ESSENTIAL HYPERTENSION: ICD-10-CM

## 2020-06-01 DIAGNOSIS — E78.5 HYPERLIPIDEMIA LDL GOAL <130: ICD-10-CM

## 2020-06-01 RX ORDER — ATORVASTATIN CALCIUM 20 MG/1
20 TABLET, FILM COATED ORAL DAILY
Qty: 90 TABLET | Refills: 1 | Status: SHIPPED | OUTPATIENT
Start: 2020-06-01 | End: 2020-11-25 | Stop reason: SDUPTHER

## 2020-06-01 RX ORDER — HYDROCHLOROTHIAZIDE 12.5 MG/1
12.5 TABLET ORAL DAILY
Qty: 90 TABLET | Refills: 1 | Status: SHIPPED | OUTPATIENT
Start: 2020-06-01 | End: 2020-11-25 | Stop reason: SDUPTHER

## 2020-06-01 RX ORDER — LOSARTAN POTASSIUM 100 MG/1
100 TABLET ORAL DAILY
Qty: 90 TABLET | Refills: 1 | Status: SHIPPED | OUTPATIENT
Start: 2020-06-01 | End: 2020-11-25 | Stop reason: SDUPTHER

## 2020-06-23 LAB
ALBUMIN SERPL-MCNC: 4.5 G/DL (ref 3.7–4.7)
ALBUMIN/GLOB SERPL: 1.7 {RATIO} (ref 1.2–2.2)
ALP SERPL-CCNC: 76 IU/L (ref 39–117)
ALT SERPL-CCNC: 22 IU/L (ref 0–32)
AST SERPL-CCNC: 21 IU/L (ref 0–40)
BILIRUB SERPL-MCNC: 0.5 MG/DL (ref 0–1.2)
BUN SERPL-MCNC: 17 MG/DL (ref 8–27)
BUN/CREAT SERPL: 22 (ref 12–28)
CALCIUM SERPL-MCNC: 9.2 MG/DL (ref 8.7–10.3)
CHLORIDE SERPL-SCNC: 97 MMOL/L (ref 96–106)
CHOLEST SERPL-MCNC: 150 MG/DL (ref 100–199)
CO2 SERPL-SCNC: 23 MMOL/L (ref 20–29)
CREAT SERPL-MCNC: 0.76 MG/DL (ref 0.57–1)
GLOBULIN SER-MCNC: 2.7 G/DL (ref 1.5–4.5)
GLUCOSE SERPL-MCNC: 109 MG/DL (ref 65–99)
HDLC SERPL-MCNC: 51 MG/DL
LDLC SERPL CALC-MCNC: 86 MG/DL (ref 0–99)
MICRODELETION SYND BLD/T FISH: NORMAL
POTASSIUM SERPL-SCNC: 3.9 MMOL/L (ref 3.5–5.2)
PROT SERPL-MCNC: 7.2 G/DL (ref 6–8.5)
SL AMB EGFR AFRICAN AMERICAN: 90 ML/MIN/1.73
SL AMB EGFR NON AFRICAN AMERICAN: 78 ML/MIN/1.73
SODIUM SERPL-SCNC: 137 MMOL/L (ref 134–144)
TRIGL SERPL-MCNC: 66 MG/DL (ref 0–149)
TSH SERPL DL<=0.005 MIU/L-ACNC: 0.36 UIU/ML (ref 0.45–4.5)

## 2020-06-26 DIAGNOSIS — I10 BENIGN ESSENTIAL HYPERTENSION: ICD-10-CM

## 2020-06-26 RX ORDER — AMLODIPINE BESYLATE 5 MG/1
5 TABLET ORAL DAILY
Qty: 90 TABLET | Refills: 1 | Status: SHIPPED | OUTPATIENT
Start: 2020-06-26 | End: 2020-12-18 | Stop reason: SDUPTHER

## 2020-06-30 ENCOUNTER — OFFICE VISIT (OUTPATIENT)
Dept: FAMILY MEDICINE CLINIC | Facility: CLINIC | Age: 75
End: 2020-06-30
Payer: COMMERCIAL

## 2020-06-30 VITALS
TEMPERATURE: 97.3 F | HEART RATE: 72 BPM | DIASTOLIC BLOOD PRESSURE: 80 MMHG | BODY MASS INDEX: 31.91 KG/M2 | WEIGHT: 169 LBS | RESPIRATION RATE: 16 BRPM | SYSTOLIC BLOOD PRESSURE: 132 MMHG | OXYGEN SATURATION: 97 % | HEIGHT: 61 IN

## 2020-06-30 DIAGNOSIS — E03.9 HYPOTHYROIDISM, UNSPECIFIED TYPE: Primary | ICD-10-CM

## 2020-06-30 DIAGNOSIS — R73.03 PREDIABETES: ICD-10-CM

## 2020-06-30 DIAGNOSIS — E66.9 OBESITY (BMI 30-39.9): ICD-10-CM

## 2020-06-30 DIAGNOSIS — Z78.0 MENOPAUSE: ICD-10-CM

## 2020-06-30 DIAGNOSIS — I10 BENIGN ESSENTIAL HYPERTENSION: ICD-10-CM

## 2020-06-30 DIAGNOSIS — E78.5 HYPERLIPIDEMIA LDL GOAL <130: ICD-10-CM

## 2020-06-30 DIAGNOSIS — Z12.39 BREAST CANCER SCREENING: ICD-10-CM

## 2020-06-30 PROCEDURE — 1036F TOBACCO NON-USER: CPT | Performed by: FAMILY MEDICINE

## 2020-06-30 PROCEDURE — 4040F PNEUMOC VAC/ADMIN/RCVD: CPT | Performed by: FAMILY MEDICINE

## 2020-06-30 PROCEDURE — 3075F SYST BP GE 130 - 139MM HG: CPT | Performed by: FAMILY MEDICINE

## 2020-06-30 PROCEDURE — 99214 OFFICE O/P EST MOD 30 MIN: CPT | Performed by: FAMILY MEDICINE

## 2020-06-30 PROCEDURE — 3008F BODY MASS INDEX DOCD: CPT | Performed by: FAMILY MEDICINE

## 2020-06-30 PROCEDURE — 3079F DIAST BP 80-89 MM HG: CPT | Performed by: FAMILY MEDICINE

## 2020-06-30 PROCEDURE — 1160F RVW MEDS BY RX/DR IN RCRD: CPT | Performed by: FAMILY MEDICINE

## 2020-07-21 ENCOUNTER — OFFICE VISIT (OUTPATIENT)
Dept: OBGYN CLINIC | Facility: CLINIC | Age: 75
End: 2020-07-21
Payer: COMMERCIAL

## 2020-07-21 ENCOUNTER — APPOINTMENT (OUTPATIENT)
Dept: RADIOLOGY | Facility: CLINIC | Age: 75
End: 2020-07-21
Payer: COMMERCIAL

## 2020-07-21 VITALS
WEIGHT: 169.9 LBS | TEMPERATURE: 99.3 F | DIASTOLIC BLOOD PRESSURE: 83 MMHG | HEART RATE: 60 BPM | HEIGHT: 61 IN | BODY MASS INDEX: 32.08 KG/M2 | SYSTOLIC BLOOD PRESSURE: 164 MMHG

## 2020-07-21 DIAGNOSIS — M25.562 LEFT KNEE PAIN, UNSPECIFIED CHRONICITY: ICD-10-CM

## 2020-07-21 DIAGNOSIS — M25.562 CHRONIC PAIN OF LEFT KNEE: ICD-10-CM

## 2020-07-21 DIAGNOSIS — M17.12 PRIMARY OSTEOARTHRITIS OF LEFT KNEE: Primary | ICD-10-CM

## 2020-07-21 DIAGNOSIS — G89.29 CHRONIC PAIN OF LEFT KNEE: ICD-10-CM

## 2020-07-21 PROCEDURE — 99214 OFFICE O/P EST MOD 30 MIN: CPT | Performed by: ORTHOPAEDIC SURGERY

## 2020-07-21 PROCEDURE — 3079F DIAST BP 80-89 MM HG: CPT | Performed by: ORTHOPAEDIC SURGERY

## 2020-07-21 PROCEDURE — 20610 DRAIN/INJ JOINT/BURSA W/O US: CPT | Performed by: ORTHOPAEDIC SURGERY

## 2020-07-21 PROCEDURE — 3008F BODY MASS INDEX DOCD: CPT | Performed by: ORTHOPAEDIC SURGERY

## 2020-07-21 PROCEDURE — 73562 X-RAY EXAM OF KNEE 3: CPT

## 2020-07-21 PROCEDURE — 1160F RVW MEDS BY RX/DR IN RCRD: CPT | Performed by: ORTHOPAEDIC SURGERY

## 2020-07-21 PROCEDURE — 4040F PNEUMOC VAC/ADMIN/RCVD: CPT | Performed by: ORTHOPAEDIC SURGERY

## 2020-07-21 PROCEDURE — 3077F SYST BP >= 140 MM HG: CPT | Performed by: ORTHOPAEDIC SURGERY

## 2020-07-21 PROCEDURE — 1036F TOBACCO NON-USER: CPT | Performed by: ORTHOPAEDIC SURGERY

## 2020-07-21 RX ORDER — LIDOCAINE HYDROCHLORIDE 10 MG/ML
4 INJECTION, SOLUTION INFILTRATION; PERINEURAL
Status: COMPLETED | OUTPATIENT
Start: 2020-07-21 | End: 2020-07-21

## 2020-07-21 RX ORDER — DEXAMETHASONE SODIUM PHOSPHATE 100 MG/10ML
40 INJECTION INTRAMUSCULAR; INTRAVENOUS
Status: COMPLETED | OUTPATIENT
Start: 2020-07-21 | End: 2020-07-21

## 2020-07-21 RX ADMIN — DEXAMETHASONE SODIUM PHOSPHATE 40 MG: 100 INJECTION INTRAMUSCULAR; INTRAVENOUS at 11:20

## 2020-07-21 RX ADMIN — LIDOCAINE HYDROCHLORIDE 4 ML: 10 INJECTION, SOLUTION INFILTRATION; PERINEURAL at 11:20

## 2020-07-21 NOTE — PROGRESS NOTES
Assessment/Plan:  1  Primary osteoarthritis of left knee     2  Chronic pain of left knee  XR knee 3 vw left non injury       Scribe Attestation    I,:   Ángel Stafford am acting as a scribe while in the presence of the attending physician :        I,:   Mg Lorenzo MD personally performed the services described in this documentation    as scribed in my presence :            Gokul Mendez is suffering with severe osteoarthritis in her left knee as evident on her x-rays  We did spend time reviewing her imaging together in the office today  We discussed non operative treatment options as she is not interested in operative intervention for her left knee  I recommended a corticosteroid injection for pain relief  After discussing the risks and benefits, she elected to proceed with this  The injection was administered as detailed below without issue and was well tolerated by the patient  Post injection instructions were provided  She understands that repeat injection can occur no sooner than three months  She plans to continue utilizing her knee sleeve her activity and utilizing Advil for breakthrough pain  We will plan to see her back on as-needed basis for this issue    Large joint arthrocentesis: L knee  Date/Time: 7/21/2020 11:20 AM  Consent given by: patient  Site marked: site marked  Timeout: Immediately prior to procedure a time out was called to verify the correct patient, procedure, equipment, support staff and site/side marked as required   Supporting Documentation  Indications: pain   Procedure Details  Location: knee - L knee  Preparation: Patient was prepped and draped in the usual sterile fashion  Needle size: 22 G  Ultrasound guidance: no  Approach: anterolateral  Medications administered: 4 mL lidocaine 1 %; 40 mg dexamethasone 100 mg/10 mL    Patient tolerance: patient tolerated the procedure well with no immediate complications  Dressing:  Sterile dressing applied          Subjective: Montana Cabrera is a 69 y o  female who presents to the office today for initial evaluation of chronic left knee pain  She states that in 2012 she was treated conservatively by Dr Gianni Braun for a left knee tibial plateau fracture  She states that she did recover from this successfully but since that time she has experienced aching pain about the medial aspect of her left knee  This has been worsening in the last few months  She does report utilizing a knee sleeve during activity and also uses Advil for pain relief  She states that these are both somewhat effective at helping to alleviate her pain  She describes a moderate to severe aching pain about the medial aspect of the knee that is worse with activity and somewhat better at rest   She does note good range of motion and strength about her knee  She does report receiving corticosteroid injection from Dr Gianni Braun in 2012 which did help with her symptoms  She does report that she is not interested in operative intervention  She denies any recent acute injury or trauma  She denies any distal paresthesias of the lower extremity  Review of Systems   Constitutional: Positive for activity change  Negative for chills, fever and unexpected weight change  HENT: Negative for hearing loss, nosebleeds and sore throat  Eyes: Negative for pain, redness and visual disturbance  Respiratory: Negative for cough, shortness of breath and wheezing  Cardiovascular: Negative for chest pain, palpitations and leg swelling  Gastrointestinal: Negative for abdominal pain, nausea and vomiting  Endocrine: Negative for polydipsia and polyuria  Genitourinary: Negative for dysuria and hematuria  Musculoskeletal: Positive for arthralgias  Negative for joint swelling and myalgias  See HPI   Skin: Negative for rash and wound  Neurological: Negative for dizziness, numbness and headaches     Psychiatric/Behavioral: Negative for decreased concentration and suicidal ideas  The patient is not nervous/anxious  Past Medical History:   Diagnosis Date    Arthropathy 10/15/2010    Disease of thyroid gland     Hyperlipidemia     Hypertension     Onychomycosis 03/04/2005       Past Surgical History:   Procedure Laterality Date    EYE SURGERY      bilateral IOL    OR OPEN TX RADIAL HEAD/NECK FRACTURE PROSTHETIC Right 12/5/2016    Procedure: OPEN REDUCTION W/ INTERNAL FIXATION (ORIF) ELBOW;  Surgeon: Emeli Arias MD;  Location: [address] [place] [address];  Service: Orthopedics       Family History   Problem Relation Age of Onset    Diabetes Sister     Breast cancer Sister 79       Social History     Occupational History    Not on file   Tobacco Use    Smoking status: Never Smoker    Smokeless tobacco: Never Used   Substance and Sexual Activity    Alcohol use: No    Drug use: No    Sexual activity: Not on file         Current Outpatient Medications:     amLODIPine (NORVASC) 5 mg tablet, Take 1 tablet (5 mg total) by mouth daily, Disp: 90 tablet, Rfl: 1    atenolol (TENORMIN) 25 mg tablet, take 1 tablet by mouth once daily, Disp: 180 tablet, Rfl: 1    atorvastatin (LIPITOR) 20 mg tablet, Take 1 tablet (20 mg total) by mouth daily, Disp: 90 tablet, Rfl: 1    hydrochlorothiazide (HYDRODIURIL) 12 5 mg tablet, Take 1 tablet (12 5 mg total) by mouth daily, Disp: 90 tablet, Rfl: 1    levothyroxine 100 mcg tablet, Take 1 tablet (100 mcg total) by mouth daily, Disp: 90 tablet, Rfl: 1    losartan (Cozaar) 100 MG tablet, Take 1 tablet (100 mg total) by mouth daily, Disp: 90 tablet, Rfl: 1    Allergies   Allergen Reactions    Amoxicillin Itching and Other (See Comments)     Other reaction(s): tingling    Penicillins Rash       Objective:  Vitals:    07/21/20 1047   BP: 164/83   Pulse: 60   Temp: 99 3 °F (37 4 °C)       Left Knee Exam     Tenderness   The patient is experiencing tenderness in the medial joint line  Range of Motion   Left knee extension: 0     Left knee flexion: 120  Tests   Varus: negative Valgus: negative  Drawer:  Anterior - negative     Posterior - negative    Other   Erythema: absent  Scars: absent  Sensation: normal  Pulse: present  Swelling: none  Effusion: no effusion present    Comments:  Varus deformity passively correctable in neutral  Stable at 0, 30 90°  Neurovascularly intact distally  No parapatellar crepitance noted  Patellofemoral grind:  Negative          Observations   Left Knee   Negative for effusion  Physical Exam   Constitutional: She is oriented to person, place, and time  She appears well-developed and well-nourished  HENT:   Head: Normocephalic and atraumatic  Eyes: Pupils are equal, round, and reactive to light  Conjunctivae are normal    Neck: Normal range of motion  Neck supple  Cardiovascular: Normal rate and intact distal pulses  Pulmonary/Chest: Effort normal  No respiratory distress  Musculoskeletal:        Left knee: She exhibits no effusion  As noted in HPI   Neurological: She is alert and oriented to person, place, and time  Skin: Skin is warm and dry  Psychiatric: She has a normal mood and affect  Her behavior is normal    Nursing note and vitals reviewed  I have personally reviewed pertinent films in PACS and my interpretation is as follows:  X-ray of the left knee obtained on 07/21/2020 demonstrates severe end-stage degenerative change as evidenced by joint space narrowing most prominent in the medial compartment with bone-on-bone contact, sclerosis, marginal osteophytosis, and tibial subluxation  There is no acute fracture, dislocation, lytic or blastic lesion

## 2020-10-09 ENCOUNTER — OFFICE VISIT (OUTPATIENT)
Dept: FAMILY MEDICINE CLINIC | Facility: CLINIC | Age: 75
End: 2020-10-09
Payer: COMMERCIAL

## 2020-10-09 VITALS
WEIGHT: 169 LBS | TEMPERATURE: 98.4 F | RESPIRATION RATE: 16 BRPM | SYSTOLIC BLOOD PRESSURE: 140 MMHG | HEART RATE: 64 BPM | DIASTOLIC BLOOD PRESSURE: 80 MMHG | BODY MASS INDEX: 31.91 KG/M2 | HEIGHT: 61 IN

## 2020-10-09 DIAGNOSIS — I10 BENIGN ESSENTIAL HYPERTENSION: ICD-10-CM

## 2020-10-09 DIAGNOSIS — R07.81 RIB PAIN ON RIGHT SIDE: Primary | ICD-10-CM

## 2020-10-09 DIAGNOSIS — Z23 IMMUNIZATION DUE: ICD-10-CM

## 2020-10-09 DIAGNOSIS — S16.1XXA STRAIN OF NECK MUSCLE, INITIAL ENCOUNTER: ICD-10-CM

## 2020-10-09 PROCEDURE — 90662 IIV NO PRSV INCREASED AG IM: CPT | Performed by: FAMILY MEDICINE

## 2020-10-09 PROCEDURE — 99214 OFFICE O/P EST MOD 30 MIN: CPT | Performed by: FAMILY MEDICINE

## 2020-10-09 PROCEDURE — G0008 ADMIN INFLUENZA VIRUS VAC: HCPCS | Performed by: FAMILY MEDICINE

## 2020-10-09 RX ORDER — CYCLOBENZAPRINE HCL 10 MG
10 TABLET ORAL
Qty: 30 TABLET | Refills: 0 | Status: SHIPPED | OUTPATIENT
Start: 2020-10-09 | End: 2021-04-20

## 2020-10-09 RX ORDER — MELOXICAM 15 MG/1
15 TABLET ORAL DAILY PRN
Qty: 90 TABLET | Refills: 0 | Status: SHIPPED | OUTPATIENT
Start: 2020-10-09 | End: 2022-07-08

## 2020-10-13 ENCOUNTER — TELEPHONE (OUTPATIENT)
Dept: FAMILY MEDICINE CLINIC | Facility: CLINIC | Age: 75
End: 2020-10-13

## 2020-10-13 DIAGNOSIS — E03.9 HYPOTHYROIDISM, UNSPECIFIED TYPE: ICD-10-CM

## 2020-10-13 RX ORDER — LEVOTHYROXINE SODIUM 0.1 MG/1
100 TABLET ORAL DAILY
Qty: 90 TABLET | Refills: 1 | Status: SHIPPED | OUTPATIENT
Start: 2020-10-13 | End: 2021-04-02 | Stop reason: SDUPTHER

## 2020-10-16 ENCOUNTER — OFFICE VISIT (OUTPATIENT)
Dept: URGENT CARE | Facility: CLINIC | Age: 75
End: 2020-10-16
Payer: COMMERCIAL

## 2020-10-16 ENCOUNTER — APPOINTMENT (OUTPATIENT)
Dept: RADIOLOGY | Facility: CLINIC | Age: 75
End: 2020-10-16
Payer: COMMERCIAL

## 2020-10-16 VITALS
BODY MASS INDEX: 32.28 KG/M2 | DIASTOLIC BLOOD PRESSURE: 81 MMHG | RESPIRATION RATE: 16 BRPM | WEIGHT: 171 LBS | HEIGHT: 61 IN | TEMPERATURE: 98.4 F | HEART RATE: 81 BPM | OXYGEN SATURATION: 96 % | SYSTOLIC BLOOD PRESSURE: 145 MMHG

## 2020-10-16 DIAGNOSIS — R35.0 FREQUENT URINATION: Primary | ICD-10-CM

## 2020-10-16 DIAGNOSIS — R10.9 RIGHT FLANK PAIN: ICD-10-CM

## 2020-10-16 DIAGNOSIS — R07.81 RIB PAIN ON RIGHT SIDE: ICD-10-CM

## 2020-10-16 LAB
SL AMB  POCT GLUCOSE, UA: ABNORMAL
SL AMB LEUKOCYTE ESTERASE,UA: ABNORMAL
SL AMB POCT BILIRUBIN,UA: ABNORMAL
SL AMB POCT BLOOD,UA: ABNORMAL
SL AMB POCT CLARITY,UA: CLEAR
SL AMB POCT COLOR,UA: ABNORMAL
SL AMB POCT KETONES,UA: ABNORMAL
SL AMB POCT NITRITE,UA: ABNORMAL
SL AMB POCT PH,UA: 7.5
SL AMB POCT SPECIFIC GRAVITY,UA: 1
SL AMB POCT URINE PROTEIN: ABNORMAL
SL AMB POCT UROBILINOGEN: 0.2

## 2020-10-16 PROCEDURE — 3725F SCREEN DEPRESSION PERFORMED: CPT | Performed by: PHYSICIAN ASSISTANT

## 2020-10-16 PROCEDURE — 3079F DIAST BP 80-89 MM HG: CPT | Performed by: PHYSICIAN ASSISTANT

## 2020-10-16 PROCEDURE — 87086 URINE CULTURE/COLONY COUNT: CPT | Performed by: PHYSICIAN ASSISTANT

## 2020-10-16 PROCEDURE — 1160F RVW MEDS BY RX/DR IN RCRD: CPT | Performed by: PHYSICIAN ASSISTANT

## 2020-10-16 PROCEDURE — 81002 URINALYSIS NONAUTO W/O SCOPE: CPT | Performed by: PHYSICIAN ASSISTANT

## 2020-10-16 PROCEDURE — 1036F TOBACCO NON-USER: CPT | Performed by: PHYSICIAN ASSISTANT

## 2020-10-16 PROCEDURE — 71101 X-RAY EXAM UNILAT RIBS/CHEST: CPT

## 2020-10-16 PROCEDURE — 99213 OFFICE O/P EST LOW 20 MIN: CPT | Performed by: PHYSICIAN ASSISTANT

## 2020-10-17 LAB — BACTERIA UR CULT: NORMAL

## 2020-11-25 DIAGNOSIS — I10 BENIGN ESSENTIAL HYPERTENSION: ICD-10-CM

## 2020-11-25 DIAGNOSIS — E78.5 HYPERLIPIDEMIA LDL GOAL <130: ICD-10-CM

## 2020-11-25 RX ORDER — HYDROCHLOROTHIAZIDE 12.5 MG/1
12.5 TABLET ORAL DAILY
Qty: 90 TABLET | Refills: 1 | Status: SHIPPED | OUTPATIENT
Start: 2020-11-25 | End: 2021-05-25 | Stop reason: SDUPTHER

## 2020-11-25 RX ORDER — ATORVASTATIN CALCIUM 20 MG/1
20 TABLET, FILM COATED ORAL DAILY
Qty: 90 TABLET | Refills: 1 | Status: SHIPPED | OUTPATIENT
Start: 2020-11-25 | End: 2021-05-25 | Stop reason: SDUPTHER

## 2020-11-25 RX ORDER — LOSARTAN POTASSIUM 100 MG/1
100 TABLET ORAL DAILY
Qty: 90 TABLET | Refills: 1 | Status: SHIPPED | OUTPATIENT
Start: 2020-11-25 | End: 2021-05-25 | Stop reason: SDUPTHER

## 2020-12-16 LAB
ALBUMIN SERPL-MCNC: 4.3 G/DL (ref 3.7–4.7)
ALBUMIN/GLOB SERPL: 1.6 {RATIO} (ref 1.2–2.2)
ALP SERPL-CCNC: 79 IU/L (ref 39–117)
ALT SERPL-CCNC: 18 IU/L (ref 0–32)
AST SERPL-CCNC: 19 IU/L (ref 0–40)
BILIRUB SERPL-MCNC: 0.5 MG/DL (ref 0–1.2)
BUN SERPL-MCNC: 15 MG/DL (ref 8–27)
BUN/CREAT SERPL: 19 (ref 12–28)
CALCIUM SERPL-MCNC: 9.6 MG/DL (ref 8.7–10.3)
CHLORIDE SERPL-SCNC: 101 MMOL/L (ref 96–106)
CO2 SERPL-SCNC: 23 MMOL/L (ref 20–29)
CREAT SERPL-MCNC: 0.78 MG/DL (ref 0.57–1)
GLOBULIN SER-MCNC: 2.7 G/DL (ref 1.5–4.5)
GLUCOSE SERPL-MCNC: 102 MG/DL (ref 65–99)
HBA1C MFR BLD: 5.8 % (ref 4.8–5.6)
POTASSIUM SERPL-SCNC: 3.9 MMOL/L (ref 3.5–5.2)
PROT SERPL-MCNC: 7 G/DL (ref 6–8.5)
SL AMB EGFR AFRICAN AMERICAN: 87 ML/MIN/1.73
SL AMB EGFR NON AFRICAN AMERICAN: 76 ML/MIN/1.73
SODIUM SERPL-SCNC: 139 MMOL/L (ref 134–144)
TSH SERPL DL<=0.005 MIU/L-ACNC: 0.5 UIU/ML (ref 0.45–4.5)

## 2020-12-18 DIAGNOSIS — I10 BENIGN ESSENTIAL HYPERTENSION: ICD-10-CM

## 2020-12-18 RX ORDER — AMLODIPINE BESYLATE 5 MG/1
5 TABLET ORAL DAILY
Qty: 90 TABLET | Refills: 1 | Status: SHIPPED | OUTPATIENT
Start: 2020-12-18 | End: 2021-06-28 | Stop reason: SDUPTHER

## 2020-12-26 PROBLEM — S16.1XXA NECK STRAIN: Status: RESOLVED | Noted: 2020-10-09 | Resolved: 2020-12-26

## 2020-12-26 PROBLEM — S39.012A STRAIN OF LUMBAR REGION: Status: RESOLVED | Noted: 2019-08-02 | Resolved: 2020-12-26

## 2020-12-30 ENCOUNTER — OFFICE VISIT (OUTPATIENT)
Dept: FAMILY MEDICINE CLINIC | Facility: CLINIC | Age: 75
End: 2020-12-30
Payer: COMMERCIAL

## 2020-12-30 VITALS
DIASTOLIC BLOOD PRESSURE: 66 MMHG | TEMPERATURE: 97.9 F | HEART RATE: 84 BPM | HEIGHT: 61 IN | SYSTOLIC BLOOD PRESSURE: 122 MMHG | WEIGHT: 171 LBS | RESPIRATION RATE: 16 BRPM | BODY MASS INDEX: 32.28 KG/M2

## 2020-12-30 DIAGNOSIS — R73.03 PREDIABETES: ICD-10-CM

## 2020-12-30 DIAGNOSIS — I10 BENIGN ESSENTIAL HYPERTENSION: ICD-10-CM

## 2020-12-30 DIAGNOSIS — Z00.00 MEDICARE ANNUAL WELLNESS VISIT, SUBSEQUENT: Primary | ICD-10-CM

## 2020-12-30 DIAGNOSIS — E03.9 HYPOTHYROIDISM, UNSPECIFIED TYPE: ICD-10-CM

## 2020-12-30 DIAGNOSIS — M12.9 ARTHROPATHY: ICD-10-CM

## 2020-12-30 DIAGNOSIS — E78.5 HYPERLIPIDEMIA LDL GOAL <130: ICD-10-CM

## 2020-12-30 PROCEDURE — 3074F SYST BP LT 130 MM HG: CPT | Performed by: FAMILY MEDICINE

## 2020-12-30 PROCEDURE — 3078F DIAST BP <80 MM HG: CPT | Performed by: FAMILY MEDICINE

## 2020-12-30 PROCEDURE — 99214 OFFICE O/P EST MOD 30 MIN: CPT | Performed by: FAMILY MEDICINE

## 2020-12-30 PROCEDURE — G0439 PPPS, SUBSEQ VISIT: HCPCS | Performed by: FAMILY MEDICINE

## 2020-12-30 PROCEDURE — 1125F AMNT PAIN NOTED PAIN PRSNT: CPT | Performed by: FAMILY MEDICINE

## 2020-12-30 PROCEDURE — 3008F BODY MASS INDEX DOCD: CPT | Performed by: FAMILY MEDICINE

## 2020-12-30 PROCEDURE — 3725F SCREEN DEPRESSION PERFORMED: CPT | Performed by: FAMILY MEDICINE

## 2020-12-30 PROCEDURE — 1036F TOBACCO NON-USER: CPT | Performed by: FAMILY MEDICINE

## 2020-12-30 PROCEDURE — 1160F RVW MEDS BY RX/DR IN RCRD: CPT | Performed by: FAMILY MEDICINE

## 2020-12-30 PROCEDURE — 1170F FXNL STATUS ASSESSED: CPT | Performed by: FAMILY MEDICINE

## 2021-01-12 ENCOUNTER — HOSPITAL ENCOUNTER (OUTPATIENT)
Dept: RADIOLOGY | Facility: HOSPITAL | Age: 76
Discharge: HOME | End: 2021-01-12
Attending: FAMILY MEDICINE
Payer: COMMERCIAL

## 2021-01-12 VITALS — HEIGHT: 61 IN | WEIGHT: 171 LBS | BODY MASS INDEX: 32.28 KG/M2

## 2021-01-12 DIAGNOSIS — Z12.39 BREAST CANCER SCREENING: ICD-10-CM

## 2021-01-12 DIAGNOSIS — Z13.820 SCREENING FOR OSTEOPOROSIS: ICD-10-CM

## 2021-01-12 DIAGNOSIS — Z78.0 MENOPAUSE: ICD-10-CM

## 2021-01-12 PROCEDURE — 77067 SCR MAMMO BI INCL CAD: CPT

## 2021-01-12 PROCEDURE — 77080 DXA BONE DENSITY AXIAL: CPT

## 2021-01-12 PROCEDURE — 77063 BREAST TOMOSYNTHESIS BI: CPT

## 2021-02-24 ENCOUNTER — APPOINTMENT (OUTPATIENT)
Dept: RADIOLOGY | Facility: CLINIC | Age: 76
End: 2021-02-24
Payer: COMMERCIAL

## 2021-02-24 ENCOUNTER — OFFICE VISIT (OUTPATIENT)
Dept: OBGYN CLINIC | Facility: CLINIC | Age: 76
End: 2021-02-24
Payer: COMMERCIAL

## 2021-02-24 VITALS
DIASTOLIC BLOOD PRESSURE: 75 MMHG | HEIGHT: 61 IN | HEART RATE: 65 BPM | BODY MASS INDEX: 32.59 KG/M2 | SYSTOLIC BLOOD PRESSURE: 162 MMHG | WEIGHT: 172.6 LBS

## 2021-02-24 DIAGNOSIS — S76.312A HAMSTRING STRAIN, LEFT, INITIAL ENCOUNTER: ICD-10-CM

## 2021-02-24 DIAGNOSIS — M17.12 PRIMARY OSTEOARTHRITIS OF LEFT KNEE: Primary | ICD-10-CM

## 2021-02-24 DIAGNOSIS — M17.12 PRIMARY OSTEOARTHRITIS OF LEFT KNEE: ICD-10-CM

## 2021-02-24 DIAGNOSIS — M76.899 BICEPS FEMORIS TENDONITIS: ICD-10-CM

## 2021-02-24 PROCEDURE — 1160F RVW MEDS BY RX/DR IN RCRD: CPT | Performed by: ORTHOPAEDIC SURGERY

## 2021-02-24 PROCEDURE — 3077F SYST BP >= 140 MM HG: CPT | Performed by: ORTHOPAEDIC SURGERY

## 2021-02-24 PROCEDURE — 99214 OFFICE O/P EST MOD 30 MIN: CPT | Performed by: ORTHOPAEDIC SURGERY

## 2021-02-24 PROCEDURE — 73560 X-RAY EXAM OF KNEE 1 OR 2: CPT

## 2021-02-24 PROCEDURE — 3008F BODY MASS INDEX DOCD: CPT | Performed by: ORTHOPAEDIC SURGERY

## 2021-02-24 PROCEDURE — 1036F TOBACCO NON-USER: CPT | Performed by: ORTHOPAEDIC SURGERY

## 2021-02-24 PROCEDURE — 3078F DIAST BP <80 MM HG: CPT | Performed by: ORTHOPAEDIC SURGERY

## 2021-02-24 NOTE — PROGRESS NOTES
Assessment/Plan:  1  Primary osteoarthritis of left knee  XR knee 1 or 2 vw left   2  Hamstring strain, left, initial encounter     3  Biceps femoris tendonitis         Scribe Attestation    I,:  Sena Youngblood am acting as a scribe while in the presence of the attending physician :       I,:  Candelario Habermann, MD personally performed the services described in this documentation    as scribed in my presence :             Upon reviewing x-rays, taking a thorough history and my physical exam Dakotah Wren is presenting with signs and symptoms consistent with left biceps femoris tendinitis  I do not appreciate a tear  The tendon is easily palpable  She did not experience a pop nor developed bruising  I provided her a home exercise program to help relieve her symptoms  This program was reviewed with her by my   She was also given a hinged knee brace that was applied by my DME fitter  Her medial compartment osteoarthritis has progressed but fortunately it is not causing her much pain currently  The knee is presenting with some instability and this is observed with her walking as well  My goal is the hinged knee brace will help correct this and provide more stability  In regards to the tenderness over the tibia her x-rays are negative for any abnormality or signs of fracture  This is likely stress caused by the severe osteoarthritis  She had no further questions  She can follow up with me as needed for this  Subjective:   Alec Zepeda is a 77 y o  female who presents to the office today for evaluation of her left knee  She states she has been shoveling a significant amount of snow lately  This past Sunday she developed the pain in the posterior lateral aspect of the left knee  This intermittent pain is described as moderate, burning ache that will tingle on occasion  This is exacerbated by prolonged walking or standing  She denies radicular symptoms or distal paresthesias    She will take a combination of Tylenol and Advil which provides her some relief  On occasion she will also wear a knee sleeve which seems to help as well  Ramirez Steel has an additional complaint of tenderness in the proximal medial tibia  She states she does have a history of a hairline fracture in that region many years ago  She states the area will swell intermittently  She describes this as an annoyance and does not necessarily address it  The Tylenol and Advil also help this ailment  Review of Systems   Constitutional: Negative for chills, fever and unexpected weight change  HENT: Negative for hearing loss, nosebleeds and sore throat  Eyes: Negative for pain, redness and visual disturbance  Respiratory: Negative for cough, shortness of breath and wheezing  Cardiovascular: Negative for chest pain, palpitations and leg swelling  Gastrointestinal: Negative for abdominal pain, nausea and vomiting  Endocrine: Negative for polydipsia and polyuria  Genitourinary: Negative for dysuria and hematuria  Musculoskeletal: Positive for gait problem  See HPI  Varus thrust    Skin: Negative for rash and wound  Neurological: Negative for dizziness, numbness and headaches  Psychiatric/Behavioral: Negative for decreased concentration and suicidal ideas  The patient is not nervous/anxious            Past Medical History:   Diagnosis Date    Arthropathy 10/15/2010    Disease of thyroid gland     Hyperlipidemia     Hypertension     Onychomycosis 03/04/2005       Past Surgical History:   Procedure Laterality Date    EYE SURGERY      bilateral IOL    AK OPEN TX RADIAL HEAD/NECK FRACTURE PROSTHETIC Right 12/5/2016    Procedure: OPEN REDUCTION W/ INTERNAL FIXATION (ORIF) ELBOW;  Surgeon: Stephanie Snider MD;  Location: [facility];  Service: Orthopedics       Family History   Problem Relation Age of Onset    Diabetes Sister     Breast cancer Sister 79       Social History     Occupational History    Not on file   Tobacco Use    Smoking status: Passive Smoke Exposure - Never Smoker    Smokeless tobacco: Never Used   Substance and Sexual Activity    Alcohol use: No    Drug use: No    Sexual activity: Not on file         Current Outpatient Medications:     amLODIPine (NORVASC) 5 mg tablet, Take 1 tablet (5 mg total) by mouth daily, Disp: 90 tablet, Rfl: 1    atenolol (TENORMIN) 25 mg tablet, take 1 tablet by mouth once daily, Disp: 180 tablet, Rfl: 1    atorvastatin (LIPITOR) 20 mg tablet, Take 1 tablet (20 mg total) by mouth daily, Disp: 90 tablet, Rfl: 1    hydrochlorothiazide (HYDRODIURIL) 12 5 mg tablet, Take 1 tablet (12 5 mg total) by mouth daily, Disp: 90 tablet, Rfl: 1    levothyroxine 100 mcg tablet, Take 1 tablet (100 mcg total) by mouth daily, Disp: 90 tablet, Rfl: 1    losartan (Cozaar) 100 MG tablet, Take 1 tablet (100 mg total) by mouth daily, Disp: 90 tablet, Rfl: 1    cyclobenzaprine (FLEXERIL) 10 mg tablet, Take 1 tablet (10 mg total) by mouth daily at bedtime as needed for muscle spasms (Patient not taking: Reported on 12/30/2020), Disp: 30 tablet, Rfl: 0    meloxicam (MOBIC) 15 mg tablet, Take 1 tablet (15 mg total) by mouth daily as needed for mild pain or moderate pain (Patient not taking: Reported on 12/30/2020), Disp: 90 tablet, Rfl: 0    Allergies   Allergen Reactions    Amoxicillin Itching and Other (See Comments)     Other reaction(s): tingling    Penicillins Rash       Objective:  Vitals:    02/24/21 0859   BP: 162/75   Pulse: 65       Left Knee Exam     Tenderness   Left knee tenderness location: Lateral hamstring, proximal medial tibia      Range of Motion   Extension: 0   Flexion: 120     Tests   Nicole:  Medial - negative Lateral - negative  Varus: negative Valgus: positive  Drawer:  Anterior - negative     Posterior - negative    Other   Erythema: absent  Sensation: normal  Swelling: mild  Effusion: no effusion present          Observations   Left Knee   Negative for effusion  Physical Exam  Vitals signs reviewed  Constitutional:       Appearance: She is well-developed  HENT:      Head: Normocephalic and atraumatic  Eyes:      General:         Right eye: No discharge  Left eye: No discharge  Conjunctiva/sclera: Conjunctivae normal    Neck:      Musculoskeletal: Normal range of motion and neck supple  Cardiovascular:      Rate and Rhythm: Regular rhythm  Pulmonary:      Effort: Pulmonary effort is normal  No respiratory distress  Breath sounds: No stridor  Musculoskeletal:      Left knee: She exhibits no effusion  Skin:     General: Skin is warm and dry  Neurological:      Mental Status: She is alert and oriented to person, place, and time  Psychiatric:         Behavior: Behavior normal          I have personally reviewed pertinent films in PACS and my interpretation is as follows:  X-rays of the left knee demonstrate severe medial compartment osteoarthritis  There is no evidence of acute fracture or other osseous abnormality

## 2021-02-24 NOTE — PATIENT INSTRUCTIONS
Knee Exercises   AMBULATORY CARE:   What you need to know about knee exercises:  Knee exercises help strengthen the muscles around your knee  Strong muscles can help reduce pain and decrease your risk of future injury  Knee exercises also help you heal after an injury or surgery  · Start slow  These are beginning exercises  Ask your healthcare provider if you need to see a physical therapist for more advanced exercises  As you get stronger, you may be able to do more sets of each exercise or add weights  · Stop if you feel pain  It is normal to feel some discomfort at first  Regular exercise will help decrease your discomfort over time  · Do the exercises on both legs  Do this so both knees remain strong  · Warm up before you do knee exercises  Walk or ride a stationary bike for 5 or 10 minutes to warm your muscles  How to perform knee stretches safely:  Always stretch before you do strengthening exercises  Do these stretching exercises again after you do the strengthening exercises  Do these stretches 4 or 5 days a week, or as directed  · Standing calf stretch: Face a wall and place both palms flat on the wall, or hold the back of a chair for balance  Keep a slight bend in your knees  Take a big step backward with one leg  Keep your other leg directly under you  Keep both heels flat and press your hips forward  Hold the stretch for 30 seconds, and then relax for 30 seconds  Switch legs  Repeat 2 or 3 times on each leg  · Standing quadriceps stretch:  Stand and place one hand against a wall or hold the back of a chair for balance  With your weight on one leg, bend your other leg and grab your ankle  Bring your heel toward your buttocks  Hold the stretch for 30 to 60 seconds  Switch legs  Repeat 2 or 3 times on each leg  · Sitting hamstring stretch:  Sit with both legs straight in front of you  Do not point or flex your toes   Place your palms on the floor and slide your hands forward until you feel the stretch  Do not round your back  Hold the stretch for 30 seconds  Repeat 2 or 3 times  How to perform knee strengthening exercises safely:  Do these exercises 4 or 5 days a week, or as directed  · Standing half squats:  Stand with your feet shoulder-width apart  Lean your back against a wall or hold the back of a chair for balance, if needed  Slowly sit down about 10 inches, as if you are going to sit in a chair  Your body weight should be mostly over your heels  Hold the squat for 5 seconds, then rise to a standing position  Do 3 sets of 10 squats to strengthen your buttocks and thighs  · Standing hamstring curls: Face a wall and place both palms flat on the wall, or hold the back of a chair for balance  With your weight on one leg, lift your other foot as close to your buttocks as you can  Hold for 5 seconds and then lower your leg  Do 2 sets of 10 curls on each leg  This exercise strengthens the muscles in the back of your thigh  · Standing calf raises:  Face a wall and place both palms flat on the wall, or hold the back of a chair for balance  Stand up straight, and do not lean  Place all your weight on one leg by lifting the other foot off the floor  Raise the heel of the foot that is on the floor as high as you can and then lower it  Do 2 sets of 10 calf raises on each leg to strengthen your calf muscles  · Straight leg lifts:  Lie on your stomach with straight legs  Fold your arms in front of you and rest your head in your arms  Tighten your leg muscles and raise one leg as high as you can  Hold for 5 seconds, then lower your leg  Do 2 sets of 10 lifts on each leg to strengthen your buttocks  · Sitting leg lifts:  Sit in a chair  Slowly straighten and raise one leg  Squeeze your thigh muscles and hold for 5 seconds  Relax and return your foot to the floor  Do 2 sets of 10 lifts on each leg   This helps strengthen the muscles in the front of your thigh  Contact your healthcare provider if:   · You have new pain or your pain becomes worse  · You have questions or concerns about your condition or care  © Copyright [address] Information is for End User's use only and may not be sold, redistributed or otherwise used for commercial purposes  All illustrations and images included in CareNotes® are the copyrighted property of A D A M , Inc  or [address] [place], [place] [address]devan Cook   The above information is an  only  It is not intended as medical advice for individual conditions or treatments  Talk to your doctor, nurse or pharmacist before following any medical regimen to see if it is safe and effective for you

## 2021-03-01 DIAGNOSIS — Z23 ENCOUNTER FOR IMMUNIZATION: ICD-10-CM

## 2021-03-04 ENCOUNTER — IMMUNIZATIONS (OUTPATIENT)
Dept: FAMILY MEDICINE CLINIC | Facility: HOSPITAL | Age: 76
End: 2021-03-04

## 2021-03-04 DIAGNOSIS — Z23 ENCOUNTER FOR IMMUNIZATION: Primary | ICD-10-CM

## 2021-03-04 PROCEDURE — 0011A SARS-COV-2 / COVID-19 MRNA VACCINE (MODERNA) 100 MCG: CPT

## 2021-03-04 PROCEDURE — 91301 SARS-COV-2 / COVID-19 MRNA VACCINE (MODERNA) 100 MCG: CPT

## 2021-04-02 ENCOUNTER — IMMUNIZATIONS (OUTPATIENT)
Dept: FAMILY MEDICINE CLINIC | Facility: HOSPITAL | Age: 76
End: 2021-04-02

## 2021-04-02 DIAGNOSIS — E03.9 HYPOTHYROIDISM, UNSPECIFIED TYPE: ICD-10-CM

## 2021-04-02 DIAGNOSIS — Z23 ENCOUNTER FOR IMMUNIZATION: Primary | ICD-10-CM

## 2021-04-02 PROCEDURE — 0012A SARS-COV-2 / COVID-19 MRNA VACCINE (MODERNA) 100 MCG: CPT

## 2021-04-02 PROCEDURE — 91301 SARS-COV-2 / COVID-19 MRNA VACCINE (MODERNA) 100 MCG: CPT

## 2021-04-02 RX ORDER — LEVOTHYROXINE SODIUM 0.1 MG/1
100 TABLET ORAL DAILY
Qty: 90 TABLET | Refills: 1 | Status: SHIPPED | OUTPATIENT
Start: 2021-04-02 | End: 2021-09-21

## 2021-04-19 NOTE — PROGRESS NOTES
Subjective      [place] is a 77 y o  female who presents for annual exam   Last Pap smear possibly in   Reviewed Pap smear  Will collect Pap smear today given >14 years without gyn care and unsure history of Pap smear results  Last mammogram 21 resulted BiRads 1  CRC screening Cologuard 2019- WNL per patient  Last DEXA scan 21 resulted osteopenia  The patient has no complaints today  The patient is not currently sexually active  The patient is not taking hormone replacement therapy  Patient denies post-menopausal vaginal bleeding    The patient wears seatbelts: yes  The patient participates in regular exercise: yes  The patient reports that there is not domestic violence in her life  Menstrual History:  OB History        4    Para   3    Term   3            AB   1    Living   3       SAB        TAB        Ectopic   1    Multiple        Live Births   3           Obstetric Comments   3             Menarche age: unsure   No LMP recorded  Patient is postmenopausal        The following portions of the patient's history were reviewed and updated as appropriate: allergies, current medications, past family history, past medical history, past social history, past surgical history and problem list     Review of Systems  Pertinent items are noted in HPI       Objective      /90 (BP Location: Right arm, Patient Position: Sitting, Cuff Size: Standard)   Temp 97 7 °F (36 5 °C) (Temporal)   Ht 5' 1" (1 549 m)   Wt 76 7 kg (169 lb)   BMI 31 93 kg/m²     General:   alert and oriented, in no acute distress   Heart: regular rate and rhythm, S1, S2 normal, no murmur, click, rub or gallop   Lungs: clear to auscultation bilaterally   Abdomen: soft, non-tender, without masses or organomegaly, nondistended and normal bowel sounds   Vulva: normal, Bartholin's, Urethra, [place]'s normal   Vagina:  pale thin mucosa, normal discharge, no palpable nodules   Cervix: no cervical motion tenderness and no lesions   Uterus: normal size, non-tender, normal shape and consistency   Adnexa: normal adnexa and no mass, fullness, tenderness   Breast:  breasts appear normal, no suspicious masses, no skin or nipple changes or axillary nodes  Assessment/Plan     Diagnoses and all orders for this visit:    Well woman exam with routine gynecological exam  -     IGP, Aptima HPV        Encouraged healthy diet, exercise and lifestyle  Encouraged follow-up with PCP and specialists as needed  Had seasonal influenza vaccination  Encouraged supplementation with calcium, vitamin-D and strength training exercises for good bone health  Encouraged social distancing, good hand hygiene, masking and avoiding crowds  Written information provided and COVID-19  Had vaccine x2  Will call with results  VBI-   BMI Counseling: Body mass index is 31 93 kg/m²  The BMI is above normal  Nutrition recommendations include reducing portion sizes, decreasing overall calorie intake and 3-5 servings of fruits/vegetables daily  Exercise recommendations include exercising 3-5 times per week, joining a gym and strength training exercises

## 2021-04-20 ENCOUNTER — OFFICE VISIT (OUTPATIENT)
Dept: OBGYN CLINIC | Facility: CLINIC | Age: 76
End: 2021-04-20

## 2021-04-20 VITALS
WEIGHT: 169 LBS | SYSTOLIC BLOOD PRESSURE: 158 MMHG | HEIGHT: 61 IN | BODY MASS INDEX: 31.91 KG/M2 | TEMPERATURE: 97.7 F | DIASTOLIC BLOOD PRESSURE: 90 MMHG

## 2021-04-20 DIAGNOSIS — Z01.419 WELL WOMAN EXAM WITH ROUTINE GYNECOLOGICAL EXAM: Primary | ICD-10-CM

## 2021-04-20 PROCEDURE — S0610 ANNUAL GYNECOLOGICAL EXAMINA: HCPCS | Performed by: NURSE PRACTITIONER

## 2021-04-20 NOTE — PATIENT INSTRUCTIONS
Breast Self Exam for Women   WHAT YOU NEED TO KNOW:   What is a breast self-exam (BSE)? A BSE is a way to check your breasts for lumps and other changes  Regular BSEs can help you know how your breasts normally look and feel  Most breast lumps or changes are not cancer, but you should always have them checked by a healthcare provider  Your healthcare provider can also watch you do a BSE and can tell you if you are doing your BSE correctly  Why should I do a BSE? Breast cancer is the most common type of cancer in women  Even if you have mammograms, you may still want to do a BSE regularly  If you know how your breasts normally feel and look, it may help you know when to contact your healthcare provider  Mammograms can miss some cancers  You may find a lump during a BSE that did not show up on a mammogram   When should I do a BSE? If you have periods, you may want to do your BSE 1 week after your period ends  This is the time when your breasts may be the least swollen, lumpy, or tender  You can do regular BSEs even if you are breastfeeding or have breast implants  How should I do a BSE? · Look at your breasts in a mirror  Look at the size and shape of each breast and nipple  Check for swelling, lumps, dimpling, scaly skin, or other skin changes  Look for nipple changes, such as a nipple that is painful or beginning to pull inward  Gently squeeze both nipples and check to see if fluid (that is not breast milk) comes out of them  If you find any of these or other breast changes, contact your healthcare provider  Check your breasts while you sit or  the following 3 positions:    ? Hang your arms down at your sides  ? Raise your hands and join them behind your head  ? Put firm pressure with your hands on your hips  Bend slightly forward while you look at your breasts in the mirror  · Lie down and feel your breasts    When you lie down, your breast tissue spreads out evenly over your chest  This makes it easier for you to feel for lumps and anything that may not be normal for your breasts  Do a BSE on one breast at a time  ? Place a small pillow or towel under your left shoulder  Put your left arm behind your head  ? Use the 3 middle fingers of your right hand  Use your fingertip pads, on the top of your fingers  Your fingertip pad is the most sensitive part of your finger  ? Use small circles to feel your breast tissue  Use your fingertip pads to make dime-sized, overlapping circles on your breast and armpits  Use light, medium, and firm pressure  First, press lightly  Second, press with medium pressure to feel a little deeper into the breast  Last, use firm pressure to feel deep within your breast     ? Examine your entire breast area  Examine the breast area from above the breast to below the breast where you feel only ribs  Make small circles with your fingertips, starting in the middle of your armpit  Make circles going up and down the breast area  Continue toward your breast and all the way across it  Examine the area from your armpit all the way over to the middle of your chest (breastbone)  Stop at the middle of your chest     ? Move the pillow or towel to your right shoulder, and put your right arm behind your head  Use the 3 fingertip pads of your left hand, and repeat the above steps to do a BSE on your right breast   What else can I do to check for breast problems or cancer? Talk to your healthcare provider about mammograms  A mammogram is an x-ray of your breasts to screen for breast cancer or other problems  Your provider can tell you the benefits and risks of mammograms  The first mammogram is usually at age 39 or 48  Your provider may recommend you start at 36 or younger if your risk for breast cancer is high  Mammograms usually continue every 1 to 2 years until age 76  When should I call my doctor? · You find any lumps or changes in your breasts      · You have breast pain or fluid coming from your nipples  · You have questions or concerns or concerns about your condition or care  CARE AGREEMENT:   You have the right to help plan your care  Learn about your health condition and how it may be treated  Discuss treatment options with your healthcare providers to decide what care you want to receive  You always have the right to refuse treatment  The above information is an  only  It is not intended as medical advice for individual conditions or treatments  Talk to your doctor, nurse or pharmacist before following any medical regimen to see if it is safe and effective for you  © Copyright [address] Information is for End User's use only and may not be sold, redistributed or otherwise used for commercial purposes  All illustrations and images included in CareNotes® are the copyrighted property of A D A M , Inc  or [facility] Petrona Cook   Pap Smear   WHAT YOU NEED TO KNOW:   What do I need to know about a Pap smear? A Pap smear, or Pap test, is used to screen for cervical cancer  It is also used to find precancerous and cancerous cells of the vulva and vagina  How do I prepare for a Pap smear? The best time to schedule the test is right after your period stops  Do not have a Pap smear during your monthly period  What will happen during a Pap smear? · You will lie on your back and place your feet on footrests called stirrups  Your healthcare provider will gently insert a device called a speculum into your vagina  The speculum is used to open the walls of your vagina so your provider can see your cervix  · Your provider will gently take cell samples from your cervix and vagina  The samples are placed in a container with liquid or on a glass slide  They are sent to a lab and examined for abnormal cells  A test for human papillomavirus (HPV) may be done at the same time   HPV is a sexually transmitted virus that can cause changes in cervical cells     What will happen after a Pap smear? You may have some spotting the day of your procedure  What do my test results mean? Your healthcare provider will tell you when you can expect your Pap smear results  It usually takes about 1 to 3 weeks  · Normal results  mean that no cell changes were found on your cervix  You may be able to wait 3 to 5 years for your next Pap smear exam     · Unclear results  may mean they did not get a good sample of cervical tissue  Or, it may mean there are changes in your cells that look abnormal  This could be due to an infection, pregnancy, menopause, or HPV  You may need another Pap smear in 1 year  Your healthcare provider will tell you what to do next  · Abnormal results  mean that cell changes were found on your cervix  This does not mean you have cervical cancer  It could mean you have inflammation or an infection  It could also mean you have HPV or cells that might develop into cancer  Your healthcare provider will explain the abnormal test result to you and go over next steps with you  He or she may recommend a colposcopy  During this procedure, a small scope with a light is used to look more closely at your cervix and vagina  How often do I need to get a Pap smear? Pap smears usually start at age 24 and continue until age 72  A Pap smear alone may be done every 3 years  An HPV test alone or with a Pap smear may be done every 5 years, starting at age 27  You may need Pap smears more often or continuing after age 72 if you have any of the following:  · Abnormal Pap smear result    · Positive HPV test result    · A history of cervical cancer, or a high risk for cervical cancer    · HIV    · A weak immune system    · Exposure to diethylstilbestrol (HERNANDO) medicine when your mother was pregnant with you    When should I call my doctor? · You have severe bleeding  · It has been 3 weeks and you do not have test results      · You have questions or concerns about your condition or care  CARE AGREEMENT:   You have the right to help plan your care  Learn about your health condition and how it may be treated  Discuss treatment options with your healthcare providers to decide what care you want to receive  You always have the right to refuse treatment  The above information is an  only  It is not intended as medical advice for individual conditions or treatments  Talk to your doctor, nurse or pharmacist before following any medical regimen to see if it is safe and effective for you  © Copyright [address] Information is for End User's use only and may not be sold, redistributed or otherwise used for commercial purposes   All illustrations and images included in CareNotes® are the copyrighted property of [employer]  or [address] [place] [address]

## 2021-04-22 LAB
CYTOLOGIST CVX/VAG CYTO: NORMAL
DX ICD CODE: NORMAL
HPV I/H RISK 4 DNA CVX QL PROBE+SIG AMP: NEGATIVE
OTHER STN SPEC: NORMAL
PATH REPORT.FINAL DX SPEC: NORMAL
SL AMB NOTE:: NORMAL
SL AMB SPECIMEN ADEQUACY: NORMAL
SL AMB TEST METHODOLOGY: NORMAL

## 2021-05-11 DIAGNOSIS — I10 BENIGN ESSENTIAL HYPERTENSION: ICD-10-CM

## 2021-05-11 RX ORDER — ATENOLOL 25 MG/1
25 TABLET ORAL DAILY
Qty: 180 TABLET | Refills: 1 | Status: SHIPPED | OUTPATIENT
Start: 2021-05-11 | End: 2021-05-25 | Stop reason: SDUPTHER

## 2021-05-25 DIAGNOSIS — E78.5 HYPERLIPIDEMIA LDL GOAL <130: ICD-10-CM

## 2021-05-25 DIAGNOSIS — I10 BENIGN ESSENTIAL HYPERTENSION: ICD-10-CM

## 2021-05-25 RX ORDER — ATENOLOL 25 MG/1
25 TABLET ORAL DAILY
Qty: 180 TABLET | Refills: 1 | Status: SHIPPED | OUTPATIENT
Start: 2021-05-25 | End: 2022-08-08

## 2021-05-25 RX ORDER — ATORVASTATIN CALCIUM 20 MG/1
20 TABLET, FILM COATED ORAL DAILY
Qty: 90 TABLET | Refills: 1 | Status: SHIPPED | OUTPATIENT
Start: 2021-05-25 | End: 2021-08-23 | Stop reason: SDUPTHER

## 2021-05-25 RX ORDER — HYDROCHLOROTHIAZIDE 12.5 MG/1
12.5 TABLET ORAL DAILY
Qty: 90 TABLET | Refills: 1 | Status: SHIPPED | OUTPATIENT
Start: 2021-05-25 | End: 2021-08-23 | Stop reason: SDUPTHER

## 2021-05-25 RX ORDER — LOSARTAN POTASSIUM 100 MG/1
100 TABLET ORAL DAILY
Qty: 90 TABLET | Refills: 1 | Status: SHIPPED | OUTPATIENT
Start: 2021-05-25 | End: 2021-08-23 | Stop reason: SDUPTHER

## 2021-06-15 LAB
ALBUMIN SERPL-MCNC: 4.5 G/DL (ref 3.7–4.7)
ALBUMIN/GLOB SERPL: 1.5 {RATIO} (ref 1.2–2.2)
ALP SERPL-CCNC: 85 IU/L (ref 48–121)
ALT SERPL-CCNC: 15 IU/L (ref 0–32)
AST SERPL-CCNC: 19 IU/L (ref 0–40)
BILIRUB SERPL-MCNC: 0.5 MG/DL (ref 0–1.2)
BUN SERPL-MCNC: 13 MG/DL (ref 8–27)
BUN/CREAT SERPL: 15 (ref 12–28)
CALCIUM SERPL-MCNC: 9.8 MG/DL (ref 8.7–10.3)
CHLORIDE SERPL-SCNC: 101 MMOL/L (ref 96–106)
CHOLEST SERPL-MCNC: 155 MG/DL (ref 100–199)
CO2 SERPL-SCNC: 27 MMOL/L (ref 20–29)
CREAT SERPL-MCNC: 0.85 MG/DL (ref 0.57–1)
GLOBULIN SER-MCNC: 3 G/DL (ref 1.5–4.5)
GLUCOSE SERPL-MCNC: 112 MG/DL (ref 65–99)
HBA1C MFR BLD: 5.8 % (ref 4.8–5.6)
HDLC SERPL-MCNC: 45 MG/DL
LDLC SERPL CALC-MCNC: 91 MG/DL (ref 0–99)
MICRODELETION SYND BLD/T FISH: NORMAL
POTASSIUM SERPL-SCNC: 5.3 MMOL/L (ref 3.5–5.2)
PROT SERPL-MCNC: 7.5 G/DL (ref 6–8.5)
SL AMB EGFR AFRICAN AMERICAN: 78 ML/MIN/1.73
SL AMB EGFR NON AFRICAN AMERICAN: 68 ML/MIN/1.73
SODIUM SERPL-SCNC: 139 MMOL/L (ref 134–144)
TRIGL SERPL-MCNC: 102 MG/DL (ref 0–149)
TSH SERPL DL<=0.005 MIU/L-ACNC: 0.41 UIU/ML (ref 0.45–4.5)

## 2021-06-28 DIAGNOSIS — I10 BENIGN ESSENTIAL HYPERTENSION: ICD-10-CM

## 2021-06-28 RX ORDER — AMLODIPINE BESYLATE 5 MG/1
5 TABLET ORAL DAILY
Qty: 90 TABLET | Refills: 1 | Status: SHIPPED | OUTPATIENT
Start: 2021-06-28 | End: 2021-12-27 | Stop reason: SDUPTHER

## 2021-06-30 ENCOUNTER — OFFICE VISIT (OUTPATIENT)
Dept: FAMILY MEDICINE CLINIC | Facility: CLINIC | Age: 76
End: 2021-06-30
Payer: COMMERCIAL

## 2021-06-30 VITALS
RESPIRATION RATE: 16 BRPM | BODY MASS INDEX: 31.34 KG/M2 | TEMPERATURE: 97 F | HEIGHT: 61 IN | WEIGHT: 166 LBS | DIASTOLIC BLOOD PRESSURE: 74 MMHG | HEART RATE: 68 BPM | SYSTOLIC BLOOD PRESSURE: 132 MMHG

## 2021-06-30 DIAGNOSIS — R73.03 PREDIABETES: ICD-10-CM

## 2021-06-30 DIAGNOSIS — E66.9 OBESITY (BMI 30-39.9): ICD-10-CM

## 2021-06-30 DIAGNOSIS — Z12.31 BREAST CANCER SCREENING BY MAMMOGRAM: ICD-10-CM

## 2021-06-30 DIAGNOSIS — I10 BENIGN ESSENTIAL HYPERTENSION: Primary | ICD-10-CM

## 2021-06-30 DIAGNOSIS — E78.5 HYPERLIPIDEMIA LDL GOAL <130: ICD-10-CM

## 2021-06-30 DIAGNOSIS — E03.9 HYPOTHYROIDISM, UNSPECIFIED TYPE: ICD-10-CM

## 2021-06-30 PROBLEM — H69.91 ETD (EUSTACHIAN TUBE DYSFUNCTION), RIGHT: Status: RESOLVED | Noted: 2020-01-14 | Resolved: 2021-06-30

## 2021-06-30 PROBLEM — H69.81 ETD (EUSTACHIAN TUBE DYSFUNCTION), RIGHT: Status: RESOLVED | Noted: 2020-01-14 | Resolved: 2021-06-30

## 2021-06-30 PROBLEM — R07.81 RIB PAIN ON RIGHT SIDE: Status: RESOLVED | Noted: 2020-10-09 | Resolved: 2021-06-30

## 2021-06-30 PROBLEM — Z12.39 BREAST CANCER SCREENING: Status: RESOLVED | Noted: 2018-06-18 | Resolved: 2021-06-30

## 2021-06-30 PROCEDURE — 1036F TOBACCO NON-USER: CPT | Performed by: FAMILY MEDICINE

## 2021-06-30 PROCEDURE — 3008F BODY MASS INDEX DOCD: CPT | Performed by: FAMILY MEDICINE

## 2021-06-30 PROCEDURE — 99214 OFFICE O/P EST MOD 30 MIN: CPT | Performed by: FAMILY MEDICINE

## 2021-06-30 PROCEDURE — 3075F SYST BP GE 130 - 139MM HG: CPT | Performed by: FAMILY MEDICINE

## 2021-06-30 PROCEDURE — 1160F RVW MEDS BY RX/DR IN RCRD: CPT | Performed by: FAMILY MEDICINE

## 2021-06-30 PROCEDURE — 3078F DIAST BP <80 MM HG: CPT | Performed by: FAMILY MEDICINE

## 2021-06-30 NOTE — PROGRESS NOTES
Assessment/Plan:    No problem-specific Assessment & Plan notes found for this encounter  Htn/hld/hypothyroid stable  Work on diet and bmi  Prediabetes and risk aware     Diagnoses and all orders for this visit:    Benign essential hypertension  -     Comprehensive metabolic panel; Future    Hyperlipidemia LDL goal <130    Hypothyroidism, unspecified type  -     TSH, 3rd generation; Future    Prediabetes  -     Hemoglobin A1C; Future    Breast cancer screening by mammogram  -     Mammo screening bilateral w 3d & cad; Future    Obesity (BMI 30-39  9)    BMI 31 0-31 9,adult        Return in about 6 months (around 12/30/2021)  Subjective:      Patient ID: Fausto Stern is a 77 y o  female  Chief Complaint   Patient presents with    Follow-up     [facility] lpn       HPI  Taking all meds  Some wt loss  No exercise program, very active  Low fat/salt diet [place]ola  Likes pizza occasionally  Not much carbs  Labs reviewed    The following portions of the patient's history were reviewed and updated as appropriate: allergies, current medications, past family history, past medical history, past social history, past surgical history and problem list     Review of Systems   Respiratory: Negative for shortness of breath  Cardiovascular: Negative for chest pain           Current Outpatient Medications   Medication Sig Dispense Refill    amLODIPine (NORVASC) 5 mg tablet Take 1 tablet (5 mg total) by mouth daily 90 tablet 1    atenolol (TENORMIN) 25 mg tablet Take 1 tablet (25 mg total) by mouth daily 180 tablet 1    atorvastatin (LIPITOR) 20 mg tablet Take 1 tablet (20 mg total) by mouth daily 90 tablet 1    hydrochlorothiazide (HYDRODIURIL) 12 5 mg tablet Take 1 tablet (12 5 mg total) by mouth daily 90 tablet 1    levothyroxine 100 mcg tablet Take 1 tablet (100 mcg total) by mouth daily 90 tablet 1    losartan (Cozaar) 100 MG tablet Take 1 tablet (100 mg total) by mouth daily 90 tablet 1    meloxicam (MOBIC) 15 mg tablet Take 1 tablet (15 mg total) by mouth daily as needed for mild pain or moderate pain (Patient not taking: Reported on 12/30/2020) 90 tablet 0     No current facility-administered medications for this visit  Objective:    /74   Pulse 68   Temp (!) 97 °F (36 1 °C)   Resp 16   Ht 5' 1" (1 549 m)   Wt 75 3 kg (166 lb)   BMI 31 37 kg/m²        Physical Exam  Vitals and nursing note reviewed  Constitutional:       Appearance: She is well-developed  She is obese  She is not ill-appearing  HENT:      Head: Normocephalic  Right Ear: Tympanic membrane normal       Left Ear: Tympanic membrane normal    Eyes:      General: No scleral icterus  Conjunctiva/sclera: Conjunctivae normal    Cardiovascular:      Rate and Rhythm: Normal rate and regular rhythm  Heart sounds: No murmur heard  Pulmonary:      Effort: Pulmonary effort is normal  No respiratory distress  Breath sounds: No wheezing  Abdominal:      Palpations: Abdomen is soft  Tenderness: There is no abdominal tenderness  Musculoskeletal:         General: No deformity  Cervical back: Neck supple  Skin:     General: Skin is warm and dry  Coloration: Skin is not pale  Neurological:      Mental Status: She is alert  Motor: No weakness  Gait: Gait normal    Psychiatric:         Mood and Affect: Mood normal          Behavior: Behavior normal          Thought Content:  Thought content normal                 Jewel Slade DO

## 2021-08-23 DIAGNOSIS — E78.5 HYPERLIPIDEMIA LDL GOAL <130: ICD-10-CM

## 2021-08-23 DIAGNOSIS — I10 BENIGN ESSENTIAL HYPERTENSION: ICD-10-CM

## 2021-08-23 RX ORDER — ATORVASTATIN CALCIUM 20 MG/1
20 TABLET, FILM COATED ORAL DAILY
Qty: 90 TABLET | Refills: 0 | Status: SHIPPED | OUTPATIENT
Start: 2021-08-23 | End: 2021-11-29 | Stop reason: SDUPTHER

## 2021-08-23 RX ORDER — LOSARTAN POTASSIUM 100 MG/1
100 TABLET ORAL DAILY
Qty: 90 TABLET | Refills: 0 | Status: SHIPPED | OUTPATIENT
Start: 2021-08-23 | End: 2021-11-29 | Stop reason: SDUPTHER

## 2021-08-23 RX ORDER — HYDROCHLOROTHIAZIDE 12.5 MG/1
12.5 TABLET ORAL DAILY
Qty: 90 TABLET | Refills: 0 | Status: SHIPPED | OUTPATIENT
Start: 2021-08-23 | End: 2021-11-29 | Stop reason: SDUPTHER

## 2021-09-21 DIAGNOSIS — E03.9 HYPOTHYROIDISM, UNSPECIFIED TYPE: ICD-10-CM

## 2021-09-21 RX ORDER — LEVOTHYROXINE SODIUM 0.1 MG/1
TABLET ORAL
Qty: 90 TABLET | Refills: 1 | Status: SHIPPED | OUTPATIENT
Start: 2021-09-21 | End: 2021-12-30 | Stop reason: SDUPTHER

## 2021-11-05 ENCOUNTER — IMMUNIZATIONS (OUTPATIENT)
Dept: FAMILY MEDICINE CLINIC | Facility: HOSPITAL | Age: 76
End: 2021-11-05

## 2021-11-05 DIAGNOSIS — Z23 ENCOUNTER FOR IMMUNIZATION: Primary | ICD-10-CM

## 2021-11-05 PROCEDURE — 91306 COVID-19 MODERNA VACC 0.25 ML BOOSTER: CPT

## 2021-11-05 PROCEDURE — 0013A COVID-19 MODERNA VACC 0.25 ML BOOSTER: CPT

## 2021-11-29 DIAGNOSIS — E78.5 HYPERLIPIDEMIA LDL GOAL <130: ICD-10-CM

## 2021-11-29 DIAGNOSIS — I10 BENIGN ESSENTIAL HYPERTENSION: ICD-10-CM

## 2021-11-29 RX ORDER — HYDROCHLOROTHIAZIDE 12.5 MG/1
12.5 TABLET ORAL DAILY
Qty: 90 TABLET | Refills: 1 | Status: SHIPPED | OUTPATIENT
Start: 2021-11-29 | End: 2022-05-31

## 2021-11-29 RX ORDER — ATORVASTATIN CALCIUM 20 MG/1
20 TABLET, FILM COATED ORAL DAILY
Qty: 90 TABLET | Refills: 1 | Status: SHIPPED | OUTPATIENT
Start: 2021-11-29 | End: 2022-05-31

## 2021-11-29 RX ORDER — LOSARTAN POTASSIUM 100 MG/1
100 TABLET ORAL DAILY
Qty: 90 TABLET | Refills: 1 | Status: SHIPPED | OUTPATIENT
Start: 2021-11-29 | End: 2022-05-31

## 2021-12-15 LAB
ALBUMIN SERPL-MCNC: 4.3 G/DL (ref 3.7–4.7)
ALBUMIN/GLOB SERPL: 1.4 {RATIO} (ref 1.2–2.2)
ALP SERPL-CCNC: 84 IU/L (ref 44–121)
ALT SERPL-CCNC: 20 IU/L (ref 0–32)
AST SERPL-CCNC: 17 IU/L (ref 0–40)
BILIRUB SERPL-MCNC: 0.5 MG/DL (ref 0–1.2)
BUN SERPL-MCNC: 14 MG/DL (ref 8–27)
BUN/CREAT SERPL: 19 (ref 12–28)
CALCIUM SERPL-MCNC: 9.3 MG/DL (ref 8.7–10.3)
CHLORIDE SERPL-SCNC: 101 MMOL/L (ref 96–106)
CO2 SERPL-SCNC: 24 MMOL/L (ref 20–29)
CREAT SERPL-MCNC: 0.75 MG/DL (ref 0.57–1)
GLOBULIN SER-MCNC: 3 G/DL (ref 1.5–4.5)
GLUCOSE SERPL-MCNC: 103 MG/DL (ref 65–99)
HBA1C MFR BLD: 5.8 % (ref 4.8–5.6)
POTASSIUM SERPL-SCNC: 4.3 MMOL/L (ref 3.5–5.2)
PROT SERPL-MCNC: 7.3 G/DL (ref 6–8.5)
SL AMB EGFR AFRICAN AMERICAN: 91 ML/MIN/1.73
SL AMB EGFR NON AFRICAN AMERICAN: 79 ML/MIN/1.73
SODIUM SERPL-SCNC: 140 MMOL/L (ref 134–144)
TSH SERPL DL<=0.005 MIU/L-ACNC: 0.33 UIU/ML (ref 0.45–4.5)

## 2021-12-27 DIAGNOSIS — I10 BENIGN ESSENTIAL HYPERTENSION: ICD-10-CM

## 2021-12-27 RX ORDER — AMLODIPINE BESYLATE 5 MG/1
5 TABLET ORAL DAILY
Qty: 90 TABLET | Refills: 1 | Status: SHIPPED | OUTPATIENT
Start: 2021-12-27 | End: 2022-06-20 | Stop reason: SDUPTHER

## 2021-12-30 ENCOUNTER — OFFICE VISIT (OUTPATIENT)
Dept: FAMILY MEDICINE CLINIC | Facility: CLINIC | Age: 76
End: 2021-12-30
Payer: COMMERCIAL

## 2021-12-30 VITALS
BODY MASS INDEX: 31.91 KG/M2 | SYSTOLIC BLOOD PRESSURE: 124 MMHG | WEIGHT: 169 LBS | RESPIRATION RATE: 16 BRPM | HEART RATE: 64 BPM | HEIGHT: 61 IN | TEMPERATURE: 98.3 F | OXYGEN SATURATION: 97 % | DIASTOLIC BLOOD PRESSURE: 80 MMHG

## 2021-12-30 DIAGNOSIS — E03.9 HYPOTHYROIDISM, UNSPECIFIED TYPE: ICD-10-CM

## 2021-12-30 DIAGNOSIS — I10 BENIGN ESSENTIAL HYPERTENSION: ICD-10-CM

## 2021-12-30 DIAGNOSIS — R73.03 PREDIABETES: ICD-10-CM

## 2021-12-30 DIAGNOSIS — E78.5 HYPERLIPIDEMIA LDL GOAL <130: ICD-10-CM

## 2021-12-30 DIAGNOSIS — M12.9 ARTHROPATHY: ICD-10-CM

## 2021-12-30 DIAGNOSIS — Z00.00 MEDICARE ANNUAL WELLNESS VISIT, SUBSEQUENT: Primary | ICD-10-CM

## 2021-12-30 PROCEDURE — G0439 PPPS, SUBSEQ VISIT: HCPCS | Performed by: FAMILY MEDICINE

## 2021-12-30 PROCEDURE — 99214 OFFICE O/P EST MOD 30 MIN: CPT | Performed by: FAMILY MEDICINE

## 2021-12-30 RX ORDER — LEVOTHYROXINE SODIUM 88 UG/1
88 TABLET ORAL DAILY
Qty: 90 TABLET | Refills: 1 | Status: SHIPPED | OUTPATIENT
Start: 2021-12-30 | End: 2022-06-20 | Stop reason: SDUPTHER

## 2022-02-14 LAB — TSH SERPL DL<=0.005 MIU/L-ACNC: 1.63 UIU/ML (ref 0.45–4.5)

## 2022-02-15 ENCOUNTER — OFFICE VISIT (OUTPATIENT)
Dept: FAMILY MEDICINE CLINIC | Facility: CLINIC | Age: 77
End: 2022-02-15
Payer: COMMERCIAL

## 2022-02-15 VITALS
BODY MASS INDEX: 32.1 KG/M2 | RESPIRATION RATE: 16 BRPM | WEIGHT: 170 LBS | SYSTOLIC BLOOD PRESSURE: 148 MMHG | TEMPERATURE: 97.6 F | HEIGHT: 61 IN | DIASTOLIC BLOOD PRESSURE: 80 MMHG | HEART RATE: 72 BPM | OXYGEN SATURATION: 98 %

## 2022-02-15 DIAGNOSIS — S39.012A STRAIN OF LUMBAR REGION, INITIAL ENCOUNTER: Primary | ICD-10-CM

## 2022-02-15 DIAGNOSIS — E78.5 HYPERLIPIDEMIA LDL GOAL <130: ICD-10-CM

## 2022-02-15 PROCEDURE — 3079F DIAST BP 80-89 MM HG: CPT | Performed by: FAMILY MEDICINE

## 2022-02-15 PROCEDURE — 1160F RVW MEDS BY RX/DR IN RCRD: CPT | Performed by: FAMILY MEDICINE

## 2022-02-15 PROCEDURE — 99213 OFFICE O/P EST LOW 20 MIN: CPT | Performed by: FAMILY MEDICINE

## 2022-02-15 PROCEDURE — 1036F TOBACCO NON-USER: CPT | Performed by: FAMILY MEDICINE

## 2022-02-15 PROCEDURE — 3077F SYST BP >= 140 MM HG: CPT | Performed by: FAMILY MEDICINE

## 2022-02-15 RX ORDER — OXYCODONE HYDROCHLORIDE AND ACETAMINOPHEN 5; 325 MG/1; MG/1
1 TABLET ORAL EVERY 6 HOURS PRN
Qty: 20 TABLET | Refills: 0 | Status: SHIPPED | OUTPATIENT
Start: 2022-02-15

## 2022-02-15 RX ORDER — CYCLOBENZAPRINE HCL 10 MG
10 TABLET ORAL
Qty: 30 TABLET | Refills: 0 | Status: SHIPPED | OUTPATIENT
Start: 2022-02-15

## 2022-02-15 RX ORDER — METHYLPREDNISOLONE 4 MG/1
TABLET ORAL
Qty: 21 TABLET | Refills: 0 | Status: SHIPPED | OUTPATIENT
Start: 2022-02-15 | End: 2022-02-21

## 2022-02-15 NOTE — PROGRESS NOTES
Assessment/Plan:    No problem-specific Assessment & Plan notes found for this encounter  Back strain  Dc advil  Use medrol  Tylenol prn  Flexeril with fall caution due to age  PT if no better  Opioid caution  Ice area  Prevention discussed    HLD stable     Diagnoses and all orders for this visit:    Strain of lumbar region, initial encounter  -     cyclobenzaprine (FLEXERIL) 10 mg tablet; Take 1 tablet (10 mg total) by mouth daily at bedtime as needed for muscle spasms  -     methylPREDNISolone 4 MG tablet therapy pack; Use as directed on package  -     oxyCODONE-acetaminophen (Percocet) 5-325 mg per tablet; Take 1 tablet by mouth every 6 (six) hours as needed for severe pain Max Daily Amount: 4 tablets    Hyperlipidemia LDL goal <130        No follow-ups on file  Subjective:      Patient ID: Muriel Nagy is a 77 y o  female  Chief Complaint   Patient presents with    pulled muscle left side       HPI  Was shoveling now  Pushed it mostly  About 45minutes  Raking a few days before that  Some twinges after shoveling that night  Worse the next day  mobic taken  Took some mobic and flexeril  About 4-5d ago  Right flank area mostly  All rom   Some trouble sleeping  No blood in urine  No direct trauma    The following portions of the patient's history were reviewed and updated as appropriate: allergies, current medications, past family history, past medical history, past social history, past surgical history and problem list     Review of Systems   Constitutional: Negative for fever  Genitourinary: Negative for hematuria           Current Outpatient Medications   Medication Sig Dispense Refill    amLODIPine (NORVASC) 5 mg tablet Take 1 tablet (5 mg total) by mouth daily 90 tablet 1    atenolol (TENORMIN) 25 mg tablet Take 1 tablet (25 mg total) by mouth daily 180 tablet 1    atorvastatin (LIPITOR) 20 mg tablet Take 1 tablet (20 mg total) by mouth daily 90 tablet 1    hydrochlorothiazide (HYDRODIURIL) 12 5 mg tablet Take 1 tablet (12 5 mg total) by mouth daily 90 tablet 1    levothyroxine 88 mcg tablet Take 1 tablet (88 mcg total) by mouth daily 90 tablet 1    losartan (Cozaar) 100 MG tablet Take 1 tablet (100 mg total) by mouth daily 90 tablet 1    meloxicam (MOBIC) 15 mg tablet Take 1 tablet (15 mg total) by mouth daily as needed for mild pain or moderate pain 90 tablet 0    cyclobenzaprine (FLEXERIL) 10 mg tablet Take 1 tablet (10 mg total) by mouth daily at bedtime as needed for muscle spasms 30 tablet 0    methylPREDNISolone 4 MG tablet therapy pack Use as directed on package 21 tablet 0    oxyCODONE-acetaminophen (Percocet) 5-325 mg per tablet Take 1 tablet by mouth every 6 (six) hours as needed for severe pain Max Daily Amount: 4 tablets 20 tablet 0     No current facility-administered medications for this visit  Objective:    /80   Pulse 72   Temp 97 6 °F (36 4 °C)   Resp 16   Ht 5' 1" (1 549 m)   Wt 77 1 kg (170 lb)   SpO2 98%   BMI 32 12 kg/m²        Physical Exam  Vitals and nursing note reviewed  Constitutional:       Appearance: She is well-developed  She is obese  She is not ill-appearing  HENT:      Head: Normocephalic  Right Ear: Tympanic membrane normal       Left Ear: Tympanic membrane normal    Eyes:      Conjunctiva/sclera: Conjunctivae normal    Cardiovascular:      Rate and Rhythm: Normal rate and regular rhythm  Heart sounds: No murmur heard  Pulmonary:      Effort: Pulmonary effort is normal  No respiratory distress  Breath sounds: No wheezing  Abdominal:      General: There is no distension  Palpations: Abdomen is soft  Tenderness: There is no abdominal tenderness  Musculoskeletal:         General: Tenderness present  No deformity  Cervical back: Neck supple  Comments: Right thoracolumbar area pain with rotation/flexion   Skin:     General: Skin is warm and dry  Coloration: Skin is not pale     Neurological: Mental Status: She is alert  Gait: Gait normal    Psychiatric:         Behavior: Behavior normal          Thought Content:  Thought content normal                 Jewel Slade DO

## 2022-03-08 ENCOUNTER — HOSPITAL ENCOUNTER (OUTPATIENT)
Dept: RADIOLOGY | Facility: HOSPITAL | Age: 77
Discharge: HOME | End: 2022-03-08
Attending: FAMILY MEDICINE
Payer: COMMERCIAL

## 2022-03-08 VITALS — BODY MASS INDEX: 30.12 KG/M2 | HEIGHT: 63 IN | WEIGHT: 170 LBS

## 2022-03-08 DIAGNOSIS — Z12.31 BREAST CANCER SCREENING BY MAMMOGRAM: ICD-10-CM

## 2022-03-08 PROCEDURE — 77063 BREAST TOMOSYNTHESIS BI: CPT

## 2022-03-08 PROCEDURE — 77067 SCR MAMMO BI INCL CAD: CPT

## 2022-05-30 DIAGNOSIS — I10 BENIGN ESSENTIAL HYPERTENSION: ICD-10-CM

## 2022-05-30 DIAGNOSIS — E78.5 HYPERLIPIDEMIA LDL GOAL <130: ICD-10-CM

## 2022-05-31 RX ORDER — ATORVASTATIN CALCIUM 20 MG/1
TABLET, FILM COATED ORAL
Qty: 90 TABLET | Refills: 1 | Status: SHIPPED | OUTPATIENT
Start: 2022-05-31

## 2022-05-31 RX ORDER — LOSARTAN POTASSIUM 100 MG/1
TABLET ORAL
Qty: 90 TABLET | Refills: 1 | Status: SHIPPED | OUTPATIENT
Start: 2022-05-31

## 2022-05-31 RX ORDER — HYDROCHLOROTHIAZIDE 12.5 MG/1
TABLET ORAL
Qty: 90 TABLET | Refills: 1 | Status: SHIPPED | OUTPATIENT
Start: 2022-05-31

## 2022-06-16 LAB
ALBUMIN SERPL-MCNC: 4.5 G/DL (ref 3.7–4.7)
ALBUMIN/GLOB SERPL: 1.9 {RATIO} (ref 1.2–2.2)
ALP SERPL-CCNC: 78 IU/L (ref 44–121)
ALT SERPL-CCNC: 16 IU/L (ref 0–32)
AST SERPL-CCNC: 17 IU/L (ref 0–40)
BILIRUB SERPL-MCNC: 0.6 MG/DL (ref 0–1.2)
BUN SERPL-MCNC: 14 MG/DL (ref 8–27)
BUN/CREAT SERPL: 16 (ref 12–28)
CALCIUM SERPL-MCNC: 9.7 MG/DL (ref 8.7–10.3)
CHLORIDE SERPL-SCNC: 99 MMOL/L (ref 96–106)
CHOLEST SERPL-MCNC: 152 MG/DL (ref 100–199)
CO2 SERPL-SCNC: 23 MMOL/L (ref 20–29)
CREAT SERPL-MCNC: 0.89 MG/DL (ref 0.57–1)
EGFR: 68 ML/MIN/1.73
GLOBULIN SER-MCNC: 2.4 G/DL (ref 1.5–4.5)
GLUCOSE SERPL-MCNC: 106 MG/DL (ref 65–99)
HBA1C MFR BLD: 5.6 % (ref 4.8–5.6)
HDLC SERPL-MCNC: 45 MG/DL
LDLC SERPL CALC-MCNC: 90 MG/DL (ref 0–99)
MICRODELETION SYND BLD/T FISH: NORMAL
POTASSIUM SERPL-SCNC: 4.7 MMOL/L (ref 3.5–5.2)
PROT SERPL-MCNC: 6.9 G/DL (ref 6–8.5)
SODIUM SERPL-SCNC: 137 MMOL/L (ref 134–144)
TRIGL SERPL-MCNC: 92 MG/DL (ref 0–149)
TSH SERPL DL<=0.005 MIU/L-ACNC: 1.43 UIU/ML (ref 0.45–4.5)

## 2022-06-20 DIAGNOSIS — E03.9 HYPOTHYROIDISM, UNSPECIFIED TYPE: ICD-10-CM

## 2022-06-20 DIAGNOSIS — I10 BENIGN ESSENTIAL HYPERTENSION: ICD-10-CM

## 2022-06-20 RX ORDER — AMLODIPINE BESYLATE 5 MG/1
5 TABLET ORAL DAILY
Qty: 90 TABLET | Refills: 0 | Status: SHIPPED | OUTPATIENT
Start: 2022-06-20

## 2022-06-20 RX ORDER — LEVOTHYROXINE SODIUM 88 UG/1
88 TABLET ORAL DAILY
Qty: 90 TABLET | Refills: 0 | Status: SHIPPED | OUTPATIENT
Start: 2022-06-20

## 2022-06-30 ENCOUNTER — OFFICE VISIT (OUTPATIENT)
Dept: FAMILY MEDICINE CLINIC | Facility: CLINIC | Age: 77
End: 2022-06-30
Payer: COMMERCIAL

## 2022-06-30 VITALS
DIASTOLIC BLOOD PRESSURE: 80 MMHG | HEART RATE: 62 BPM | OXYGEN SATURATION: 97 % | RESPIRATION RATE: 16 BRPM | TEMPERATURE: 97.4 F | SYSTOLIC BLOOD PRESSURE: 146 MMHG | BODY MASS INDEX: 29.77 KG/M2 | WEIGHT: 168 LBS | HEIGHT: 63 IN

## 2022-06-30 DIAGNOSIS — I10 BENIGN ESSENTIAL HYPERTENSION: Primary | ICD-10-CM

## 2022-06-30 DIAGNOSIS — R73.03 PREDIABETES: ICD-10-CM

## 2022-06-30 DIAGNOSIS — E03.9 HYPOTHYROIDISM, UNSPECIFIED TYPE: ICD-10-CM

## 2022-06-30 DIAGNOSIS — E78.5 HYPERLIPIDEMIA LDL GOAL <130: ICD-10-CM

## 2022-06-30 DIAGNOSIS — R73.01 IFG (IMPAIRED FASTING GLUCOSE): ICD-10-CM

## 2022-06-30 PROCEDURE — 99214 OFFICE O/P EST MOD 30 MIN: CPT | Performed by: FAMILY MEDICINE

## 2022-06-30 NOTE — PROGRESS NOTES
Assessment/Plan:    No problem-specific Assessment & Plan notes found for this encounter  Htn/hypothyroid/HLD/prediabetes stable  Continue meds  Work on diet  Labs q6m  salonpas on tender right flank area prn strain     Diagnoses and all orders for this visit:    Benign essential hypertension  -     Comprehensive metabolic panel; Future    Hypothyroidism, unspecified type  -     TSH, 3rd generation; Future    Hyperlipidemia LDL goal <130    IFG (impaired fasting glucose)  -     Hemoglobin A1C; Future    BMI 29 0-29 9,adult    Prediabetes          Return in about 6 months (around 12/21/2022) for Recheck  Subjective:      Patient ID: Jackie Guzmán is a 77 y o  female  Chief Complaint   Patient presents with    Follow-up     On meds,   Dania Tang MA        HPI  Wt same  Active  Low fa/salt  Does have carbs  Drinks water    Back pain  Comes and goes  Always same area  Yard work related and housework  Declines PT    The following portions of the patient's history were reviewed and updated as appropriate: allergies, current medications, past family history, past medical history, past social history, past surgical history and problem list     Review of Systems   Constitutional: Negative for chills and fever           Current Outpatient Medications   Medication Sig Dispense Refill    amLODIPine (NORVASC) 5 mg tablet Take 1 tablet (5 mg total) by mouth daily 90 tablet 0    atenolol (TENORMIN) 25 mg tablet Take 1 tablet (25 mg total) by mouth daily 180 tablet 1    atorvastatin (LIPITOR) 20 mg tablet take 1 tablet by mouth once daily 90 tablet 1    cyclobenzaprine (FLEXERIL) 10 mg tablet Take 1 tablet (10 mg total) by mouth daily at bedtime as needed for muscle spasms 30 tablet 0    hydrochlorothiazide (HYDRODIURIL) 12 5 mg tablet take 1 tablet by mouth once daily 90 tablet 1    levothyroxine 88 mcg tablet Take 1 tablet (88 mcg total) by mouth daily 90 tablet 0    losartan (COZAAR) 100 MG tablet take 1 tablet by mouth once daily 90 tablet 1    meloxicam (MOBIC) 15 mg tablet Take 1 tablet (15 mg total) by mouth daily as needed for mild pain or moderate pain 90 tablet 0    oxyCODONE-acetaminophen (Percocet) 5-325 mg per tablet Take 1 tablet by mouth every 6 (six) hours as needed for severe pain Max Daily Amount: 4 tablets (Patient not taking: Reported on 6/30/2022) 20 tablet 0     No current facility-administered medications for this visit  Objective:    /80   Pulse 62   Temp (!) 97 4 °F (36 3 °C)   Resp 16   Ht 5' 3" (1 6 m)   Wt 76 2 kg (168 lb)   SpO2 97%   BMI 29 76 kg/m²        Physical Exam  Vitals and nursing note reviewed  Constitutional:       General: She is not in acute distress  Appearance: She is well-developed  She is not ill-appearing  HENT:      Head: Normocephalic  Right Ear: Tympanic membrane normal       Left Ear: Tympanic membrane normal    Eyes:      General: No scleral icterus  Conjunctiva/sclera: Conjunctivae normal    Cardiovascular:      Rate and Rhythm: Normal rate and regular rhythm  Heart sounds: No murmur heard  Pulmonary:      Effort: Pulmonary effort is normal  No respiratory distress  Breath sounds: No wheezing  Abdominal:      General: There is no distension  Palpations: Abdomen is soft  Tenderness: There is no abdominal tenderness  There is no right CVA tenderness or left CVA tenderness  Comments: Right flank focal tenderness with rom   Musculoskeletal:         General: No deformity  Cervical back: Neck supple  Skin:     General: Skin is warm and dry  Coloration: Skin is not pale  Neurological:      Mental Status: She is alert  Motor: No weakness  Gait: Gait normal    Psychiatric:         Mood and Affect: Mood normal          Behavior: Behavior normal          Thought Content: Thought content normal        BMI Counseling: Body mass index is 29 76 kg/m²   The BMI is above normal  Nutrition recommendations include decreasing portion sizes and moderation in carbohydrate intake  Exercise recommendations include exercising 3-5 times per week  No pharmacotherapy was ordered  Rationale for BMI follow-up plan is due to patient being overweight or obese  Depression Screening and Follow-up Plan: Patient was screened for depression during today's encounter  They screened negative with a PHQ-2 score of 0               Kim Vallecillo DO

## 2022-07-08 ENCOUNTER — OFFICE VISIT (OUTPATIENT)
Dept: FAMILY MEDICINE CLINIC | Facility: CLINIC | Age: 77
End: 2022-07-08
Payer: COMMERCIAL

## 2022-07-08 VITALS
RESPIRATION RATE: 16 BRPM | BODY MASS INDEX: 29.59 KG/M2 | HEART RATE: 82 BPM | DIASTOLIC BLOOD PRESSURE: 68 MMHG | SYSTOLIC BLOOD PRESSURE: 126 MMHG | OXYGEN SATURATION: 96 % | TEMPERATURE: 97.5 F | WEIGHT: 167 LBS | HEIGHT: 63 IN

## 2022-07-08 DIAGNOSIS — R21 RASH: ICD-10-CM

## 2022-07-08 DIAGNOSIS — R10.9 RIGHT FLANK PAIN: Primary | ICD-10-CM

## 2022-07-08 LAB
SL AMB  POCT GLUCOSE, UA: NEGATIVE
SL AMB LEUKOCYTE ESTERASE,UA: NEGATIVE
SL AMB POCT BILIRUBIN,UA: NEGATIVE
SL AMB POCT BLOOD,UA: ABNORMAL
SL AMB POCT CLARITY,UA: CLEAR
SL AMB POCT COLOR,UA: YELLOW
SL AMB POCT KETONES,UA: NEGATIVE
SL AMB POCT NITRITE,UA: NEGATIVE
SL AMB POCT PH,UA: 7
SL AMB POCT SPECIFIC GRAVITY,UA: 1.02
SL AMB POCT URINE PROTEIN: NEGATIVE
SL AMB POCT UROBILINOGEN: 0.2

## 2022-07-08 PROCEDURE — 3078F DIAST BP <80 MM HG: CPT | Performed by: FAMILY MEDICINE

## 2022-07-08 PROCEDURE — 81003 URINALYSIS AUTO W/O SCOPE: CPT | Performed by: FAMILY MEDICINE

## 2022-07-08 PROCEDURE — 1160F RVW MEDS BY RX/DR IN RCRD: CPT | Performed by: FAMILY MEDICINE

## 2022-07-08 PROCEDURE — 3074F SYST BP LT 130 MM HG: CPT | Performed by: FAMILY MEDICINE

## 2022-07-08 PROCEDURE — 3725F SCREEN DEPRESSION PERFORMED: CPT | Performed by: FAMILY MEDICINE

## 2022-07-08 PROCEDURE — 99214 OFFICE O/P EST MOD 30 MIN: CPT | Performed by: FAMILY MEDICINE

## 2022-07-08 RX ORDER — VALACYCLOVIR HYDROCHLORIDE 1 G/1
1000 TABLET, FILM COATED ORAL 3 TIMES DAILY
Qty: 21 TABLET | Refills: 0 | Status: SHIPPED | OUTPATIENT
Start: 2022-07-08 | End: 2022-07-15

## 2022-07-08 NOTE — PROGRESS NOTES
Assessment/Plan:       Diagnoses and all orders for this visit:    Right flank pain  -     POCT urine dip auto non-scope with no abnormalities concerning for urine infection  Rash  - She has a rash along the right flank where she reports burning pain  Possible shingles  Will start valACYclovir (VALTREX) 1,000 mg tablet; Take 1 tablet (1,000 mg total) by mouth 3 (three) times a day for 7 days  - She has meloxicam, tylenol, muscle relaxer at home for pain  - Aware to call if symptoms worsen or fail to improve  Subjective:      Patient ID: Mal Pena is a 77 y o  female  HPI  Crickettam Ruby presents today for sharp, burning pain on the right flank region  Began 2 weeks ago  Unchanged  Has tried putting a heating pad, using flexeril and topical pain cream with no relief  She denies fevers, chills, urinary frequency, urgency, hematuria, pelvic pain, nausea, vomiting  The following portions of the patient's history were reviewed and updated as appropriate: allergies, current medications, past family history, past medical history, past social history, past surgical history and problem list     Review of Systems   Constitutional: Negative  HENT: Negative  Eyes: Negative  Respiratory: Negative  Cardiovascular: Negative  Gastrointestinal: Negative  Endocrine: Negative  Genitourinary: Positive for flank pain  Skin: Positive for rash  Allergic/Immunologic: Negative  Neurological: Negative  Hematological: Negative  Psychiatric/Behavioral: Negative  Objective:      /68   Pulse 82   Temp 97 5 °F (36 4 °C)   Resp 16   Ht 5' 3" (1 6 m)   Wt 75 8 kg (167 lb)   SpO2 96%   BMI 29 58 kg/m²          Physical Exam  Constitutional:       General: She is not in acute distress  Appearance: She is well-developed  She is not diaphoretic  HENT:      Head: Normocephalic and atraumatic  Cardiovascular:      Rate and Rhythm: Normal rate and regular rhythm  Heart sounds: Normal heart sounds  No murmur heard  No friction rub  No gallop  Pulmonary:      Effort: Pulmonary effort is normal  No respiratory distress  Breath sounds: Normal breath sounds  No wheezing or rales  Chest:      Chest wall: No tenderness  Abdominal:      General: Abdomen is flat  Bowel sounds are normal  There is no distension  Palpations: Abdomen is soft  There is no mass  Tenderness: There is no abdominal tenderness  There is no right CVA tenderness, left CVA tenderness, guarding or rebound  Hernia: No hernia is present  Musculoskeletal:         General: No deformity  Normal range of motion  Cervical back: Normal range of motion and neck supple  Skin:     General: Skin is warm and dry  Findings: Rash (erythematous lesions along right flank region as noted in image below) present  Neurological:      Mental Status: She is alert and oriented to person, place, and time  Psychiatric:         Behavior: Behavior normal          Thought Content:  Thought content normal          Judgment: Judgment normal

## 2022-08-07 DIAGNOSIS — I10 BENIGN ESSENTIAL HYPERTENSION: ICD-10-CM

## 2022-08-08 RX ORDER — ATENOLOL 25 MG/1
TABLET ORAL
Qty: 180 TABLET | Refills: 1 | Status: SHIPPED | OUTPATIENT
Start: 2022-08-08

## 2022-09-19 DIAGNOSIS — I10 BENIGN ESSENTIAL HYPERTENSION: ICD-10-CM

## 2022-09-19 DIAGNOSIS — E03.9 HYPOTHYROIDISM, UNSPECIFIED TYPE: ICD-10-CM

## 2022-09-19 RX ORDER — AMLODIPINE BESYLATE 5 MG/1
TABLET ORAL
Qty: 90 TABLET | Refills: 0 | Status: SHIPPED | OUTPATIENT
Start: 2022-09-19

## 2022-09-19 RX ORDER — LEVOTHYROXINE SODIUM 88 UG/1
TABLET ORAL
Qty: 90 TABLET | Refills: 0 | Status: SHIPPED | OUTPATIENT
Start: 2022-09-19

## 2022-11-04 ENCOUNTER — OFFICE VISIT (OUTPATIENT)
Dept: OBGYN CLINIC | Facility: CLINIC | Age: 77
End: 2022-11-04

## 2022-11-04 VITALS
DIASTOLIC BLOOD PRESSURE: 79 MMHG | WEIGHT: 171.6 LBS | SYSTOLIC BLOOD PRESSURE: 162 MMHG | BODY MASS INDEX: 30.41 KG/M2 | HEART RATE: 62 BPM | HEIGHT: 63 IN

## 2022-11-04 DIAGNOSIS — M17.11 PRIMARY OSTEOARTHRITIS OF RIGHT KNEE: Primary | ICD-10-CM

## 2022-11-04 RX ORDER — DEXAMETHASONE SODIUM PHOSPHATE 100 MG/10ML
40 INJECTION INTRAMUSCULAR; INTRAVENOUS
Status: COMPLETED | OUTPATIENT
Start: 2022-11-04 | End: 2022-11-04

## 2022-11-04 RX ORDER — BUPIVACAINE HYDROCHLORIDE 2.5 MG/ML
4 INJECTION, SOLUTION INFILTRATION; PERINEURAL
Status: COMPLETED | OUTPATIENT
Start: 2022-11-04 | End: 2022-11-04

## 2022-11-04 RX ADMIN — DEXAMETHASONE SODIUM PHOSPHATE 40 MG: 100 INJECTION INTRAMUSCULAR; INTRAVENOUS at 09:08

## 2022-11-04 RX ADMIN — BUPIVACAINE HYDROCHLORIDE 4 ML: 2.5 INJECTION, SOLUTION INFILTRATION; PERINEURAL at 09:13

## 2022-11-04 NOTE — PROGRESS NOTES
Assessment/Plan:  1  Primary osteoarthritis of right knee  Large joint arthrocentesis: R knee     Scribe Attestation    I,:  Ector Melendez am acting as a scribe while in the presence of the attending physician :       I,:  Joseph Niel MD personally performed the services described in this documentation    as scribed in my presence :         Natacha Miller upon exam today does demonstrate signs and symptoms consistent with right knee osteoarthritis is tender of the medial joint line and has increased laxity to valgus stress consistent with medial compartment osteoarthritis  I did review x-rays from 7/21/20 bilateral knees which showed severe osteoarthritis bilaterally  Since she is had a previous steroid injection in her left knee with prolonged symptomatic relief I do believe she would benefit from a steroid injection in her right knee  She verbalized understanding was amenable to this treatment plan  She tolerated the injection well in the office today with no complications  She may follow up with me on an as-needed basis  Large joint arthrocentesis: R knee  Universal Protocol:  Consent: Verbal consent obtained  Risks and benefits: risks, benefits and alternatives were discussed  Consent given by: patient  Time out: Immediately prior to procedure a "time out" was called to verify the correct patient, procedure, equipment, support staff and site/side marked as required    Timeout called at: 11/4/2022 9:05 AM   Patient understanding: patient states understanding of the procedure being performed  Site marked: the operative site was marked  Patient identity confirmed: verbally with patient    Supporting Documentation  Indications: pain   Procedure Details  Location: knee - R knee  Preparation: Patient was prepped and draped in the usual sterile fashion  Needle size: 22 G  Ultrasound guidance: no  Approach: anterolateral  Medications administered: 4 mL bupivacaine 0 25 %; 40 mg dexamethasone 100 mg/10 mL    Patient tolerance: patient tolerated the procedure well with no immediate complications  Dressing:  Sterile dressing applied        Subjective:   Caitlyn Conteh is a 77 y o  female who presents follow-up evaluation bilateral knees  She was previously treated for left knee osteoarthritis with a steroid injection about 1 year ago  She says the steroid injection helped her and has not had to return for further treatment until today  She is having some symptoms in her left knee that are intermittent  She now complains of her right knee is also symptomatic  The right knee is now more symptomatic than the left  Her symptoms are located on the medial aspect of her knee  She reports her symptoms are present during ADLs however they are manageable  She also wears a knee sleeve when her symptoms are increased which does provide good relief  Review of Systems   Constitutional: Negative for chills, fever and unexpected weight change  HENT: Negative for nosebleeds and sore throat  Eyes: Negative for pain, redness and visual disturbance  Respiratory: Negative for cough, shortness of breath and wheezing  Cardiovascular: Negative for chest pain, palpitations and leg swelling  Gastrointestinal: Negative for nausea and vomiting  Endocrine: Negative for polydipsia and polyuria  Genitourinary: Negative for dysuria and hematuria  Musculoskeletal: Positive for arthralgias  Negative for back pain  As noted in HPI   Skin: Negative for rash and wound  Neurological: Negative for dizziness, numbness and headaches  Psychiatric/Behavioral: Negative for decreased concentration  The patient is not nervous/anxious            Past Medical History:   Diagnosis Date   • Arthropathy 10/15/2010   • Disease of thyroid gland    • Hyperlipidemia    • Hypertension    • Onychomycosis 03/04/2005       Past Surgical History:   Procedure Laterality Date   • EYE SURGERY      bilateral IOL   • MT OPEN TX RADIAL HEAD/NECK FRACTURE PROSTHETIC Right 12/5/2016    Procedure: OPEN REDUCTION W/ INTERNAL FIXATION (ORIF) ELBOW;  Surgeon: Opal Ahn MD;  Location: [address] [place] [address];  Service: Orthopedics       Family History   Problem Relation Age of Onset   • Diabetes Sister    • Breast cancer Sister 79   • No Known Problems Sister    • No Known Problems Sister    • No Known Problems Sister    • Alzheimer's disease Sister    • Cancer Mother         possibly gallbladder    • Heart disease Father    • Heart attack Father    • No Known Problems Brother    • No Known Problems Son    • No Known Problems Son    • No Known Problems Son    • No Known Problems Brother        Social History     Occupational History   • Not on file   Tobacco Use   • Smoking status: Passive Smoke Exposure - Never Smoker   • Smokeless tobacco: Never Used   Vaping Use   • Vaping Use: Never used   Substance and Sexual Activity   • Alcohol use: No   • Drug use: No   • Sexual activity: Not Currently     Partners: Male         Current Outpatient Medications:   •  amLODIPine (NORVASC) 5 mg tablet, take 1 tablet by mouth once daily, Disp: 90 tablet, Rfl: 0  •  atenolol (TENORMIN) 25 mg tablet, take 1 tablet by mouth once daily, Disp: 180 tablet, Rfl: 1  •  atorvastatin (LIPITOR) 20 mg tablet, take 1 tablet by mouth once daily, Disp: 90 tablet, Rfl: 1  •  cyclobenzaprine (FLEXERIL) 10 mg tablet, Take 1 tablet (10 mg total) by mouth daily at bedtime as needed for muscle spasms, Disp: 30 tablet, Rfl: 0  •  hydrochlorothiazide (HYDRODIURIL) 12 5 mg tablet, take 1 tablet by mouth once daily, Disp: 90 tablet, Rfl: 1  •  levothyroxine 88 mcg tablet, take 1 tablet by mouth once daily, Disp: 90 tablet, Rfl: 0  •  losartan (COZAAR) 100 MG tablet, take 1 tablet by mouth once daily, Disp: 90 tablet, Rfl: 1  •  meloxicam (MOBIC) 15 mg tablet, Take 1 tablet (15 mg total) by mouth daily as needed for mild pain or moderate pain, Disp: 90 tablet, Rfl: 0  •  oxyCODONE-acetaminophen (Percocet) 5-325 mg per tablet, Take 1 tablet by mouth every 6 (six) hours as needed for severe pain Max Daily Amount: 4 tablets (Patient not taking: Reported on 11/4/2022), Disp: 20 tablet, Rfl: 0  •  valACYclovir (VALTREX) 1,000 mg tablet, Take 1 tablet (1,000 mg total) by mouth 3 (three) times a day for 7 days, Disp: 21 tablet, Rfl: 0    Allergies   Allergen Reactions   • Amoxicillin Itching and Other (See Comments)     Other reaction(s): tingling   • Penicillins Rash       Objective:  Vitals:    11/04/22 0835   BP: 162/79   Pulse: 62       Right Knee Exam     Tenderness   The patient is experiencing tenderness in the medial joint line  Range of Motion   Extension: 0   Flexion: 120     Tests   Varus: negative Valgus: positive    Other   Pulse: present  Swelling: none      Left Knee Exam     Range of Motion   Extension: 0   Flexion: 120     Tests   Varus: negative Valgus: positive    Other   Pulse: present  Swelling: none            Physical Exam  Vitals reviewed  HENT:      Head: Normocephalic and atraumatic  Eyes:      General:         Right eye: No discharge  Left eye: No discharge  Conjunctiva/sclera: Conjunctivae normal       Pupils: Pupils are equal, round, and reactive to light  Cardiovascular:      Rate and Rhythm: Normal rate  Pulses: Normal pulses  Musculoskeletal:         General: Tenderness present  No swelling  Normal range of motion  Cervical back: Normal range of motion and neck supple  Left knee: No swelling  Normal range of motion  Tenderness present over the medial joint line  Comments: As noted in HPI   Skin:     General: Skin is warm  Neurological:      Mental Status: She is alert  I have personally reviewed pertinent films in PACS and my interpretation is as follows:  Xrays of bilateral knees from 7/21/20 demonstrates severe medial compartment osteoarthritis of both knees         This document was created using speech voice recognition software  Grammatical errors, random word insertions, pronoun errors, and incomplete sentences are an occasional consequence of this system due to software limitations, ambient noise, and hardware issues  Any formal questions or concerns about content, text, or information contained within the body of this dictation should be directly addressed to the provider for clarification

## 2022-11-29 ENCOUNTER — OFFICE VISIT (OUTPATIENT)
Dept: URGENT CARE | Facility: CLINIC | Age: 77
End: 2022-11-29

## 2022-11-29 VITALS
BODY MASS INDEX: 30.65 KG/M2 | HEIGHT: 63 IN | RESPIRATION RATE: 18 BRPM | TEMPERATURE: 98.2 F | OXYGEN SATURATION: 95 % | HEART RATE: 68 BPM | WEIGHT: 173 LBS

## 2022-11-29 DIAGNOSIS — E78.5 HYPERLIPIDEMIA LDL GOAL <130: ICD-10-CM

## 2022-11-29 DIAGNOSIS — J02.9 SORE THROAT: Primary | ICD-10-CM

## 2022-11-29 DIAGNOSIS — I10 BENIGN ESSENTIAL HYPERTENSION: ICD-10-CM

## 2022-11-29 DIAGNOSIS — H10.32 ACUTE CONJUNCTIVITIS OF LEFT EYE, UNSPECIFIED ACUTE CONJUNCTIVITIS TYPE: ICD-10-CM

## 2022-11-29 LAB — S PYO AG THROAT QL: NEGATIVE

## 2022-11-29 RX ORDER — LOSARTAN POTASSIUM 100 MG/1
100 TABLET ORAL DAILY
Qty: 90 TABLET | Refills: 1 | Status: SHIPPED | OUTPATIENT
Start: 2022-11-29

## 2022-11-29 RX ORDER — ATORVASTATIN CALCIUM 20 MG/1
20 TABLET, FILM COATED ORAL DAILY
Qty: 90 TABLET | Refills: 1 | Status: SHIPPED | OUTPATIENT
Start: 2022-11-29

## 2022-11-29 RX ORDER — OFLOXACIN 3 MG/ML
1 SOLUTION/ DROPS OPHTHALMIC 4 TIMES DAILY
Qty: 5 ML | Refills: 0 | Status: SHIPPED | OUTPATIENT
Start: 2022-11-29

## 2022-11-29 RX ORDER — HYDROCHLOROTHIAZIDE 12.5 MG/1
12.5 TABLET ORAL DAILY
Qty: 90 TABLET | Refills: 1 | Status: SHIPPED | OUTPATIENT
Start: 2022-11-29

## 2022-11-29 NOTE — PROGRESS NOTES
[facility]        NAME: Micki Lyn is a 77 y o  female  : 1945    MRN: [id number]  DATE: 2022  TIME: 9:45 AM    Assessment and Plan   Sore throat [J02 9]  1  Sore throat  POCT rapid strepA      2  Acute conjunctivitis of left eye, unspecified acute conjunctivitis type  ofloxacin (OCUFLOX) 0 3 % ophthalmic solution            Patient Instructions   Patient Instructions   - rapid strep   Drops for the left eye           Follow up with PCP in 3-5 days  Proceed to  ER if symptoms worsen  Chief Complaint     Chief Complaint   Patient presents with   • Sore Throat     Sore throat since Saturday         History of Present Illness       The patient is a 78-year-old female presenting today for sore throat  Review of Systems   Review of Systems   Constitutional: Negative for activity change, appetite change, chills, fatigue and fever  HENT: Positive for sore throat  Negative for congestion, ear pain, rhinorrhea, sinus pressure and sinus pain  Eyes: Negative for pain and visual disturbance  Respiratory: Negative for cough, chest tightness and shortness of breath  Cardiovascular: Negative for chest pain and palpitations  Gastrointestinal: Negative for abdominal pain, diarrhea, nausea and vomiting  Genitourinary: Negative for dysuria and hematuria  Musculoskeletal: Negative for arthralgias, back pain and myalgias  Skin: Negative for color change, pallor and rash  Neurological: Negative for seizures, syncope and headaches  All other systems reviewed and are negative          Current Medications       Current Outpatient Medications:   •  amLODIPine (NORVASC) 5 mg tablet, take 1 tablet by mouth once daily, Disp: 90 tablet, Rfl: 0  •  atenolol (TENORMIN) 25 mg tablet, take 1 tablet by mouth once daily, Disp: 180 tablet, Rfl: 1  •  atorvastatin (LIPITOR) 20 mg tablet, take 1 tablet by mouth once daily, Disp: 90 tablet, Rfl: 1  •  hydrochlorothiazide (HYDRODIURIL) 12 5 mg tablet, take 1 tablet by mouth once daily, Disp: 90 tablet, Rfl: 1  •  levothyroxine 88 mcg tablet, take 1 tablet by mouth once daily, Disp: 90 tablet, Rfl: 0  •  losartan (COZAAR) 100 MG tablet, take 1 tablet by mouth once daily, Disp: 90 tablet, Rfl: 1  •  ofloxacin (OCUFLOX) 0 3 % ophthalmic solution, Administer 1 drop into the left eye 4 (four) times a day, Disp: 5 mL, Rfl: 0  •  cyclobenzaprine (FLEXERIL) 10 mg tablet, Take 1 tablet (10 mg total) by mouth daily at bedtime as needed for muscle spasms (Patient not taking: Reported on 11/29/2022), Disp: 30 tablet, Rfl: 0  •  meloxicam (MOBIC) 15 mg tablet, Take 1 tablet (15 mg total) by mouth daily as needed for mild pain or moderate pain, Disp: 90 tablet, Rfl: 0  •  oxyCODONE-acetaminophen (Percocet) 5-325 mg per tablet, Take 1 tablet by mouth every 6 (six) hours as needed for severe pain Max Daily Amount: 4 tablets (Patient not taking: Reported on 11/4/2022), Disp: 20 tablet, Rfl: 0  •  valACYclovir (VALTREX) 1,000 mg tablet, Take 1 tablet (1,000 mg total) by mouth 3 (three) times a day for 7 days, Disp: 21 tablet, Rfl: 0    Current Allergies     Allergies as of 11/29/2022 - Reviewed 11/29/2022   Allergen Reaction Noted   • Amoxicillin Itching and Other (See Comments) 02/29/2016   • Penicillins Rash 06/18/2018            The following portions of the patient's history were reviewed and updated as appropriate: allergies, current medications, past family history, past medical history, past social history, past surgical history and problem list      Past Medical History:   Diagnosis Date   • Arthropathy 10/15/2010   • Disease of thyroid gland    • Hyperlipidemia    • Hypertension    • Onychomycosis 03/04/2005       Past Surgical History:   Procedure Laterality Date   • EYE SURGERY      bilateral IOL   • [place] OPEN TX RADIAL HEAD/NECK FRACTURE PROSTHETIC Right 12/5/2016    Procedure: OPEN REDUCTION W/ INTERNAL FIXATION (ORIF) ELBOW;  Surgeon: Juan Lakhani MD;  Location: [address] [place] [address];  Service: Orthopedics       Family History   Problem Relation Age of Onset   • Diabetes Sister    • Breast cancer Sister 79   • No Known Problems Sister    • No Known Problems Sister    • No Known Problems Sister    • Alzheimer's disease Sister    • Cancer Mother         possibly gallbladder    • Heart disease Father    • Heart attack Father    • No Known Problems Brother    • No Known Problems Son    • No Known Problems Son    • No Known Problems Son    • No Known Problems Brother          Medications have been verified  Objective   Pulse 68   Temp 98 2 °F (36 8 °C)   Resp 18   Ht 5' 3" (1 6 m)   Wt 78 5 kg (173 lb)   SpO2 95%   BMI 30 65 kg/m²        Physical Exam     Physical Exam  Vitals and nursing note reviewed  Constitutional:       General: She is not in acute distress  Appearance: Normal appearance  She is well-developed and normal weight  She is not ill-appearing, toxic-appearing or diaphoretic  HENT:      Head: Normocephalic and atraumatic  Comments: Erythema of left eye with tearing      Right Ear: Tympanic membrane and ear canal normal  No drainage, swelling or tenderness  No middle ear effusion  Tympanic membrane is not erythematous  Left Ear: Tympanic membrane and ear canal normal  No drainage, swelling or tenderness  No middle ear effusion  Tympanic membrane is not erythematous  Nose: No congestion or rhinorrhea  Mouth/Throat:      Mouth: Mucous membranes are moist  No oral lesions  Pharynx: Oropharynx is clear  Uvula midline  Posterior oropharyngeal erythema present  No pharyngeal swelling, oropharyngeal exudate or uvula swelling  Tonsils: No tonsillar exudate or tonsillar abscesses  0 on the right  0 on the left  Eyes:      Extraocular Movements:      Right eye: Normal extraocular motion  Left eye: Normal extraocular motion        Conjunctiva/sclera: Conjunctivae normal       Pupils: Pupils are equal, round, and reactive to light  Cardiovascular:      Rate and Rhythm: Normal rate and regular rhythm  Heart sounds: Normal heart sounds  No murmur heard  No friction rub  No gallop  Pulmonary:      Effort: Pulmonary effort is normal  No respiratory distress  Breath sounds: Normal breath sounds  No stridor  No wheezing, rhonchi or rales  Chest:      Chest wall: No tenderness  Skin:     General: Skin is warm and dry  Capillary Refill: Capillary refill takes less than 2 seconds  Neurological:      Mental Status: She is alert

## 2022-12-03 ENCOUNTER — OFFICE VISIT (OUTPATIENT)
Dept: URGENT CARE | Facility: CLINIC | Age: 77
End: 2022-12-03

## 2022-12-03 VITALS
RESPIRATION RATE: 20 BRPM | OXYGEN SATURATION: 96 % | SYSTOLIC BLOOD PRESSURE: 128 MMHG | BODY MASS INDEX: 31.65 KG/M2 | HEIGHT: 62 IN | DIASTOLIC BLOOD PRESSURE: 70 MMHG | WEIGHT: 172 LBS | HEART RATE: 90 BPM | TEMPERATURE: 98.7 F

## 2022-12-03 DIAGNOSIS — J06.9 UPPER RESPIRATORY TRACT INFECTION, UNSPECIFIED TYPE: Primary | ICD-10-CM

## 2022-12-03 NOTE — PROGRESS NOTES
[facility]        NAME: Stephany Hathaway is a 77 y o  female  : 1945    MRN: [id number]  DATE: December 3, 2022  TIME: 10:35 AM    Assessment and Plan   No primary diagnosis found  No diagnosis found  Patient Instructions       Follow up with PCP in 3-5 days  Proceed to  ER if symptoms worsen  Chief Complaint     Chief Complaint   Patient presents with   • Cold Like Symptoms     Pt here ill x 8 days pt states she was seen  no meds given,  pt states sore throat, runny nose,  no fever  Pt used Nyquil  Pt is vaxed  x5  and flu current  History of Present Illness       Head cold symptoms x1 week  Continue a runny nose and cough  Review of Systems   Review of Systems   HENT: Positive for congestion and postnasal drip  Respiratory: Positive for cough  Negative for shortness of breath            Current Medications       Current Outpatient Medications:   •  amLODIPine (NORVASC) 5 mg tablet, take 1 tablet by mouth once daily, Disp: 90 tablet, Rfl: 0  •  atenolol (TENORMIN) 25 mg tablet, take 1 tablet by mouth once daily, Disp: 180 tablet, Rfl: 1  •  atorvastatin (LIPITOR) 20 mg tablet, Take 1 tablet (20 mg total) by mouth daily, Disp: 90 tablet, Rfl: 1  •  cyclobenzaprine (FLEXERIL) 10 mg tablet, Take 1 tablet (10 mg total) by mouth daily at bedtime as needed for muscle spasms, Disp: 30 tablet, Rfl: 0  •  hydrochlorothiazide (HYDRODIURIL) 12 5 mg tablet, Take 1 tablet (12 5 mg total) by mouth daily, Disp: 90 tablet, Rfl: 1  •  levothyroxine 88 mcg tablet, take 1 tablet by mouth once daily, Disp: 90 tablet, Rfl: 0  •  losartan (COZAAR) 100 MG tablet, Take 1 tablet (100 mg total) by mouth daily, Disp: 90 tablet, Rfl: 1  •  ofloxacin (OCUFLOX) 0 3 % ophthalmic solution, Administer 1 drop into the left eye 4 (four) times a day, Disp: 5 mL, Rfl: 0    Current Allergies     Allergies as of 2022 - Reviewed 2022   Allergen Reaction Noted   • Amoxicillin Itching and Other (See Comments) 02/29/2016   • Penicillins Rash 06/18/2018            The following portions of the patient's history were reviewed and updated as appropriate: allergies, current medications, past family history, past medical history, past social history, past surgical history and problem list      Past Medical History:   Diagnosis Date   • Arthropathy 10/15/2010   • Disease of thyroid gland    • Hyperlipidemia    • Hypertension    • Onychomycosis 03/04/2005       Past Surgical History:   Procedure Laterality Date   • EYE SURGERY      bilateral IOL   • [place] OPEN TX RADIAL HEAD/NECK FRACTURE PROSTHETIC Right 12/5/2016    Procedure: OPEN REDUCTION W/ INTERNAL FIXATION (ORIF) ELBOW;  Surgeon: Shauna Allen MD;  Location: [address] [place] [address];  Service: Orthopedics       Family History   Problem Relation Age of Onset   • Diabetes Sister    • Breast cancer Sister 79   • No Known Problems Sister    • No Known Problems Sister    • No Known Problems Sister    • Alzheimer's disease Sister    • Cancer Mother         possibly gallbladder    • Heart disease Father    • Heart attack Father    • No Known Problems Brother    • No Known Problems Son    • No Known Problems Son    • No Known Problems Son    • No Known Problems Brother          Medications have been verified  Objective   /70   Pulse 90   Temp 98 7 °F (37 1 °C)   Resp 20   Ht 5' 2" (1 575 m)   Wt 78 kg (172 lb)   SpO2 96%   BMI 31 46 kg/m²   No LMP recorded  Patient is postmenopausal        Physical Exam     Physical Exam  Pulmonary:      Breath sounds: Normal breath sounds  No wheezing, rhonchi or rales

## 2022-12-05 LAB
FLUAV RNA RESP QL NAA+PROBE: NEGATIVE
FLUBV RNA RESP QL NAA+PROBE: NEGATIVE
SARS-COV-2 RNA RESP QL NAA+PROBE: NEGATIVE

## 2022-12-12 LAB
ALBUMIN SERPL-MCNC: 4.5 G/DL (ref 3.7–4.7)
ALBUMIN/GLOB SERPL: 1.6 {RATIO} (ref 1.2–2.2)
ALP SERPL-CCNC: 80 IU/L (ref 44–121)
ALT SERPL-CCNC: 29 IU/L (ref 0–32)
AST SERPL-CCNC: 29 IU/L (ref 0–40)
BILIRUB SERPL-MCNC: 0.4 MG/DL (ref 0–1.2)
BUN SERPL-MCNC: 11 MG/DL (ref 8–27)
BUN/CREAT SERPL: 14 (ref 12–28)
CALCIUM SERPL-MCNC: 9.5 MG/DL (ref 8.7–10.3)
CHLORIDE SERPL-SCNC: 98 MMOL/L (ref 96–106)
CO2 SERPL-SCNC: 22 MMOL/L (ref 20–29)
CREAT SERPL-MCNC: 0.81 MG/DL (ref 0.57–1)
EGFR: 76 ML/MIN/1.73
GLOBULIN SER-MCNC: 2.8 G/DL (ref 1.5–4.5)
GLUCOSE SERPL-MCNC: 115 MG/DL (ref 70–99)
HBA1C MFR BLD: 5.7 % (ref 4.8–5.6)
POTASSIUM SERPL-SCNC: 4.3 MMOL/L (ref 3.5–5.2)
PROT SERPL-MCNC: 7.3 G/DL (ref 6–8.5)
SODIUM SERPL-SCNC: 136 MMOL/L (ref 134–144)
TSH SERPL DL<=0.005 MIU/L-ACNC: 5.37 UIU/ML (ref 0.45–4.5)

## 2022-12-15 DIAGNOSIS — I10 BENIGN ESSENTIAL HYPERTENSION: ICD-10-CM

## 2022-12-15 DIAGNOSIS — E03.9 HYPOTHYROIDISM, UNSPECIFIED TYPE: ICD-10-CM

## 2022-12-15 RX ORDER — AMLODIPINE BESYLATE 5 MG/1
5 TABLET ORAL DAILY
Qty: 90 TABLET | Refills: 0 | Status: SHIPPED | OUTPATIENT
Start: 2022-12-15

## 2022-12-15 RX ORDER — LEVOTHYROXINE SODIUM 88 UG/1
88 TABLET ORAL DAILY
Qty: 90 TABLET | Refills: 0 | Status: SHIPPED | OUTPATIENT
Start: 2022-12-15

## 2022-12-28 ENCOUNTER — OFFICE VISIT (OUTPATIENT)
Dept: URGENT CARE | Facility: CLINIC | Age: 77
End: 2022-12-28

## 2022-12-28 VITALS
HEART RATE: 78 BPM | OXYGEN SATURATION: 94 % | SYSTOLIC BLOOD PRESSURE: 158 MMHG | TEMPERATURE: 99.8 F | DIASTOLIC BLOOD PRESSURE: 86 MMHG

## 2022-12-28 DIAGNOSIS — J20.8 ACUTE BACTERIAL BRONCHITIS: Primary | ICD-10-CM

## 2022-12-28 DIAGNOSIS — B96.89 ACUTE BACTERIAL BRONCHITIS: Primary | ICD-10-CM

## 2022-12-28 RX ORDER — BENZONATATE 200 MG/1
200 CAPSULE ORAL 3 TIMES DAILY PRN
Qty: 20 CAPSULE | Refills: 0 | Status: SHIPPED | OUTPATIENT
Start: 2022-12-28

## 2022-12-28 RX ORDER — AZITHROMYCIN 250 MG/1
TABLET, FILM COATED ORAL
Qty: 6 TABLET | Refills: 0 | Status: SHIPPED | OUTPATIENT
Start: 2022-12-28 | End: 2023-01-01

## 2022-12-28 NOTE — PROGRESS NOTES
[facility]        NAME: Tony Colon is a 69 y o  female  : 1945    MRN: [id number]  DATE: 2022  TIME: 11:08 AM    Assessment and Plan   Acute bacterial bronchitis [J20 8, B96 89]  1  Acute bacterial bronchitis  azithromycin (ZITHROMAX) 250 mg tablet    benzonatate (TESSALON) 200 MG capsule            Patient Instructions     Continue to monitor symptoms  If new or worsening symptoms develop, go immediately to Er  Drink plenty of fluids  Follow up with Family Doctor this week  Chief Complaint     Chief Complaint   Patient presents with   • Cough     X 2 weeks         History of Present Illness       Cough  This is a new problem  Episode onset: Over 2 weeks ago  Was improving but worsened over the past few days  Productive cough with near emesis  The problem has been gradually worsening  The problem occurs every few minutes  The cough is productive of brown sputum  Associated symptoms include nasal congestion, postnasal drip and a sore throat  Pertinent negatives include no chest pain, chills, ear pain, fever, headaches, myalgias, rash, rhinorrhea, shortness of breath or wheezing  Nothing aggravates the symptoms  Risk factors:  smokes  Treatments tried: Mucinex  The treatment provided no relief  There is no history of asthma   currently admitted for pneumonia    Review of Systems   Review of Systems   Constitutional: Negative for chills, diaphoresis, fatigue and fever  HENT: Positive for postnasal drip, sinus pressure, sinus pain, sneezing and sore throat  Negative for congestion, ear pain, rhinorrhea and voice change  Eyes: Negative  Respiratory: Positive for cough  Negative for chest tightness, shortness of breath and wheezing  Cardiovascular: Negative for chest pain and palpitations  Gastrointestinal: Negative for abdominal pain, constipation, diarrhea, nausea and vomiting  Endocrine: Negative  Genitourinary: Negative for dysuria  Musculoskeletal: Negative for back pain, myalgias and neck pain  Skin: Negative for pallor and rash  Allergic/Immunologic: Negative  Neurological: Negative for dizziness, syncope and headaches  Hematological: Negative  Psychiatric/Behavioral: Negative            Current Medications       Current Outpatient Medications:   •  azithromycin (ZITHROMAX) 250 mg tablet, Take 2 tablets today then 1 tablet daily x 4 days, Disp: 6 tablet, Rfl: 0  •  benzonatate (TESSALON) 200 MG capsule, Take 1 capsule (200 mg total) by mouth 3 (three) times a day as needed for cough, Disp: 20 capsule, Rfl: 0  •  amLODIPine (NORVASC) 5 mg tablet, Take 1 tablet (5 mg total) by mouth daily, Disp: 90 tablet, Rfl: 0  •  atenolol (TENORMIN) 25 mg tablet, take 1 tablet by mouth once daily, Disp: 180 tablet, Rfl: 1  •  atorvastatin (LIPITOR) 20 mg tablet, Take 1 tablet (20 mg total) by mouth daily, Disp: 90 tablet, Rfl: 1  •  cyclobenzaprine (FLEXERIL) 10 mg tablet, Take 1 tablet (10 mg total) by mouth daily at bedtime as needed for muscle spasms, Disp: 30 tablet, Rfl: 0  •  hydrochlorothiazide (HYDRODIURIL) 12 5 mg tablet, Take 1 tablet (12 5 mg total) by mouth daily, Disp: 90 tablet, Rfl: 1  •  levothyroxine 88 mcg tablet, Take 1 tablet (88 mcg total) by mouth daily, Disp: 90 tablet, Rfl: 0  •  losartan (COZAAR) 100 MG tablet, Take 1 tablet (100 mg total) by mouth daily, Disp: 90 tablet, Rfl: 1  •  ofloxacin (OCUFLOX) 0 3 % ophthalmic solution, Administer 1 drop into the left eye 4 (four) times a day, Disp: 5 mL, Rfl: 0    Current Allergies     Allergies as of 12/28/2022 - Reviewed 12/28/2022   Allergen Reaction Noted   • Amoxicillin Itching and Other (See Comments) 02/29/2016   • Penicillins Rash 06/18/2018            The following portions of the patient's history were reviewed and updated as appropriate: allergies, current medications, past family history, past medical history, past social history, past surgical history and problem list      Past Medical History:   Diagnosis Date   • Arthropathy 10/15/2010   • Disease of thyroid gland    • Hyperlipidemia    • Hypertension    • Onychomycosis 03/04/2005       Past Surgical History:   Procedure Laterality Date   • EYE SURGERY      bilateral IOL   • [place] OPEN TX RADIAL HEAD/NECK FRACTURE PROSTHETIC Right 12/5/2016    Procedure: OPEN REDUCTION W/ INTERNAL FIXATION (ORIF) ELBOW;  Surgeon: Lilly Weiner MD;  Location: [address] [place] [address];  Service: Orthopedics       Family History   Problem Relation Age of Onset   • Diabetes Sister    • Breast cancer Sister 79   • No Known Problems Sister    • No Known Problems Sister    • No Known Problems Sister    • Alzheimer's disease Sister    • Cancer Mother         possibly gallbladder    • Heart disease Father    • Heart attack Father    • No Known Problems Brother    • No Known Problems Son    • No Known Problems Son    • No Known Problems Son    • No Known Problems Brother          Medications have been verified  Objective   /86   Pulse 78   Temp 99 8 °F (37 7 °C)   SpO2 94%        Physical Exam     Physical Exam  Vitals and nursing note reviewed  Constitutional:       General: She is not in acute distress  Appearance: Normal appearance  She is well-developed  She is not ill-appearing or diaphoretic  HENT:      Head: Normocephalic and atraumatic  Right Ear: Hearing, ear canal and external ear normal  A middle ear effusion is present  Tympanic membrane is erythematous and bulging  Left Ear: Hearing, ear canal and external ear normal  A middle ear effusion is present  Tympanic membrane is bulging  Tympanic membrane is not erythematous  Nose: Congestion present  No rhinorrhea  Mouth/Throat:      Pharynx: Posterior oropharyngeal erythema present  No oropharyngeal exudate  Eyes:      General:         Right eye: No discharge  Left eye: No discharge        Conjunctiva/sclera: Conjunctivae normal    Cardiovascular: Rate and Rhythm: Normal rate and regular rhythm  Heart sounds: Normal heart sounds  Pulmonary:      Effort: Pulmonary effort is normal  No respiratory distress  Breath sounds: Wheezing (mild apical b/l) present  No rhonchi or rales  Musculoskeletal:      Cervical back: Normal range of motion and neck supple  Lymphadenopathy:      Cervical: No cervical adenopathy  Skin:     General: Skin is warm  Findings: No rash  Neurological:      Mental Status: She is alert

## 2022-12-28 NOTE — PATIENT INSTRUCTIONS
Continue to monitor symptoms  If new or worsening symptoms develop, go immediately to Er  Drink plenty of fluids  Follow up with Family Doctor this week  Acute Bronchitis   WHAT YOU NEED TO KNOW:   Acute bronchitis is swelling and irritation in your lungs  It is usually caused by a virus and most often happens in the winter  Bronchitis may also be caused by bacteria or by a chemical irritant, such as smoke  DISCHARGE INSTRUCTIONS:   Return to the emergency department if:   You cough up blood  Your lips or fingernails turn blue  You feel like you are not getting enough air when you breathe  Call your doctor if:   Your symptoms do not go away or get worse, even after treatment  Your cough does not get better within 4 weeks  You have questions or concerns about your condition or care  Medicines: You may  need any of the following:  Cough suppressants  decrease your urge to cough  Decongestants  help loosen mucus in your lungs and make it easier to cough up  This can help you breathe easier  Inhalers  may be given  Your healthcare provider may give you one or more inhalers to help you breathe easier and cough less  An inhaler gives your medicine to open your airways  Ask your healthcare provider to show you how to use your inhaler correctly  Antibiotics  may be given for up to 5 days if your bronchitis is caused by bacteria  Acetaminophen  decreases pain and fever  It is available without a doctor's order  Ask how much to take and how often to take it  Follow directions  Read the labels of all other medicines you are using to see if they also contain acetaminophen, or ask your doctor or pharmacist  Acetaminophen can cause liver damage if not taken correctly  Do not use more than 4 grams (4,000 milligrams) total of acetaminophen in one day  NSAIDs  help decrease swelling and pain or fever  This medicine is available with or without a doctor's order   NSAIDs can cause stomach bleeding or kidney problems in certain people  If you take blood thinner medicine, always ask your healthcare provider if NSAIDs are safe for you  Always read the medicine label and follow directions  Take your medicine as directed  Contact your healthcare provider if you think your medicine is not helping or if you have side effects  Tell him of her if you are allergic to any medicine  Keep a list of the medicines, vitamins, and herbs you take  Include the amounts, and when and why you take them  Bring the list or the pill bottles to follow-up visits  Carry your medicine list with you in case of an emergency  Self-care:   Drink liquids as directed  You may need to drink more liquids than usual to stay hydrated  Ask how much liquid to drink each day and which liquids are best for you  Use a cool mist humidifier  to increase air moisture in your home  This may make it easier for you to breathe and help decrease your cough  Get more rest   Rest helps your body to heal  Slowly start to do more each day  Rest when you feel it is needed  Avoid irritants in the air  Avoid chemicals, fumes, and dust  Wear a face mask if you must work around dust or fumes  Stay inside on days when air pollution levels are high  If you have allergies, stay inside when pollen counts are high  Do not use aerosol products, such as spray-on deodorant, bug spray, and hair spray  Do not smoke or be around others who are smoking  Nicotine and other chemicals in cigarettes and cigars can cause lung damage  Ask your healthcare provider for information if you currently smoke and need help to quit  E-cigarettes or smokeless tobacco still contain nicotine  Talk to your healthcare provider before you use these products  Prevent acute bronchitis:       Ask about vaccines you may need  Get a flu vaccine each year as soon as recommended, usually in September or October   Ask your healthcare provider if you should also get a pneumonia or COVID-19 vaccine  Your healthcare provider can tell you if you should also get other vaccines, and when to get them  Prevent the spread of germs  You can decrease your risk for acute bronchitis and other illnesses by doing the following:     Wash your hands often with soap and water  Carry germ-killing hand lotion or gel with you  You can use the lotion or gel to clean your hands when soap and water are not available  Do not touch your eyes, nose, or mouth unless you have washed your hands first     Always cover your mouth when you cough to prevent the spread of germs  It is best to cough into a tissue or your shirt sleeve instead of into your hand  Ask those around you to cover their mouths when they cough  Try to avoid people who have a cold or the flu  If you are sick, stay away from others as much as possible  Follow up with your doctor as directed:  Write down questions you have so you will remember to ask them during your follow-up visits  © Copyright [employer] 2022 Information is for End User's use only and may not be sold, redistributed or otherwise used for commercial purposes  All illustrations and images included in CareNotes® are the copyrighted property of A [employer]  or [employer]   The above information is an  only  It is not intended as medical advice for individual conditions or treatments  Talk to your doctor, nurse or pharmacist before following any medical regimen to see if it is safe and effective for you

## 2022-12-30 ENCOUNTER — OFFICE VISIT (OUTPATIENT)
Dept: FAMILY MEDICINE CLINIC | Facility: CLINIC | Age: 77
End: 2022-12-30

## 2022-12-30 VITALS
DIASTOLIC BLOOD PRESSURE: 90 MMHG | SYSTOLIC BLOOD PRESSURE: 172 MMHG | WEIGHT: 168 LBS | BODY MASS INDEX: 30.73 KG/M2 | TEMPERATURE: 100 F | RESPIRATION RATE: 18 BRPM | OXYGEN SATURATION: 96 % | HEART RATE: 68 BPM

## 2022-12-30 DIAGNOSIS — R73.01 IFG (IMPAIRED FASTING GLUCOSE): ICD-10-CM

## 2022-12-30 DIAGNOSIS — E78.5 HYPERLIPIDEMIA LDL GOAL <130: ICD-10-CM

## 2022-12-30 DIAGNOSIS — I10 BENIGN ESSENTIAL HYPERTENSION: Primary | ICD-10-CM

## 2022-12-30 DIAGNOSIS — Z12.31 BREAST CANCER SCREENING BY MAMMOGRAM: ICD-10-CM

## 2022-12-30 DIAGNOSIS — Z12.11 COLON CANCER SCREENING: ICD-10-CM

## 2022-12-30 DIAGNOSIS — E03.8 OTHER SPECIFIED HYPOTHYROIDISM: ICD-10-CM

## 2022-12-30 NOTE — PATIENT INSTRUCTIONS
Jeanne Bettencourt is a good, though not equivalent, alternative to a colonoscopy for low risk individuals  You can contact our office if you would like this ordered for you, after you find out if your insurance company will cover it  A normal result may be valid for 3 years but then the test would be repeated every 3 years

## 2023-01-03 ENCOUNTER — TELEPHONE (OUTPATIENT)
Dept: FAMILY MEDICINE CLINIC | Facility: CLINIC | Age: 78
End: 2023-01-03

## 2023-01-03 DIAGNOSIS — J01.00 ACUTE NON-RECURRENT MAXILLARY SINUSITIS: ICD-10-CM

## 2023-01-03 RX ORDER — DOXYCYCLINE 100 MG/1
100 CAPSULE ORAL 2 TIMES DAILY
Qty: 20 CAPSULE | Refills: 0 | Status: SHIPPED | OUTPATIENT
Start: 2023-01-03 | End: 2023-01-13

## 2023-01-03 NOTE — TELEPHONE ENCOUNTER
DR Vicente Cruz    Patient is calling stating she is still having yellow mucous and was told to call Dr if it continued  She also stated her  Bayron Marquez passed in the hospital last night

## 2023-01-03 NOTE — TELEPHONE ENCOUNTER
Please let her know I sent a 10 day antibiotic to her pharmacy and I knew about Christiano last night

## 2023-03-10 ENCOUNTER — HOSPITAL ENCOUNTER (OUTPATIENT)
Dept: RADIOLOGY | Facility: HOSPITAL | Age: 78
Discharge: HOME | End: 2023-03-10
Attending: FAMILY MEDICINE

## 2023-03-10 VITALS — HEIGHT: 63 IN | BODY MASS INDEX: 29.41 KG/M2 | WEIGHT: 166 LBS

## 2023-03-10 DIAGNOSIS — Z12.31 BREAST CANCER SCREENING BY MAMMOGRAM: ICD-10-CM

## 2023-03-14 DIAGNOSIS — E03.9 HYPOTHYROIDISM, UNSPECIFIED TYPE: ICD-10-CM

## 2023-03-14 DIAGNOSIS — I10 BENIGN ESSENTIAL HYPERTENSION: ICD-10-CM

## 2023-03-15 RX ORDER — AMLODIPINE BESYLATE 5 MG/1
5 TABLET ORAL DAILY
Qty: 90 TABLET | Refills: 1 | Status: SHIPPED | OUTPATIENT
Start: 2023-03-15

## 2023-03-15 RX ORDER — LEVOTHYROXINE SODIUM 88 UG/1
88 TABLET ORAL DAILY
Qty: 90 TABLET | Refills: 1 | Status: SHIPPED | OUTPATIENT
Start: 2023-03-15

## 2023-03-24 ENCOUNTER — OFFICE VISIT (OUTPATIENT)
Dept: FAMILY MEDICINE CLINIC | Facility: CLINIC | Age: 78
End: 2023-03-24

## 2023-03-24 VITALS
DIASTOLIC BLOOD PRESSURE: 84 MMHG | WEIGHT: 167 LBS | SYSTOLIC BLOOD PRESSURE: 156 MMHG | BODY MASS INDEX: 29.58 KG/M2 | TEMPERATURE: 98.5 F | HEART RATE: 62 BPM | RESPIRATION RATE: 18 BRPM

## 2023-03-24 DIAGNOSIS — E03.8 OTHER SPECIFIED HYPOTHYROIDISM: ICD-10-CM

## 2023-03-24 DIAGNOSIS — R05.2 SUBACUTE COUGH: Primary | ICD-10-CM

## 2023-03-24 DIAGNOSIS — E78.5 HYPERLIPIDEMIA LDL GOAL <130: ICD-10-CM

## 2023-03-24 DIAGNOSIS — I10 BENIGN ESSENTIAL HYPERTENSION: ICD-10-CM

## 2023-03-24 PROBLEM — R73.01 IFG (IMPAIRED FASTING GLUCOSE): Status: RESOLVED | Noted: 2022-06-30 | Resolved: 2023-03-24

## 2023-03-24 RX ORDER — METHYLPREDNISOLONE 4 MG/1
TABLET ORAL
Qty: 21 TABLET | Refills: 0 | Status: SHIPPED | OUTPATIENT
Start: 2023-03-24 | End: 2023-03-30

## 2023-03-24 NOTE — PROGRESS NOTES
Assessment/Plan:    No problem-specific Assessment & Plan notes found for this encounter  Cough  Inflammatory vs allergy vs LPR vs ARB  Steroid risks aware  cxr if no better  Significant 2nd hand smoke exposure  CT if no better    Htn/hypoTSH stable    Coping ok with loss of  Sujata File who verbalized at the end that he did not want to be re-intubated, etc      Diagnoses and all orders for this visit:    Subacute cough  -     methylPREDNISolone 4 MG tablet therapy pack; Use as directed on package  -     XR chest pa & lateral; Future    Benign essential hypertension    Other specified hypothyroidism    Hyperlipidemia LDL goal <130      Return if symptoms worsen or fail to improve  Subjective:      Patient ID: Ana Cristina Norris is a 69 y o  female  Chief Complaint   Patient presents with   • Cough     SX since February         sas/cma       HPI  Coughing  On/off few months  No mucus  Dry  No fever  Worse with deep breath  No wt loss  No hemoptysis    Home bp 114/71    The following portions of the patient's history were reviewed and updated as appropriate: allergies, current medications, past family history, past medical history, past social history, past surgical history and problem list     Review of Systems   Constitutional: Negative for chills and fever  Respiratory: Positive for cough  Negative for wheezing            Current Outpatient Medications   Medication Sig Dispense Refill   • amLODIPine (NORVASC) 5 mg tablet Take 1 tablet (5 mg total) by mouth daily 90 tablet 1   • atenolol (TENORMIN) 25 mg tablet take 1 tablet by mouth once daily 180 tablet 1   • atorvastatin (LIPITOR) 20 mg tablet Take 1 tablet (20 mg total) by mouth daily 90 tablet 1   • hydrochlorothiazide (HYDRODIURIL) 12 5 mg tablet Take 1 tablet (12 5 mg total) by mouth daily 90 tablet 1   • levothyroxine 88 mcg tablet Take 1 tablet (88 mcg total) by mouth daily 90 tablet 1   • losartan (COZAAR) 100 MG tablet Take 1 tablet (100 mg total) by mouth daily 90 tablet 1   • methylPREDNISolone 4 MG tablet therapy pack Use as directed on package 21 tablet 0   • cyclobenzaprine (FLEXERIL) 10 mg tablet Take 1 tablet (10 mg total) by mouth daily at bedtime as needed for muscle spasms (Patient not taking: Reported on 3/24/2023) 30 tablet 0     No current facility-administered medications for this visit  Objective:    /84   Pulse 62   Temp 98 5 °F (36 9 °C)   Resp 18   Wt 75 8 kg (167 lb)   BMI 29 58 kg/m²        Physical Exam  Vitals and nursing note reviewed  Constitutional:       General: She is not in acute distress  Appearance: She is well-developed  HENT:      Head: Normocephalic  Right Ear: Tympanic membrane normal       Left Ear: Tympanic membrane normal       Nose: No congestion  Eyes:      General: No scleral icterus  Conjunctiva/sclera: Conjunctivae normal    Cardiovascular:      Rate and Rhythm: Normal rate and regular rhythm  Pulmonary:      Effort: Pulmonary effort is normal  No respiratory distress  Breath sounds: No wheezing, rhonchi or rales  Abdominal:      Palpations: Abdomen is soft  Musculoskeletal:         General: No deformity  Cervical back: Neck supple  Skin:     General: Skin is warm and dry  Coloration: Skin is not pale  Neurological:      Mental Status: She is alert  Motor: No weakness  Gait: Gait normal    Psychiatric:         Mood and Affect: Mood normal          Behavior: Behavior normal          Thought Content:  Thought content normal                 Denisse Copeland DO

## 2023-03-31 ENCOUNTER — HOSPITAL ENCOUNTER (OUTPATIENT)
Dept: RADIOLOGY | Facility: HOSPITAL | Age: 78
Discharge: HOME | End: 2023-03-31

## 2023-03-31 DIAGNOSIS — R05.2 SUBACUTE COUGH: ICD-10-CM

## 2023-05-23 DIAGNOSIS — E78.5 HYPERLIPIDEMIA LDL GOAL <130: ICD-10-CM

## 2023-05-23 DIAGNOSIS — I10 BENIGN ESSENTIAL HYPERTENSION: ICD-10-CM

## 2023-05-23 PROBLEM — R05.2 SUBACUTE COUGH: Status: RESOLVED | Noted: 2023-03-24 | Resolved: 2023-05-23

## 2023-05-23 RX ORDER — ATORVASTATIN CALCIUM 20 MG/1
20 TABLET, FILM COATED ORAL DAILY
Qty: 90 TABLET | Refills: 0 | Status: SHIPPED | OUTPATIENT
Start: 2023-05-23

## 2023-05-23 RX ORDER — HYDROCHLOROTHIAZIDE 12.5 MG/1
12.5 TABLET ORAL DAILY
Qty: 90 TABLET | Refills: 0 | Status: SHIPPED | OUTPATIENT
Start: 2023-05-23

## 2023-05-23 RX ORDER — LOSARTAN POTASSIUM 100 MG/1
100 TABLET ORAL DAILY
Qty: 90 TABLET | Refills: 0 | Status: SHIPPED | OUTPATIENT
Start: 2023-05-23

## 2023-06-15 LAB
ALBUMIN SERPL-MCNC: 4.2 G/DL (ref 3.7–4.7)
ALBUMIN/GLOB SERPL: 1.4 {RATIO} (ref 1.2–2.2)
ALP SERPL-CCNC: 74 IU/L (ref 44–121)
ALT SERPL-CCNC: 13 IU/L (ref 0–32)
AST SERPL-CCNC: 19 IU/L (ref 0–40)
BILIRUB SERPL-MCNC: 0.6 MG/DL (ref 0–1.2)
BUN SERPL-MCNC: 14 MG/DL (ref 8–27)
BUN/CREAT SERPL: 16 (ref 12–28)
CALCIUM SERPL-MCNC: 9.6 MG/DL (ref 8.7–10.3)
CHLORIDE SERPL-SCNC: 99 MMOL/L (ref 96–106)
CHOLEST SERPL-MCNC: 149 MG/DL (ref 100–199)
CO2 SERPL-SCNC: 25 MMOL/L (ref 20–29)
CREAT SERPL-MCNC: 0.85 MG/DL (ref 0.57–1)
EGFR: 71 ML/MIN/1.73
GLOBULIN SER-MCNC: 3.1 G/DL (ref 1.5–4.5)
GLUCOSE SERPL-MCNC: 104 MG/DL (ref 70–99)
HBA1C MFR BLD: 5.8 % (ref 4.8–5.6)
HDLC SERPL-MCNC: 47 MG/DL
LDLC SERPL CALC-MCNC: 84 MG/DL (ref 0–99)
MICRODELETION SYND BLD/T FISH: NORMAL
POTASSIUM SERPL-SCNC: 4.6 MMOL/L (ref 3.5–5.2)
PROT SERPL-MCNC: 7.3 G/DL (ref 6–8.5)
SODIUM SERPL-SCNC: 137 MMOL/L (ref 134–144)
TRIGL SERPL-MCNC: 96 MG/DL (ref 0–149)
TSH SERPL DL<=0.005 MIU/L-ACNC: 2.6 UIU/ML (ref 0.45–4.5)

## 2023-06-28 ENCOUNTER — OFFICE VISIT (OUTPATIENT)
Dept: FAMILY MEDICINE CLINIC | Facility: CLINIC | Age: 78
End: 2023-06-28
Payer: COMMERCIAL

## 2023-06-28 VITALS
OXYGEN SATURATION: 95 % | RESPIRATION RATE: 16 BRPM | WEIGHT: 165 LBS | BODY MASS INDEX: 29.23 KG/M2 | DIASTOLIC BLOOD PRESSURE: 80 MMHG | TEMPERATURE: 97.9 F | HEART RATE: 84 BPM | HEIGHT: 63 IN | SYSTOLIC BLOOD PRESSURE: 130 MMHG

## 2023-06-28 DIAGNOSIS — E78.5 HYPERLIPIDEMIA LDL GOAL <130: ICD-10-CM

## 2023-06-28 DIAGNOSIS — R39.9 UTI SYMPTOMS: ICD-10-CM

## 2023-06-28 DIAGNOSIS — R73.03 PREDIABETES: ICD-10-CM

## 2023-06-28 DIAGNOSIS — R31.9 HEMATURIA, UNSPECIFIED TYPE: ICD-10-CM

## 2023-06-28 DIAGNOSIS — I10 BENIGN ESSENTIAL HYPERTENSION: ICD-10-CM

## 2023-06-28 DIAGNOSIS — Z00.00 MEDICARE ANNUAL WELLNESS VISIT, SUBSEQUENT: Primary | ICD-10-CM

## 2023-06-28 DIAGNOSIS — Z78.0 POSTMENOPAUSAL STATE: ICD-10-CM

## 2023-06-28 DIAGNOSIS — E03.8 OTHER SPECIFIED HYPOTHYROIDISM: ICD-10-CM

## 2023-06-28 LAB
SL AMB  POCT GLUCOSE, UA: NORMAL
SL AMB LEUKOCYTE ESTERASE,UA: NORMAL
SL AMB POCT BILIRUBIN,UA: NORMAL
SL AMB POCT BLOOD,UA: NORMAL
SL AMB POCT CLARITY,UA: CLEAR
SL AMB POCT COLOR,UA: YELLOW
SL AMB POCT KETONES,UA: NORMAL
SL AMB POCT NITRITE,UA: NORMAL
SL AMB POCT PH,UA: 7
SL AMB POCT SPECIFIC GRAVITY,UA: 1.01
SL AMB POCT URINE PROTEIN: NORMAL
SL AMB POCT UROBILINOGEN: 0.2

## 2023-06-28 PROCEDURE — 81003 URINALYSIS AUTO W/O SCOPE: CPT | Performed by: FAMILY MEDICINE

## 2023-06-28 PROCEDURE — 99214 OFFICE O/P EST MOD 30 MIN: CPT | Performed by: FAMILY MEDICINE

## 2023-06-28 PROCEDURE — G0439 PPPS, SUBSEQ VISIT: HCPCS | Performed by: FAMILY MEDICINE

## 2023-06-28 NOTE — PROGRESS NOTES
Assessment/Plan:    No problem-specific Assessment & Plan notes found for this encounter  UA neg except trace blood, lab UA in 1-2w suggested for hematuria if persistent    Hypothyroid stable    HLD stable on statin    htn stable, continue meds     Diagnoses and all orders for this visit:    Medicare annual wellness visit, subsequent    UTI symptoms  -     POCT urine dip auto non-scope    Other specified hypothyroidism  -     TSH, 3rd generation; Future    Prediabetes  -     Comprehensive metabolic panel; Future  -     Hemoglobin A1C; Future    Postmenopausal state  -     DXA bone density spine hip and pelvis; Future    Hematuria, unspecified type  -     UA/M w/rflx Culture, Routine; Future    Benign essential hypertension    Hyperlipidemia LDL goal <130        No follow-ups on file  Subjective:      Patient ID: Orlin Bauer is a 69 y o  female      Chief Complaint   Patient presents with   • Follow-up     6 month f/u-wmma       HPI  Taking all meds  Follows diet  Drinks water a lot  Staying active  No exercise program    Notes urine frequency  Some rlq pain  No dysuria  No hematuria    shingrix #2 on 1/11/22    The following portions of the patient's history were reviewed and updated as appropriate: allergies, current medications, past family history, past medical history, past social history, past surgical history and problem list     Review of Systems      Current Outpatient Medications   Medication Sig Dispense Refill   • amLODIPine (NORVASC) 5 mg tablet Take 1 tablet (5 mg total) by mouth daily 90 tablet 1   • atenolol (TENORMIN) 25 mg tablet take 1 tablet by mouth once daily 180 tablet 1   • atorvastatin (LIPITOR) 20 mg tablet Take 1 tablet (20 mg total) by mouth daily 90 tablet 0   • hydrochlorothiazide (HYDRODIURIL) 12 5 mg tablet Take 1 tablet (12 5 mg total) by mouth daily 90 tablet 0   • levothyroxine 88 mcg tablet Take 1 tablet (88 mcg total) by mouth daily 90 tablet 1   • losartan (COZAAR) 100 "MG tablet Take 1 tablet (100 mg total) by mouth daily 90 tablet 0     No current facility-administered medications for this visit  Objective:    /80 (BP Location: Left arm, Patient Position: Sitting, Cuff Size: Standard)   Pulse 84   Temp 97 9 °F (36 6 °C) (Temporal)   Resp 16   Ht 5' 3\" (1 6 m)   Wt 74 8 kg (165 lb)   SpO2 95%   BMI 29 23 kg/m²        Physical Exam  Vitals and nursing note reviewed  Constitutional:       Appearance: She is well-developed  HENT:      Head: Normocephalic  Eyes:      Conjunctiva/sclera: Conjunctivae normal    Cardiovascular:      Rate and Rhythm: Normal rate  Pulmonary:      Effort: Pulmonary effort is normal  No respiratory distress  Abdominal:      Palpations: Abdomen is soft  Musculoskeletal:         General: No deformity  Cervical back: Neck supple  Skin:     General: Skin is warm and dry  Neurological:      Mental Status: She is alert  Psychiatric:         Behavior: Behavior normal          Thought Content:  Thought content normal                 Martinez Skiff, DO    "

## 2023-06-29 NOTE — PROGRESS NOTES
Assessment and Plan:     Problem List Items Addressed This Visit        Active Problems    Benign essential hypertension    Hyperlipidemia LDL goal <130    Medicare annual wellness visit, subsequent - Primary    Prediabetes    Relevant Orders    Comprehensive metabolic panel    Hemoglobin A1C    Other specified hypothyroidism    Relevant Orders    TSH, 3rd generation    UTI symptoms    Relevant Orders    POCT urine dip auto non-scope (Completed)    Postmenopausal state    Relevant Orders    DXA bone density spine hip and pelvis    Hematuria    Relevant Orders    UA/M w/rflx Culture, Routine        Preventive health issues were discussed with patient, and age appropriate screening tests were ordered as noted in patient's After Visit Summary  Personalized health advice and appropriate referrals for health education or preventive services given if needed, as noted in patient's After Visit Summary  History of Present Illness:     Patient presents for a Medicare Wellness Visit    HPI   Patient Care Team:  Elizabeth Velez DO as PCP - General     Review of Systems:     Review of Systems     Problem List:     Patient Active Problem List   Diagnosis   • Arthropathy   • Benign essential hypertension   • Cataract   • Cervical radiculopathy   • GE reflux   • Hyperlipidemia LDL goal <130   • Other specified hypothyroidism   • Menopause   • Obesity (BMI 30-39  9)   • Osteopenia   • BMI 29 0-29 9,adult   • Medicare annual wellness visit, subsequent   • Colon cancer screening   • Immunization due   • Prediabetes   • Breast cancer screening by mammogram   • Low back strain   • UTI symptoms   • Postmenopausal state   • Hematuria      Past Medical and Surgical History:     Past Medical History:   Diagnosis Date   • Arthropathy 10/15/2010   • Disease of thyroid gland    • Hyperlipidemia    • Hypertension    • Onychomycosis 03/04/2005     Past Surgical History:   Procedure Laterality Date   • EYE SURGERY      bilateral IOL   • IA OPEN TX RADIAL HEAD/NECK FRACTURE PROSTHETIC Right 12/5/2016    Procedure: OPEN REDUCTION W/ INTERNAL FIXATION (ORIF) ELBOW;  Surgeon: Jaswinder Cuenca MD;  [facility];  Service: Orthopedics      Family History:     Family History   Problem Relation Age of Onset   • Diabetes Sister    • Breast cancer Sister 79   • No Known Problems Sister    • No Known Problems Sister    • No Known Problems Sister    • Alzheimer's disease Sister    • Cancer Mother         possibly gallbladder    • Heart disease Father    • Heart attack Father    • No Known Problems Brother    • No Known Problems Son    • No Known Problems Son    • No Known Problems Son    • No Known Problems Brother       Social History:     Social History     Socioeconomic History   • Marital status:      Spouse name: None   • Number of children: None   • Years of education: None   • Highest education level: None   Occupational History   • None   Tobacco Use   • Smoking status: Never     Passive exposure: Yes   • Smokeless tobacco: Never   Vaping Use   • Vaping Use: Never used   Substance and Sexual Activity   • Alcohol use: No   • Drug use: No   • Sexual activity: Not Currently     Partners: Male   Other Topics Concern   • None   Social History Narrative   • None     Social Determinants of Health     Financial Resource Strain: Low Risk  (6/29/2023)    Overall Financial Resource Strain (CARDIA)    • Difficulty of Paying Living Expenses: Not hard at all   Food Insecurity: Not on file   Transportation Needs: No Transportation Needs (6/29/2023)    PRAPARE - Transportation    • Lack of Transportation (Medical): No    • Lack of Transportation (Non-Medical):  No   Physical Activity: Not on file   Stress: Not on file   Social Connections: Not on file   Intimate Partner Violence: Not on file   Housing Stability: Not on file      Medications and Allergies:     Current Outpatient Medications   Medication Sig Dispense Refill   • amLODIPine (NORVASC) 5 mg tablet Take 1 tablet (5 mg total) by mouth daily 90 tablet 1   • atenolol (TENORMIN) 25 mg tablet take 1 tablet by mouth once daily 180 tablet 1   • atorvastatin (LIPITOR) 20 mg tablet Take 1 tablet (20 mg total) by mouth daily 90 tablet 0   • hydrochlorothiazide (HYDRODIURIL) 12 5 mg tablet Take 1 tablet (12 5 mg total) by mouth daily 90 tablet 0   • levothyroxine 88 mcg tablet Take 1 tablet (88 mcg total) by mouth daily 90 tablet 1   • losartan (COZAAR) 100 MG tablet Take 1 tablet (100 mg total) by mouth daily 90 tablet 0     No current facility-administered medications for this visit  Allergies   Allergen Reactions   • Amoxicillin Itching and Other (See Comments)     Other reaction(s): tingling   • Penicillins Rash      Immunizations:     Immunization History   Administered Date(s) Administered   • COVID-19 MODERNA VACC 0 25 ML IM BOOSTER 11/05/2021   • COVID-19 MODERNA VACC 0 5 ML IM 03/04/2021, 04/02/2021, 11/05/2021   • INFLUENZA 10/01/2021, 10/01/2021   • Influenza Split High Dose Preservative Free IM 09/20/2013, 10/15/2014, 10/20/2015, 10/04/2016, 09/22/2017   • Influenza, high dose seasonal 0 7 mL 09/20/2018, 10/11/2019, 10/09/2020   • Influenza, seasonal, injectable 11/09/2005   • Pneumococcal Conjugate 13-Valent 12/14/2015   • Pneumococcal Polysaccharide PPV23 10/17/2012   • Tdap 08/21/2007, 11/27/2016   • Zoster 10/17/2012   • Zoster Vaccine Recombinant 08/24/2021, 08/24/2021      Health Maintenance:         Topic Date Due   • Breast Cancer Screening: Mammogram  03/10/2024   • Hepatitis C Screening  Completed   • Colorectal Cancer Screening  Discontinued         Topic Date Due   • COVID-19 Vaccine (5 - Moderna series) 12/31/2021   • Influenza Vaccine (Season Ended) 09/01/2023      Medicare Screening Tests and Risk Assessments:     Andria Lebron is here for her Subsequent Wellness visit  Last Medicare Wellness visit information reviewed, patient interviewed and updates made to the record [facility]        Health Risk Assessment:   Patient rates overall health as very good  Patient feels that their physical health rating is same  Patient is very satisfied with their life  Eyesight was rated as same  Hearing was rated as same  Patient feels that their emotional and mental health rating is same  Patients states they are never, rarely angry  Patient states they are never, rarely unusually tired/fatigued  Pain experienced in the last 7 days has been none  Patient states that she has experienced no weight loss or gain in last 6 months  Depression Screening:   PHQ-2 Score: 0      Fall Risk Screening: In the past year, patient has experienced: no history of falling in past year      Urinary Incontinence Screening:   Patient has not leaked urine accidently in the last six months  Home Safety:  Patient does not have trouble with stairs inside or outside of their home  Patient has working smoke alarms and has working carbon monoxide detector  Home safety hazards include: none  Nutrition:   Current diet is Low Cholesterol and Low Saturated Fat  Medications:   Patient is currently taking over-the-counter supplements  OTC medications include: see medication list  Patient is able to manage medications  Activities of Daily Living (ADLs)/Instrumental Activities of Daily Living (IADLs):   Walk and transfer into and out of bed and chair?: Yes  Dress and groom yourself?: Yes    Bathe or shower yourself?: Yes    Feed yourself? Yes  Do your laundry/housekeeping?: Yes  Manage your money, pay your bills and track your expenses?: Yes  Make your own meals?: Yes    Do your own shopping?: Yes    Previous Hospitalizations:   Any hospitalizations or ED visits within the last 12 months?: No      Advance Care Planning:   Living will: Yes    Durable POA for healthcare:  Yes    Advanced directive: Yes    End of Life Decisions reviewed with patient: No      Cognitive Screening:   Provider or family/friend/caregiver concerned regarding "cognition?: No    PREVENTIVE SCREENINGS      Cardiovascular Screening:    General: Screening Not Indicated and History Lipid Disorder      Diabetes Screening:     General: Screening Current      Colorectal Cancer Screening:     General: Screening Not Indicated      Breast Cancer Screening:     General: Screening Current      Cervical Cancer Screening:    General: Screening Not Indicated      Osteoporosis Screening:    General: Risks and Benefits Discussed      Abdominal Aortic Aneurysm (AAA) Screening:        General: Screening Not Indicated      Lung Cancer Screening:     General: Screening Not Indicated      Hepatitis C Screening:    General: Screening Current    Screening, Brief Intervention, and Referral to Treatment (SBIRT)    Screening  Typical number of drinks in a day: 0  Typical number of drinks in a week: 0  Interpretation: Low risk drinking behavior  Single Item Drug Screening:  How often have you used an illegal drug (including marijuana) or a prescription medication for non-medical reasons in the past year? never    Single Item Drug Screen Score: 0  Interpretation: Negative screen for possible drug use disorder    Other Counseling Topics:   Car/seat belt/driving safety and regular weightbearing exercise  No results found       Physical Exam:     /80 (BP Location: Left arm, Patient Position: Sitting, Cuff Size: Standard)   Pulse 84   Temp 97 9 °F (36 6 °C) (Temporal)   Resp 16   Ht 5' 3\" (1 6 m)   Wt 74 8 kg (165 lb)   SpO2 95%   BMI 29 23 kg/m²     Physical Exam     Theremiriam Castro, DO  "

## 2023-06-29 NOTE — PATIENT INSTRUCTIONS
Medicare Preventive Visit Patient Instructions  Thank you for completing your Welcome to Medicare Visit or Medicare Annual Wellness Visit today  Your next wellness visit will be due in one year (6/29/2024)  The screening/preventive services that you may require over the next 5-10 years are detailed below  Some tests may not apply to you based off risk factors and/or age  Screening tests ordered at today's visit but not completed yet may show as past due  Also, please note that scanned in results may not display below  Preventive Screenings:  Service Recommendations Previous Testing/Comments   Colorectal Cancer Screening  * Colonoscopy    * Fecal Occult Blood Test (FOBT)/Fecal Immunochemical Test (FIT)  * Fecal DNA/Cologuard Test  * Flexible Sigmoidoscopy Age: 39-70 years old   Colonoscopy: every 10 years (may be performed more frequently if at higher risk)  OR  FOBT/FIT: every 1 year  OR  Cologuard: every 3 years  OR  Sigmoidoscopy: every 5 years  Screening may be recommended earlier than age 39 if at higher risk for colorectal cancer  Also, an individualized decision between you and your healthcare provider will decide whether screening between the ages of 74-80 would be appropriate  Colonoscopy: Not on file  FOBT/FIT: Not on file  Cologuard: Not on file  Sigmoidoscopy: Not on file          Breast Cancer Screening Age: 36 years old  Frequency: every 1-2 years  Not required if history of left and right mastectomy Mammogram: 03/10/2023        Cervical Cancer Screening Between the ages of 21-29, pap smear recommended once every 3 years  Between the ages of 33-67, can perform pap smear with HPV co-testing every 5 years     Recommendations may differ for women with a history of total hysterectomy, cervical cancer, or abnormal pap smears in past  Pap Smear: Not on file        Hepatitis C Screening Once for adults born between [facility]  More frequently in patients at high risk for Hepatitis C Hep C Antibody: 12/03/2018        Diabetes Screening 1-2 times per year if you're at risk for diabetes or have pre-diabetes Fasting glucose: No results in last 5 years (No results in last 5 years)  A1C: 5 8 % (6/14/2023)      Cholesterol Screening Once every 5 years if you don't have a lipid disorder  May order more often based on risk factors  Lipid panel: 06/14/2023          Other Preventive Screenings Covered by Medicare:  1  Abdominal Aortic Aneurysm (AAA) Screening: covered once if your at risk  You're considered to be at risk if you have a family history of AAA  2  Lung Cancer Screening: covers low dose CT scan once per year if you meet all of the following conditions: (1) Age 50-69; (2) No signs or symptoms of lung cancer; (3) Current smoker or have quit smoking within the last 15 years; (4) You have a tobacco smoking history of at least 20 pack years (packs per day multiplied by number of years you smoked); (5) You get a written order from a healthcare provider  3  Glaucoma Screening: covered annually if you're considered high risk: (1) You have diabetes OR (2) Family history of glaucoma OR (3)  aged 48 and older OR (3)  American aged 72 and older  3  Osteoporosis Screening: covered every 2 years if you meet one of the following conditions: (1) You're estrogen deficient and at risk for osteoporosis based off medical history and other findings; (2) Have a vertebral abnormality; (3) On glucocorticoid therapy for more than 3 months; (4) Have primary hyperparathyroidism; (5) On osteoporosis medications and need to assess response to drug therapy  · Last bone density test (DXA Scan): 01/12/2021   5  HIV Screening: covered annually if you're between the age of 15-65  Also covered annually if you are younger than 13 and older than 72 with risk factors for HIV infection  For pregnant patients, it is covered up to 3 times per pregnancy      Immunizations:  Immunization Recommendations   Influenza Vaccine Annual influenza vaccination during flu season is recommended for all persons aged >= 6 months who do not have contraindications   Pneumococcal Vaccine   * Pneumococcal conjugate vaccine = PCV13 (Prevnar 13), PCV15 (Vaxneuvance), PCV20 (Prevnar 20)  * Pneumococcal polysaccharide vaccine = PPSV23 (Pneumovax) Adults 25-60 years old: 1-3 doses may be recommended based on certain risk factors  Adults 72 years old: 1-2 doses may be recommended based off what pneumonia vaccine you previously received   Hepatitis B Vaccine 3 dose series if at intermediate or high risk (ex: diabetes, end stage renal disease, liver disease)   Tetanus (Td) Vaccine - COST NOT COVERED BY MEDICARE PART B Following completion of primary series, a booster dose should be given every 10 years to maintain immunity against tetanus  Td may also be given as tetanus wound prophylaxis  Tdap Vaccine - COST NOT COVERED BY MEDICARE PART B Recommended at least once for all adults  For pregnant patients, recommended with each pregnancy  Shingles Vaccine (Shingrix) - COST NOT COVERED BY MEDICARE PART B  2 shot series recommended in those aged 48 and above     Health Maintenance Due:      Topic Date Due   • Breast Cancer Screening: Mammogram  03/10/2024   • Hepatitis C Screening  Completed   • Colorectal Cancer Screening  Discontinued     Immunizations Due:      Topic Date Due   • COVID-19 Vaccine (5 - Moderna series) 12/31/2021   • Influenza Vaccine (Season Ended) 09/01/2023     Advance Directives   What are advance directives? Advance directives are legal documents that state your wishes and plans for medical care  These plans are made ahead of time in case you lose your ability to make decisions for yourself  Advance directives can apply to any medical decision, such as the treatments you want, and if you want to donate organs  What are the types of advance directives?   There are many types of advance directives, and each state has rules about how to use them  You may choose a combination of any of the following:  · Living will: This is a written record of the treatment you want  You can also choose which treatments you do not want, which to limit, and which to stop at a certain time  This includes surgery, medicine, IV fluid, and tube feedings  · Durable power of  for healthcare [place] [facility]): This is a written record that states who you want to make healthcare choices for you when you are unable to make them for yourself  This person, called a proxy, is usually a family member or a friend  You may choose more than 1 proxy  · Do not resuscitate (DNR) order:  A DNR order is used in case your heart stops beating or you stop breathing  It is a request not to have certain forms of treatment, such as CPR  A DNR order may be included in other types of advance directives  · Medical directive: This covers the care that you want if you are in a coma, near death, or unable to make decisions for yourself  You can list the treatments you want for each condition  Treatment may include pain medicine, surgery, blood transfusions, dialysis, IV or tube feedings, and a ventilator (breathing machine)  · Values history: This document has questions about your views, beliefs, and how you feel and think about life  This information can help others choose the care that you would choose  Why are advance directives important? An advance directive helps you control your care  Although spoken wishes may be used, it is better to have your wishes written down  Spoken wishes can be misunderstood, or not followed  Treatments may be given even if you do not want them  An advance directive may make it easier for your family to make difficult choices about your care  Weight Management   Why it is important to manage your weight:  Being overweight increases your risk of health conditions such as heart disease, high blood pressure, type 2 diabetes, and certain types of cancer   It can also increase your risk for osteoarthritis, sleep apnea, and other respiratory problems  Aim for a slow, steady weight loss  Even a small amount of weight loss can lower your risk of health problems  How to lose weight safely:  A safe and healthy way to lose weight is to eat fewer calories and get regular exercise  You can lose up about 1 pound a week by decreasing the number of calories you eat by 500 calories each day  Healthy meal plan for weight management:  A healthy meal plan includes a variety of foods, contains fewer calories, and helps you stay healthy  A healthy meal plan includes the following:  · Eat whole-grain foods more often  A healthy meal plan should contain fiber  Fiber is the part of grains, fruits, and vegetables that is not broken down by your body  Whole-grain foods are healthy and provide extra fiber in your diet  Some examples of whole-grain foods are whole-wheat breads and pastas, oatmeal, brown rice, and bulgur  · Eat a variety of vegetables every day  Include dark, leafy greens such as spinach, kale, zuleyka greens, and mustard greens  Eat yellow and orange vegetables such as carrots, sweet potatoes, and winter squash  · Eat a variety of fruits every day  Choose fresh or canned fruit (canned in its own juice or light syrup) instead of juice  Fruit juice has very little or no fiber  · Eat low-fat dairy foods  Drink fat-free (skim) milk or 1% milk  Eat fat-free yogurt and low-fat cottage cheese  Try low-fat cheeses such as mozzarella and other reduced-fat cheeses  · Choose meat and other protein foods that are low in fat  Choose beans or other legumes such as split peas or lentils  Choose fish, skinless poultry (chicken or turkey), or lean cuts of red meat (beef or pork)  Before you cook meat or poultry, cut off any visible fat  · Use less fat and oil  Try baking foods instead of frying them   Add less fat, such as margarine, sour cream, regular salad dressing and mayonnaise to foods  Eat fewer high-fat foods  Some examples of high-fat foods include french fries, doughnuts, ice cream, and cakes  · Eat fewer sweets  Limit foods and drinks that are high in sugar  This includes candy, cookies, regular soda, and sweetened drinks  Exercise:  Exercise at least 30 minutes per day on most days of the week  Some examples of exercise include walking, biking, dancing, and swimming  You can also fit in more physical activity by taking the stairs instead of the elevator or parking farther away from stores  Ask your healthcare provider about the best exercise plan for you  © Copyright [employer] 2018 Information is for End User's use only and may not be sold, redistributed or otherwise used for commercial purposes   All illustrations and images included in CareNotes® are the copyrighted property of A D A M , Inc  or [address] [place] [address]

## 2023-07-28 LAB
APPEARANCE UR: CLEAR
BACTERIA URNS QL MICRO: NORMAL
BILIRUB UR QL STRIP: NEGATIVE
CASTS URNS QL MICRO: NORMAL /LPF
COLOR UR: YELLOW
EPI CELLS #/AREA URNS HPF: NORMAL /HPF (ref 0–10)
GLUCOSE UR QL: NEGATIVE
HGB UR QL STRIP: NEGATIVE
KETONES UR QL STRIP: NEGATIVE
LEUKOCYTE ESTERASE UR QL STRIP: NEGATIVE
MICRO URNS: ABNORMAL
MICRO URNS: ABNORMAL
NITRITE UR QL STRIP: NEGATIVE
PH UR STRIP: 7.5 [PH] (ref 5–7.5)
PROT UR QL STRIP: NEGATIVE
RBC #/AREA URNS HPF: NORMAL /HPF (ref 0–2)
SL AMB URINALYSIS REFLEX: ABNORMAL
SP GR UR: <=1.005 (ref 1–1.03)
UROBILINOGEN UR STRIP-ACNC: 0.2 MG/DL (ref 0.2–1)
WBC #/AREA URNS HPF: NORMAL /HPF (ref 0–5)

## 2023-07-31 DIAGNOSIS — I10 BENIGN ESSENTIAL HYPERTENSION: ICD-10-CM

## 2023-07-31 RX ORDER — ATENOLOL 25 MG/1
25 TABLET ORAL DAILY
Qty: 90 TABLET | Refills: 1 | Status: SHIPPED | OUTPATIENT
Start: 2023-07-31

## 2023-08-21 DIAGNOSIS — I10 BENIGN ESSENTIAL HYPERTENSION: ICD-10-CM

## 2023-08-21 DIAGNOSIS — E78.5 HYPERLIPIDEMIA LDL GOAL <130: ICD-10-CM

## 2023-08-21 RX ORDER — HYDROCHLOROTHIAZIDE 12.5 MG/1
12.5 TABLET ORAL DAILY
Qty: 90 TABLET | Refills: 0 | Status: SHIPPED | OUTPATIENT
Start: 2023-08-21

## 2023-08-21 RX ORDER — LOSARTAN POTASSIUM 100 MG/1
100 TABLET ORAL DAILY
Qty: 90 TABLET | Refills: 0 | Status: SHIPPED | OUTPATIENT
Start: 2023-08-21

## 2023-08-21 RX ORDER — ATORVASTATIN CALCIUM 20 MG/1
20 TABLET, FILM COATED ORAL DAILY
Qty: 90 TABLET | Refills: 0 | Status: SHIPPED | OUTPATIENT
Start: 2023-08-21

## 2023-09-11 DIAGNOSIS — I10 BENIGN ESSENTIAL HYPERTENSION: ICD-10-CM

## 2023-09-11 DIAGNOSIS — E03.9 HYPOTHYROIDISM, UNSPECIFIED TYPE: ICD-10-CM

## 2023-09-11 RX ORDER — LEVOTHYROXINE SODIUM 88 UG/1
88 TABLET ORAL DAILY
Qty: 90 TABLET | Refills: 0 | Status: SHIPPED | OUTPATIENT
Start: 2023-09-11

## 2023-09-11 RX ORDER — AMLODIPINE BESYLATE 5 MG/1
5 TABLET ORAL DAILY
Qty: 90 TABLET | Refills: 0 | Status: SHIPPED | OUTPATIENT
Start: 2023-09-11

## 2023-09-21 ENCOUNTER — OFFICE VISIT (OUTPATIENT)
Dept: FAMILY MEDICINE CLINIC | Facility: CLINIC | Age: 78
End: 2023-09-21
Payer: COMMERCIAL

## 2023-09-21 VITALS
HEIGHT: 63 IN | RESPIRATION RATE: 16 BRPM | TEMPERATURE: 98.2 F | DIASTOLIC BLOOD PRESSURE: 90 MMHG | SYSTOLIC BLOOD PRESSURE: 160 MMHG | WEIGHT: 160.8 LBS | BODY MASS INDEX: 28.49 KG/M2 | HEART RATE: 63 BPM

## 2023-09-21 DIAGNOSIS — M54.50 ACUTE LOW BACK PAIN WITHOUT SCIATICA, UNSPECIFIED BACK PAIN LATERALITY: Primary | ICD-10-CM

## 2023-09-21 LAB
SL AMB  POCT GLUCOSE, UA: NEGATIVE
SL AMB LEUKOCYTE ESTERASE,UA: NEGATIVE
SL AMB POCT BILIRUBIN,UA: NEGATIVE
SL AMB POCT BLOOD,UA: NORMAL
SL AMB POCT CLARITY,UA: CLEAR
SL AMB POCT COLOR,UA: NORMAL
SL AMB POCT KETONES,UA: NEGATIVE
SL AMB POCT NITRITE,UA: NEGATIVE
SL AMB POCT PH,UA: 7
SL AMB POCT SPECIFIC GRAVITY,UA: 1.01
SL AMB POCT URINE PROTEIN: NEGATIVE
SL AMB POCT UROBILINOGEN: NORMAL

## 2023-09-21 PROCEDURE — 99213 OFFICE O/P EST LOW 20 MIN: CPT | Performed by: FAMILY MEDICINE

## 2023-09-21 PROCEDURE — 81003 URINALYSIS AUTO W/O SCOPE: CPT | Performed by: FAMILY MEDICINE

## 2023-09-21 RX ORDER — PREDNISONE 20 MG/1
TABLET ORAL
Qty: 32 TABLET | Refills: 0 | Status: SHIPPED | OUTPATIENT
Start: 2023-09-21

## 2023-09-21 RX ORDER — CYCLOBENZAPRINE HCL 5 MG
5 TABLET ORAL
Qty: 21 TABLET | Refills: 0 | Status: SHIPPED | OUTPATIENT
Start: 2023-09-21 | End: 2023-10-12

## 2023-09-21 NOTE — PROGRESS NOTES
Assessment/Plan:    1. Acute low back pain without sciatica, unspecified back pain laterality  -     POCT urine dip auto non-scope  -     Urine culture  -     XR ribs 2 vw right; Future; Expected date: 09/21/2023  -     predniSONE 20 mg tablet; 4 tabs for three days, 3 tabs for three days, 2 tabs for three days, 1 tab for three days, 1/2 tab for 4 days  -     cyclobenzaprine (FLEXERIL) 5 mg tablet; Take 1 tablet (5 mg total) by mouth daily at bedtime for 21 days            There are no Patient Instructions on file for this visit. No follow-ups on file. Subjective:      Patient ID: Nathaniel Bales is a 69 y.o. female. Chief Complaint   Patient presents with   • Back Pain     Lower back pain, started 1 week ago Steven Peterson LPN     • Flank Pain     Right side flank pain, started 2 days ago       Pt states she does a lot of weedwacking, push mowing riding tractor etc  Last week staretd with lower back pain. Last few days started to get it around the kidney area and she is a little nauseated. No rad of the pain  Straight across the lower      The following portions of the patient's history were reviewed and updated as appropriate: allergies, current medications, past family history, past medical history, past social history, past surgical history and problem list.    Review of Systems   Musculoskeletal: Positive for arthralgias and back pain.          Current Outpatient Medications   Medication Sig Dispense Refill   • amLODIPine (NORVASC) 5 mg tablet Take 1 tablet (5 mg total) by mouth daily 90 tablet 0   • atenolol (TENORMIN) 25 mg tablet Take 1 tablet (25 mg total) by mouth daily 90 tablet 1   • atorvastatin (LIPITOR) 20 mg tablet Take 1 tablet (20 mg total) by mouth daily 90 tablet 0   • cyclobenzaprine (FLEXERIL) 5 mg tablet Take 1 tablet (5 mg total) by mouth daily at bedtime for 21 days 21 tablet 0   • hydrochlorothiazide (HYDRODIURIL) 12.5 mg tablet Take 1 tablet (12.5 mg total) by mouth daily 90 tablet 0   • levothyroxine 88 mcg tablet Take 1 tablet (88 mcg total) by mouth daily 90 tablet 0   • losartan (COZAAR) 100 MG tablet Take 1 tablet (100 mg total) by mouth daily 90 tablet 0   • predniSONE 20 mg tablet 4 tabs for three days, 3 tabs for three days, 2 tabs for three days, 1 tab for three days, 1/2 tab for 4 days 32 tablet 0     No current facility-administered medications for this visit.        Objective:    /90   Pulse 63   Temp 98.2 °F (36.8 °C)   Resp 16   Ht 5' 3" (1.6 m)   Wt 72.9 kg (160 lb 12.8 oz)   BMI 28.48 kg/m²        Physical Exam  Musculoskeletal:      Comments: pvms nhi Mallory DO

## 2023-09-23 LAB
BACTERIA UR CULT: NORMAL
Lab: NO GROWTH

## 2023-10-25 ENCOUNTER — HOSPITAL ENCOUNTER (OUTPATIENT)
Dept: RADIOLOGY | Facility: HOSPITAL | Age: 78
Discharge: HOME | End: 2023-10-25
Attending: FAMILY MEDICINE
Payer: COMMERCIAL

## 2023-10-25 VITALS — BODY MASS INDEX: 29.44 KG/M2 | HEIGHT: 62 IN | WEIGHT: 160 LBS

## 2023-10-25 DIAGNOSIS — Z78.0 POSTMENOPAUSAL STATE: ICD-10-CM

## 2023-10-25 PROCEDURE — 77080 DXA BONE DENSITY AXIAL: CPT

## 2023-11-17 DIAGNOSIS — E78.5 HYPERLIPIDEMIA LDL GOAL <130: ICD-10-CM

## 2023-11-17 DIAGNOSIS — I10 BENIGN ESSENTIAL HYPERTENSION: ICD-10-CM

## 2023-11-17 RX ORDER — HYDROCHLOROTHIAZIDE 12.5 MG/1
12.5 TABLET ORAL DAILY
Qty: 90 TABLET | Refills: 1 | Status: SHIPPED | OUTPATIENT
Start: 2023-11-17

## 2023-11-17 RX ORDER — LOSARTAN POTASSIUM 100 MG/1
100 TABLET ORAL DAILY
Qty: 90 TABLET | Refills: 1 | Status: SHIPPED | OUTPATIENT
Start: 2023-11-17

## 2023-11-17 RX ORDER — ATORVASTATIN CALCIUM 20 MG/1
20 TABLET, FILM COATED ORAL DAILY
Qty: 90 TABLET | Refills: 1 | Status: SHIPPED | OUTPATIENT
Start: 2023-11-17

## 2023-11-27 ENCOUNTER — TELEPHONE (OUTPATIENT)
Age: 78
End: 2023-11-27

## 2023-11-27 NOTE — TELEPHONE ENCOUNTER
Patient wanted to make us aware that she tested positive for covid 11/27/23. Patient will quarantine for 5 days. However, she requested to know if there is anything else she should be doing? Please contact patient and advise. Thank you for your assistance.

## 2023-11-29 NOTE — TELEPHONE ENCOUNTER
Tested positive at home  Today is day 3  No sob  No leg swelling or cp  Taking otc  Eating/drinking ok  Discussed paxlovid or steroids  Declines  Rest, supportive

## 2023-12-11 DIAGNOSIS — E03.9 HYPOTHYROIDISM, UNSPECIFIED TYPE: ICD-10-CM

## 2023-12-11 RX ORDER — LEVOTHYROXINE SODIUM 88 UG/1
88 TABLET ORAL DAILY
Qty: 90 TABLET | Refills: 1 | Status: SHIPPED | OUTPATIENT
Start: 2023-12-11

## 2023-12-13 LAB
ALBUMIN SERPL-MCNC: 4.2 G/DL (ref 3.8–4.8)
ALBUMIN/GLOB SERPL: 1.6 {RATIO} (ref 1.2–2.2)
ALP SERPL-CCNC: 68 IU/L (ref 44–121)
ALT SERPL-CCNC: 25 IU/L (ref 0–32)
AST SERPL-CCNC: 16 IU/L (ref 0–40)
BILIRUB SERPL-MCNC: 0.5 MG/DL (ref 0–1.2)
BUN SERPL-MCNC: 11 MG/DL (ref 8–27)
BUN/CREAT SERPL: 13 (ref 12–28)
CALCIUM SERPL-MCNC: 9.4 MG/DL (ref 8.7–10.3)
CHLORIDE SERPL-SCNC: 98 MMOL/L (ref 96–106)
CO2 SERPL-SCNC: 25 MMOL/L (ref 20–29)
CREAT SERPL-MCNC: 0.83 MG/DL (ref 0.57–1)
EGFR: 73 ML/MIN/1.73
GLOBULIN SER-MCNC: 2.6 G/DL (ref 1.5–4.5)
GLUCOSE SERPL-MCNC: 105 MG/DL (ref 70–99)
HBA1C MFR BLD: 5.9 % (ref 4.8–5.6)
POTASSIUM SERPL-SCNC: 5 MMOL/L (ref 3.5–5.2)
PROT SERPL-MCNC: 6.8 G/DL (ref 6–8.5)
SODIUM SERPL-SCNC: 138 MMOL/L (ref 134–144)
TSH SERPL DL<=0.005 MIU/L-ACNC: 2.21 UIU/ML (ref 0.45–4.5)

## 2023-12-25 PROBLEM — R39.9 UTI SYMPTOMS: Status: RESOLVED | Noted: 2023-06-28 | Resolved: 2023-12-25

## 2023-12-27 ENCOUNTER — OFFICE VISIT (OUTPATIENT)
Dept: FAMILY MEDICINE CLINIC | Facility: CLINIC | Age: 78
End: 2023-12-27
Payer: COMMERCIAL

## 2023-12-27 VITALS
DIASTOLIC BLOOD PRESSURE: 80 MMHG | SYSTOLIC BLOOD PRESSURE: 140 MMHG | HEIGHT: 62 IN | HEART RATE: 64 BPM | WEIGHT: 160 LBS | BODY MASS INDEX: 29.44 KG/M2 | OXYGEN SATURATION: 98 % | TEMPERATURE: 97.2 F | RESPIRATION RATE: 18 BRPM

## 2023-12-27 DIAGNOSIS — Z13.83 SCREENING FOR CARDIOVASCULAR, RESPIRATORY, AND GENITOURINARY DISEASES: ICD-10-CM

## 2023-12-27 DIAGNOSIS — R05.2 SUBACUTE COUGH: ICD-10-CM

## 2023-12-27 DIAGNOSIS — Z12.31 BREAST CANCER SCREENING BY MAMMOGRAM: Primary | ICD-10-CM

## 2023-12-27 DIAGNOSIS — Z13.6 SCREENING FOR CARDIOVASCULAR, RESPIRATORY, AND GENITOURINARY DISEASES: ICD-10-CM

## 2023-12-27 DIAGNOSIS — E78.5 HYPERLIPIDEMIA LDL GOAL <130: ICD-10-CM

## 2023-12-27 DIAGNOSIS — R73.03 PREDIABETES: ICD-10-CM

## 2023-12-27 DIAGNOSIS — Z13.1 SCREENING FOR DIABETES MELLITUS (DM): ICD-10-CM

## 2023-12-27 DIAGNOSIS — E03.8 OTHER SPECIFIED HYPOTHYROIDISM: ICD-10-CM

## 2023-12-27 DIAGNOSIS — Z13.89 SCREENING FOR CARDIOVASCULAR, RESPIRATORY, AND GENITOURINARY DISEASES: ICD-10-CM

## 2023-12-27 DIAGNOSIS — I10 BENIGN ESSENTIAL HYPERTENSION: ICD-10-CM

## 2023-12-27 PROCEDURE — 99214 OFFICE O/P EST MOD 30 MIN: CPT | Performed by: FAMILY MEDICINE

## 2023-12-27 RX ORDER — ATENOLOL 25 MG/1
25 TABLET ORAL DAILY
Qty: 90 TABLET | Refills: 1 | Status: SHIPPED | OUTPATIENT
Start: 2023-12-27

## 2023-12-27 RX ORDER — METHYLPREDNISOLONE 4 MG/1
TABLET ORAL
Qty: 21 EACH | Refills: 0 | Status: SHIPPED | OUTPATIENT
Start: 2023-12-27

## 2023-12-27 RX ORDER — AMLODIPINE BESYLATE 5 MG/1
5 TABLET ORAL DAILY
Qty: 90 TABLET | Refills: 1 | Status: SHIPPED | OUTPATIENT
Start: 2023-12-27

## 2023-12-27 NOTE — PROGRESS NOTES
Assessment/Plan:    No problem-specific Assessment & Plan notes found for this encounter.    Htn borderline but ok  Some stress from 1st anniversary of 's passing  Work on diet and wt loss    Hypothyroid stable  F/u q6    Prediabetes aware  Work on calories    Right ETD  Short course medrol  Risks aware    HLD stable  Risk reduction use aware    Residual cough  Post covid  Medrol pack  F/u if no better     Diagnoses and all orders for this visit:    Breast cancer screening by mammogram  -     Mammo screening bilateral w 3d & cad; Future    Benign essential hypertension  -     amLODIPine (NORVASC) 5 mg tablet; Take 1 tablet (5 mg total) by mouth daily  -     atenolol (TENORMIN) 25 mg tablet; Take 1 tablet (25 mg total) by mouth daily    Other specified hypothyroidism  -     TSH, 3rd generation; Future    Prediabetes  -     Comprehensive metabolic panel; Future  -     Hemoglobin A1C; Future    Screening for diabetes mellitus (DM)    Screening for cardiovascular, respiratory, and genitourinary diseases    Hyperlipidemia LDL goal <130  -     Lipid Panel with Direct LDL reflex; Future    Subacute cough  -     methylPREDNISolone 4 MG tablet therapy pack; Use as directed on package        Return in about 6 months (around 6/27/2024).    Subjective:      Patient ID: Rosanne Stallings is a 78 y.o. female.    Chief Complaint   Patient presents with    Follow-up     6 months  [id number]       HPI  Reminiscing about 's dead last year  Taking all his meds  Eating better, less fat  More veggies  Limits carbs  No exercise  Some wt loss    The following portions of the patient's history were reviewed and updated as appropriate: allergies, current medications, past family history, past medical history, past social history, past surgical history and problem list.    Review of Systems   Respiratory:  Negative for shortness of breath.    Cardiovascular:  Negative for chest pain.         Current Outpatient Medications  "  Medication Sig Dispense Refill    amLODIPine (NORVASC) 5 mg tablet Take 1 tablet (5 mg total) by mouth daily 90 tablet 1    atenolol (TENORMIN) 25 mg tablet Take 1 tablet (25 mg total) by mouth daily 90 tablet 1    atorvastatin (LIPITOR) 20 mg tablet Take 1 tablet (20 mg total) by mouth daily 90 tablet 1    hydrochlorothiazide (HYDRODIURIL) 12.5 mg tablet Take 1 tablet (12.5 mg total) by mouth daily 90 tablet 1    levothyroxine 88 mcg tablet take 1 tablet by mouth once daily 90 tablet 1    losartan (COZAAR) 100 MG tablet Take 1 tablet (100 mg total) by mouth daily 90 tablet 1    methylPREDNISolone 4 MG tablet therapy pack Use as directed on package 21 each 0     No current facility-administered medications for this visit.       Objective:    /80   Pulse 64   Temp (!) 97.2 °F (36.2 °C)   Resp 18   Ht 5' 2\" (1.575 m)   Wt 72.6 kg (160 lb)   SpO2 98%   BMI 29.26 kg/m²        Physical Exam  Vitals and nursing note reviewed.   Constitutional:       General: She is not in acute distress.     Appearance: She is well-developed. She is not ill-appearing.   HENT:      Head: Normocephalic.      Right Ear: Tympanic membrane and ear canal normal.      Left Ear: Tympanic membrane and ear canal normal.      Nose: No congestion.   Eyes:      General: No scleral icterus.     Conjunctiva/sclera: Conjunctivae normal.   Neck:      Vascular: No carotid bruit.   Cardiovascular:      Rate and Rhythm: Normal rate and regular rhythm.      Heart sounds: No murmur heard.  Pulmonary:      Effort: Pulmonary effort is normal. No respiratory distress.      Breath sounds: No wheezing.   Abdominal:      General: There is no distension.      Palpations: Abdomen is soft.      Tenderness: There is no abdominal tenderness.   Musculoskeletal:         General: No deformity.      Cervical back: Neck supple.      Right lower leg: No edema.      Left lower leg: No edema.   Skin:     General: Skin is warm and dry.      Coloration: Skin is not " pale.   Neurological:      Mental Status: She is alert.      Motor: No weakness.      Gait: Gait normal.   Psychiatric:         Mood and Affect: Mood normal.         Behavior: Behavior normal.         Thought Content: Thought content normal.       BMI Counseling: Body mass index is 29.26 kg/m². The BMI is above normal. Nutrition recommendations include decreasing portion sizes and moderation in carbohydrate intake. Exercise recommendations include exercising 3-5 times per week. No pharmacotherapy was ordered. Rationale for BMI follow-up plan is due to patient being overweight or obese.     Depression Screening and Follow-up Plan: Patient was screened for depression during today's encounter. They screened negative with a PHQ-2 score of 0.             Lit Mcnally DO

## 2024-01-08 ENCOUNTER — OFFICE VISIT (OUTPATIENT)
Dept: FAMILY MEDICINE CLINIC | Facility: CLINIC | Age: 79
End: 2024-01-08
Payer: COMMERCIAL

## 2024-01-08 VITALS
TEMPERATURE: 98.3 F | BODY MASS INDEX: 28.89 KG/M2 | DIASTOLIC BLOOD PRESSURE: 70 MMHG | WEIGHT: 157 LBS | HEART RATE: 64 BPM | RESPIRATION RATE: 16 BRPM | SYSTOLIC BLOOD PRESSURE: 132 MMHG | HEIGHT: 62 IN

## 2024-01-08 DIAGNOSIS — E78.5 HYPERLIPIDEMIA LDL GOAL <130: ICD-10-CM

## 2024-01-08 DIAGNOSIS — R42 VERTIGO: Primary | ICD-10-CM

## 2024-01-08 DIAGNOSIS — I10 BENIGN ESSENTIAL HYPERTENSION: ICD-10-CM

## 2024-01-08 PROCEDURE — 99214 OFFICE O/P EST MOD 30 MIN: CPT | Performed by: NURSE PRACTITIONER

## 2024-01-08 RX ORDER — MECLIZINE HCL 12.5 MG/1
12.5 TABLET ORAL EVERY 12 HOURS PRN
Qty: 30 TABLET | Refills: 0 | Status: SHIPPED | OUTPATIENT
Start: 2024-01-08

## 2024-01-08 NOTE — PATIENT INSTRUCTIONS
Dizziness   AMBULATORY CARE:   Dizziness  is a feeling of being off balance or unsteady. Common causes of dizziness are an inner ear fluid imbalance or a lack of oxygen in your blood. Dizziness may be acute (lasts 3 days or less) or chronic (lasts longer than 3 days). You may have dizzy spells that last from seconds to a few hours.   Common symptoms that may happen with dizziness:   A feeling that your surroundings are moving even though you are standing still    Ringing in your ears or hearing loss     Feeling faint or lightheaded     Weakness or unsteadiness     Double vision or eye movements you cannot control    Nausea or vomiting     Confusion    Seek care immediately if:   You have a headache and a stiff neck.    You have shaking chills and a fever.     You vomit over and over with no relief.     Your vomit or bowel movements are red or black.     You have pain in your chest, back, or abdomen.     You have numbness, especially in your face, arms, or legs.     You have trouble moving your arms or legs.     You are confused.    Contact your healthcare provider if:   You have a fever.     Your symptoms do not get better with treatment.     You have questions or concerns about your condition or care.    Treatment for dizziness  depends on the cause. Your healthcare provider may give you oxygen or medicines to decrease your dizziness and nausea. Your provider may also refer you to a specialist. You may need to be admitted to the hospital for treatment.  Manage your symptoms:   Do not drive  or operate heavy machinery when you are dizzy.     Get up slowly  from sitting or lying down.     Drink plenty of liquids.  Liquids help prevent dehydration. Ask how much liquid to drink each day and which liquids are best for you.    Follow up with your doctor as directed:  Write down your questions so you remember to ask them during your visits.  © Copyright Merative 2023 Information is for End User's use only and may not be  sold, redistributed or otherwise used for commercial purposes.  The above information is an  only. It is not intended as medical advice for individual conditions or treatments. Talk to your doctor, nurse or pharmacist before following any medical regimen to see if it is safe and effective for you.  Meclizine (By mouth)   Meclizine ([id number]-[id number]-[id number])  Treats vertigo and symptoms of motion sickness.   Brand Name(s): Antivert, Dramamine, [facility] Neighbor Motion Sickness Relief, [facility] Motion Sickness Relief, Good Sense Motion Sickness II, Leader Motion Sickness Relief, Medi-Meclizine, Motion Relief, Motion Sickness, Motion Sickness Relief, Motion-Time, Quality Choice Travel Ease, Sunmark Motion Sickness, Travel Sickness, Travel-Ease   There may be other brand names for this medicine.  When This Medicine Should Not Be Used:   This medicine is not right for everyone. Do not use it if you had an allergic reaction to meclizine.  How to Use This Medicine:   Capsule, Tablet, Chewable Tablet  Your doctor will tell you how much medicine to use. Do not use more than directed.  Chewable tablet: Chew or crush the tablet completely before swallowing. Do not swallow chewable tablets whole.  Non-chewable tablet: Swallow the non-chewable tablet whole. Do not crush, chew, or break it.  Motion sickness: Take this medicine at least 1 hour before travel if you are using it to prevent motion sickness. One dose of this medicine should prevent motion sickness for 24 hours.  Missed dose: Take a dose as soon as you remember. If it is almost time for your next dose, wait until then and take a regular dose. Do not take extra medicine to make up for a missed dose. If you use this medicine for motion sickness, take it only as needed.  Store the medicine in a closed container at room temperature, away from heat, moisture, and direct light.  Drugs and Foods to Avoid:   Ask your doctor or pharmacist before using any other  medicine, including over-the-counter medicines, vitamins, and herbal products.  Do not drink alcohol while you are using this medicine.  Tell your doctor if you use anything else that makes you sleepy. Some examples are allergy medicine, narcotic pain medicine, and alcohol.  Warnings While Using This Medicine:   Tell your doctor if you are pregnant or breastfeeding, or if you have kidney disease, liver disease, asthma, enlarged prostate, glaucoma, heart failure, or trouble urinating.  This medicine may make you drowsy. Do not drive or do anything else that could be dangerous until you know how this medicine affects you.  Your doctor will check your progress and the effects of this medicine at regular visits. Keep all appointments.  Keep all medicine out of the reach of children. Never share your medicine with anyone.  Possible Side Effects While Using This Medicine:   Call your doctor right away if you notice any of these side effects:  Allergic reaction: Itching or hives, swelling in your face or hands, swelling or tingling in your mouth or throat, chest tightness, trouble breathing  If you notice these less serious side effects, talk with your doctor:   Blurred vision  Drowsiness  Dry mouth  Headache  If you notice other side effects that you think are caused by this medicine, tell your doctor.   Call your doctor for medical advice about side effects. You may report side effects to FDA at 2-983-FDA-5315  © Copyright Merative 2023 Information is for End User's use only and may not be sold, redistributed or otherwise used for commercial purposes.  The above information is an  only. It is not intended as medical advice for individual conditions or treatments. Talk to your doctor, nurse or pharmacist before following any medical regimen to see if it is safe and effective for you.

## 2024-01-08 NOTE — PROGRESS NOTES
"Assessment/Plan:    1. Vertigo  Comments:  BP stable and neuro exam WNL, will start meclizine and PRN and will follow in balance center and will go to ER for any worsening of symptoms  Orders:  -     meclizine (ANTIVERT) 12.5 MG tablet; Take 1 tablet (12.5 mg total) by mouth every 12 (twelve) hours as needed for dizziness  -     Ambulatory referral to Physical Therapy; Future    2. Benign essential hypertension  Assessment & Plan:  Stable with current regimen      3. Hyperlipidemia LDL goal <130  Assessment & Plan:  Complaint with statin and tolerating it well          Patient Instructions:  Supportive care discussed and advised.  Advised to RTO for any worsening and no improvement.   Follow up for no improvement and worsening of conditions.  Patient advised and educated when to see immediate medical care.      Return if symptoms worsen or fail to improve.      Future Appointments   Date Time Provider Department Center   6/17/2024  9:30 AM DO CARMINE Castillo [facility]           Subjective:      Patient ID: Rosanne Stallings is a 78 y.o. female.    Chief Complaint   Patient presents with   • Fatigue     Fatigue and almost passed out and fell yesterday  Symone King CMA    • Dizziness     Vertigo - world was spinning         Vitals:  /70   Pulse 64   Temp 98.3 °F (36.8 °C)   Resp 16   Ht 5' 2\" (1.575 m)   Wt 71.2 kg (157 lb)   BMI 28.72 kg/m²     HPI  Here today with son.  Stated that yesterday felt vertigo and almost passed out then got better and later when she was putting her head backwards also felt vertigo again but today feeling ok. Denies any headache and weakness. Stated that after vertigo yesterday felt her head was heavy. Denies falls and injury. Stated that had bad vertigo in 2014.  Complaint with medications and tolerating it well          PHQ-2/9 Depression Screening    Little interest or pleasure in doing things: 0 - not at all  Feeling down, depressed, or hopeless: 0 - not at " all  PHQ-2 Score: 0  PHQ-2 Interpretation: Negative depression screen             The following portions of the patient's history were reviewed and updated as appropriate: allergies, current medications, past family history, past medical history, past social history, past surgical history and problem list.      Review of Systems   Constitutional:  Positive for fatigue.   HENT: Negative.     Respiratory: Negative.     Cardiovascular: Negative.    Gastrointestinal: Negative.    Genitourinary: Negative.    Skin: Negative.    Neurological:  Positive for dizziness.        As noted in HPI       Psychiatric/Behavioral: Negative.           Objective:    Social History     Tobacco Use   Smoking Status Never   • Passive exposure: Yes   Smokeless Tobacco Never       Allergies:   Allergies   Allergen Reactions   • Amoxicillin Itching and Other (See Comments)     Other reaction(s): tingling   • Penicillins Rash         Current Outpatient Medications   Medication Sig Dispense Refill   • amLODIPine (NORVASC) 5 mg tablet Take 1 tablet (5 mg total) by mouth daily 90 tablet 1   • atenolol (TENORMIN) 25 mg tablet Take 1 tablet (25 mg total) by mouth daily 90 tablet 1   • atorvastatin (LIPITOR) 20 mg tablet Take 1 tablet (20 mg total) by mouth daily 90 tablet 1   • hydrochlorothiazide (HYDRODIURIL) 12.5 mg tablet Take 1 tablet (12.5 mg total) by mouth daily 90 tablet 1   • levothyroxine 88 mcg tablet take 1 tablet by mouth once daily 90 tablet 1   • losartan (COZAAR) 100 MG tablet Take 1 tablet (100 mg total) by mouth daily 90 tablet 1   • meclizine (ANTIVERT) 12.5 MG tablet Take 1 tablet (12.5 mg total) by mouth every 12 (twelve) hours as needed for dizziness 30 tablet 0     No current facility-administered medications for this visit.          Physical Exam  Vitals reviewed.   Constitutional:       Appearance: Normal appearance. She is well-developed.   HENT:      Head: Normocephalic.      Right Ear: Tympanic membrane, ear canal and  external ear normal.      Left Ear: Tympanic membrane, ear canal and external ear normal.      Nose: Nose normal.      Right Sinus: No maxillary sinus tenderness or frontal sinus tenderness.      Left Sinus: No maxillary sinus tenderness or frontal sinus tenderness.      Mouth/Throat:      Mouth: No oral lesions.      Pharynx: No oropharyngeal exudate or posterior oropharyngeal erythema.   Cardiovascular:      Rate and Rhythm: Normal rate and regular rhythm.      Heart sounds: Normal heart sounds.   Pulmonary:      Effort: Pulmonary effort is normal.      Breath sounds: Normal breath sounds.   Musculoskeletal:         General: Normal range of motion.      Cervical back: Neck supple.   Lymphadenopathy:      Cervical:      Right cervical: No superficial or posterior cervical adenopathy.     Left cervical: No superficial or posterior cervical adenopathy.   Skin:     General: Skin is warm and dry.   Neurological:      General: No focal deficit present.      Mental Status: She is alert and oriented to person, place, and time.      GCS: GCS eye subscore is 4. GCS verbal subscore is 5. GCS motor subscore is 6.      Cranial Nerves: No facial asymmetry.      Motor: No weakness.      Coordination: Coordination normal.      Gait: Gait normal.   Psychiatric:         Behavior: Behavior normal.         Thought Content: Thought content normal.         Judgment: Judgment normal.                     PATRIC Cavanaugh

## 2024-01-11 ENCOUNTER — EVALUATION (OUTPATIENT)
Facility: CLINIC | Age: 79
End: 2024-01-11
Payer: COMMERCIAL

## 2024-01-11 DIAGNOSIS — R42 VERTIGO: Primary | ICD-10-CM

## 2024-01-11 PROCEDURE — 97161 PT EVAL LOW COMPLEX 20 MIN: CPT | Performed by: PHYSICAL THERAPIST

## 2024-01-11 NOTE — PROGRESS NOTES
PT Evaluation          POC expires Auth Status Total   Visits  Start date  Expiration date PT/OT + Visit Limit? Co-Insurance   24 SUBMITTED  ON 24 - CW PEND 24  bOMN No and $30 Co-pay                                           Visit/Unit Tracking  AUTH Status: SUBMITTED Date               Visits  Authed: PEND Used 1               Remaining                       Today's date: 2024  Patient name: Rosanne Stallings  : 1945  MRN: [id number]  Referring provider: Jackie Barragan CRNP  Dx:   Encounter Diagnosis     ICD-10-CM    1. Vertigo  R42 Ambulatory referral to Physical Therapy    BP stable and neuro exam WNL, will start meclizine and PRN and will follow in balance center and will go to ER for any worsening of symptoms            Assessment  Assessment details: Patient is a 78 y.o. Female who presents to skilled outpatient PT with spinning dizziness impacting her ability to perform ADLs and safely. Cervical spine integrity intact per normal and negative results of mVBI, Sharp Christa, and Alar Stability Tests respectively. Patient displayed R upward torisonal nystagmus with positional assessment indicating likely R Posterior Canalithiasis. Trialed R Epley Maneuver today with Good results and eventual resolution of symptoms by final repetition. Educated the patient on the anatomy of the inner ear, impact of Meclizine use on PT, potential for residual dizziness for up to 48 hours following session, and to seek higher level of care if symptoms worsen or change and She was in good verbal understanding. She will benefit from skilled outpatient PT in order to reduce dizziness symptoms and return to PLOF.    Patient verbalized understanding of POC.    Please contact me if you have any questions or recommendations. Thank you for the referral and the opportunity to share in Rosanne Stallings's care.      Cut off score   All date  taken from [facility] Neuro Section or Rehab Measures    DGI:  MDC for Vestibular Disorders: 4 points  MDC for Geriatrics/Community Dwelling Older Adults: 3 Points  Falls risk cut off: <19/24    FGA:  MCID: 4 points  Geriatrics/Community Dwelling Older Adults: </= 22/30 fall risk  Geriatrics/Community Dwelling Older Adults: </= 20/30 unexplained falls in the next 6 months  Parkinsons: </= 18/30 fall risk    mCTSIB (normed on ages 20-60, lower number is less sway or better static balance)  Eyes open firm surface (norm 0.21-0.48)  Eyes closed firm surface (norm 0.48-0.99)  Eyes open foam surface (norm 0.38-0.71)  Eyes closed foam surface (norm 0.70-2.22)    DHI:  0-39: low perception of handicap  40-69: moderate perception of handicap  : severe perception of handicap  > 60: increased risk for falls        Impairments: activity intolerance, impaired balance, lacks appropriate, HEP, safety issue  Understanding of Dx/Px/POC: Good  Prognosis: Good      Goals    BPPV Goals (4 weeks):  - Patient will report complete resolution of symptoms in order to promote return to PLOF  - Patient will complete FGA in order to promote return to safe performance of ADLs  - Patient will demonstrate (-) David-Hallpike test B/L  - Patient will demonstrate (-) Roll Test B/L        Plan  Plan details: Re-assess positionals  Patient would benefit from: PT Eval  Planned therapy interventions: balance, HEP, manual therapy, neuromuscular re-education, patient education  Frequency: 1-3x per week  Duration in weeks: 4  Plan of Care beginning date: 1-  Plan of Care expiration date: 4 weeks - 2-8-2024  Treatment plan discussed with: Patient        Subjective Evaluation    History of Present Illness  - Mechanism of injury: Patient is a 79 y/o female who arrives to PT with complaints or spinning dizziness that began this past Sunday when she was turning to go from her sink to the dining room. She noted that she laid on the table until it resolved  "and called to get an appointment with her PCP the next day. Patient stated initially she felt \"like I was going to start spinning but it never did and I just felt off, until last night when I went to lay down. That's when I started to spin.\" Patient has previously had vertigo years ago and was prescribed meclizine this time. She was advised to take it PRN and she took one this morning.    Patient had COVID around  and recently had a runny nose in which her physician placed her on a dose of prednisone the first week of 2024 as he felt it may still have been from COVID and she has felt much better since.    (-) head injuries  (-) falls      Dizziness Subjective  - How long does dizziness last: The meclizine helps, otherwise it would be a while  - How would you describe the dizziness: \"Spinning and off\"  - Rolling in bed: No  - Supine to/from sit: Yes  - Recent hearing loss: No  - Tinnitus: Yes - she's always had ringing  - Aural fullness/ear pain: Yes - ear fullness sometimes  - Vision changes: No  - History of recent viral infections: Yes - see HPI  - History of migraines: No    Red Flag Screen  - Numbness: No  - Tingling: No  - Weakness: No  - Unilateral hearing loss: No  - Slurred speech: No  - Progressive hearing loss: No  - Tremors: No  - Poor coordination: No  - LoC: No  - Rigidity: No  - Visual field loss: No  - Memory loss: No  - Vertical nystagmus: No    Pain  Current pain ratin/10  At best pain ratin/10  At worst pain ratin/10  Location: N/A  Aggravating factors: N/A    Social Support  Steps to enter house: Yes, 0 HR  Stairs in house: Just to the basement   Lives in: Ranch style home  Lives with: Alone    Employment status: Retired   Hand dominance: R    Treatments  Previous treatment: PT for vertigo years ago  Current treatment: PT  Diagnostic Testing: None      Objective     BPPV Objective  Integrity Testing  - mVBI: Normal B/L  - Sharp Christa: (-)  - Alar " Ligament Stability Test: (-)    Positional Testing (Patient took Meclizine this morning around 0700)  - R Clark-Hallpike: 15 sec spinning dizziness, no nystagmus, moderate spinning with return to sit  - L David-Hallpike: No spinning/nystagmus, brief spinning with return to sit    R Epley Maneuver  - 1: 15 sec spinning dizziness, brief spin with head roll, 15 sec spinning dizziness with log roll, brief spinning with return to sit (no nystagmus)  - 2: No spinning dizziness until return to sit (brief R upward torsional nystagmus with initial position)  - 3: 20 seconds spinning dizziness, no spin with head roll/log roll, brief spinning with return to sit      Outcome Measures Initial Eval  1-        mCTSIB  - FTEO (firm)  - FTEC (firm)  - FTEO (foam)  - FTEC (foam)   Defer        DGI Defer        FGA Defer        10 meter Defer        DHI /100                                                          Precautions: Standard  Past Medical History:   Diagnosis Date    Arthropathy 10/15/2010    Disease of thyroid gland     Hyperlipidemia     Hypertension     Onychomycosis 03/04/2005

## 2024-01-12 ENCOUNTER — OFFICE VISIT (OUTPATIENT)
Facility: CLINIC | Age: 79
End: 2024-01-12
Payer: COMMERCIAL

## 2024-01-12 DIAGNOSIS — R42 VERTIGO: Primary | ICD-10-CM

## 2024-01-12 PROCEDURE — 97112 NEUROMUSCULAR REEDUCATION: CPT | Performed by: PHYSICAL THERAPIST

## 2024-01-12 NOTE — PROGRESS NOTES
Daily Note   POC expires Auth Status Total   Visits  Start date  Expiration date PT/OT + Visit Limit? Co-Insurance   24 APPROVED 24 BOMN No and $30 Co-pay                                           Visit/Unit Tracking  AUTH Status: APPROVED Date -             Visits  Authed: 12 Used 1 1              Remaining  11 10                 Today's date: 2024  Patient name: Rosanne Stallings  : 1945  MRN: [id number]  Referring provider: Jackie Barragan CRNP  Dx:   Encounter Diagnosis     ICD-10-CM    1. Vertigo  R42                    Subjective: Patient reports she was feeling a little dizzy last night so she took a Meclizine pill around 5:00 pm, however has not had any dizziness yet today. Overall she stated she is feeling better.      Objective: See treatment diary below    Positional Testing (Patient took Meclizine around 1700 last night)  - L David-Hallpike: 30 sec spinning/no nystagmus, brief spinning with return to sit  - R [place]-Hallpike: No spinning dizziness with positional testing or return to sit      L Epley Maneuver  - 1: 40 sec spinning dizziness/no nystagmus, brief spin on head roll and log roll, no spinning with return to sit  - 2: 26 sec spinning dizziness/no nystagmus, no spin on head roll, brief spin on log roll, no spinning with return to sit  - 3: 43 sec spinning dizziness/no nystagmus, no spin on head/log roll/return to sit  - 4: 13 sec spinning dizziness/no nystagmus, no spin on head/log roll/return to sit  - 5: 20 sec spinning dizziness/no nystagmus, no spin on head/log roll/return to sit        Assessment: Patient able to tolerate treatment session well today with continued positional assessments and remaining spinning dizziness. Results of assessments and symptoms indicate resolved R posterior canalithiasis and likely remaining L posterior canalithiasis. Patient with great difficulty discerning dizziness in initial position. Overall improved results with each  successive repetition. Plan to re-assess next session. She will continue to benefit from skilled outpatient PT in order to maximize her function and reduce her symptoms.      Plan: Continue per plan of care. Re-assess positionals (If not resolved, trial Semont vs Semont Plus)       Outcome Measures Initial Eval  1-        mCTSIB  - FTEO (firm)  - FTEC (firm)  - FTEO (foam)  - FTEC (foam)   Defer        DGI Defer        FGA Defer        10 meter Defer        DHI /100

## 2024-01-15 ENCOUNTER — OFFICE VISIT (OUTPATIENT)
Facility: CLINIC | Age: 79
End: 2024-01-15
Payer: COMMERCIAL

## 2024-01-15 DIAGNOSIS — R42 VERTIGO: Primary | ICD-10-CM

## 2024-01-15 PROCEDURE — 97112 NEUROMUSCULAR REEDUCATION: CPT | Performed by: PHYSICAL THERAPIST

## 2024-01-15 NOTE — PROGRESS NOTES
"Daily Note   POC expires Auth Status Total   Visits  Start date  Expiration date PT/OT + Visit Limit? Co-Insurance   24 APPROVED 24 BOMN No and $30 Co-pay                                           Visit/Unit Tracking  AUTH Status: APPROVED Date  1-15            Visits  Authed: 12 Used 1 1 1             Remaining  11 10 9                Today's date: 1/15/2024  Patient name: Rosanne Stallings  : 1945  MRN: [id number]  Referring provider: Jackie Barragan CRNP  Dx:   Encounter Diagnosis     ICD-10-CM    1. Vertigo  R42                      Subjective: Patient reports she was feeling better on Saturday, but a little worse yesterday described as \"woozy\" or \"off kilter\" in relation to head turns. No reports of spinning.       Objective: See treatment diary below    Positional Testing (Patient took Meclizine around 1700 last night)  - L Overton-Hallpike: no nystagmus observed, + for subjective symptoms lasting about 15 seconds  - R David-Hallpike: negative       L Epley Maneuver performed 3x with no reports of dizziness on any attempt        Assessment: Patient still positive today for L posterior canal subjective BPPV (no nystagmus observed). Tolerated epleys well with no symptoms. Demonstrated Semont at start of session but pt requested to continue with Epleys. Pt to return Thurs for recheck and possible VOMS if any dizziness symptoms still present. She will continue to benefit from skilled outpatient PT in order to maximize her function and reduce her symptoms.      Plan: Continue per plan of care. Re-assess positionals (If not resolved, trial Semont vs Semont Plus)       Outcome Measures Initial Eval  2024        mCTSIB  - FTEO (firm)  - FTEC (firm)  - FTEO (foam)  - FTEC (foam)   Defer        DGI Defer        FGA Defer        10 meter Defer        DHI /100                                                          "

## 2024-01-18 ENCOUNTER — OFFICE VISIT (OUTPATIENT)
Facility: CLINIC | Age: 79
End: 2024-01-18
Payer: COMMERCIAL

## 2024-01-18 DIAGNOSIS — R42 VERTIGO: Primary | ICD-10-CM

## 2024-01-18 PROCEDURE — 97112 NEUROMUSCULAR REEDUCATION: CPT | Performed by: PHYSICAL THERAPIST

## 2024-01-22 ENCOUNTER — OFFICE VISIT (OUTPATIENT)
Facility: CLINIC | Age: 79
End: 2024-01-22
Payer: COMMERCIAL

## 2024-01-22 DIAGNOSIS — R42 VERTIGO: Primary | ICD-10-CM

## 2024-01-22 PROCEDURE — 97112 NEUROMUSCULAR REEDUCATION: CPT | Performed by: PHYSICAL THERAPIST

## 2024-01-22 NOTE — PROGRESS NOTES
Daily Note   POC expires Auth Status Total   Visits  Start date  Expiration date PT/OT + Visit Limit? Co-Insurance   24 APPROVED 24 BOMN No and $30 Co-pay                                           Visit/Unit Tracking  AUTH Status: APPROVED Date 1-11 1-12 1-15 1-18 1-22          Visits  Authed: 12 Used 1 1 1 1 1           Remaining  11 10 9 8 7              Today's date: 2024  Patient name: Rosanne Stallings  : 1945  MRN: [id number]  Referring provider: Jackie Barragan CRNP  Dx:   Encounter Diagnosis     ICD-10-CM    1. Vertigo  R42                          Subjective: Patient reports Saturday she felt some symptoms when she was laying down, felt like her eyes were moving. No other symptoms since then.       Objective: See treatment diary below    Positional Testing using Vestibular First goggles    - L Siren-Hallpike: 2 seconds of upbeat L torsional nystagmus, mild reports of spinning dizziness     - R Siren-Hallpike: negative    - R roll test: negative    - L roll test: negative     L Semont performed 3x     Assessment: Used Vestibular First goggles today to more accurately assess for BPPV. Goggles revealed short burst of L upbeat torsional nystagmus with L Siren-Hallpike. All other positions negative and pt asymptomatic. Continued to treat for L posterior canal BPPV today with good tolerance. She will continue to benefit from skilled outpatient PT in order to maximize her function and reduce her symptoms.      Plan: Continue per plan of care.  Vestibular First goggles.        Outcome Measures Initial Eval  2024        mCTSIB  - FTEO (firm)  - FTEC (firm)  - FTEO (foam)  - FTEC (foam)   Defer        DGI Defer        FGA Defer        10 meter Defer        DHI /100

## 2024-01-25 ENCOUNTER — OFFICE VISIT (OUTPATIENT)
Facility: CLINIC | Age: 79
End: 2024-01-25
Payer: COMMERCIAL

## 2024-01-25 DIAGNOSIS — R42 VERTIGO: Primary | ICD-10-CM

## 2024-01-25 PROCEDURE — 97112 NEUROMUSCULAR REEDUCATION: CPT | Performed by: PHYSICAL THERAPIST

## 2024-01-25 NOTE — PROGRESS NOTES
"Daily Note   POC expires Auth Status Total   Visits  Start date  Expiration date PT/OT + Visit Limit? Co-Insurance   24 APPROVED 24 BOMN No and $30 Co-pay                                           Visit/Unit Tracking  AUTH Status: APPROVED Date 1-11 1-12 1-15 1-18 1-22 1-25         Visits  Authed: 12 Used 1 1 1 1 1 1          Remaining  11 10 9 8 7 6             Today's date: 2024  Patient name: Rosanne Stallings  : 1945  MRN: [id number]  Referring provider: Jackie Barragan CRNP  Dx:   Encounter Diagnosis     ICD-10-CM    1. Vertigo  R42                      Subjective: Patient reports she felt good since last session, up until this morning. She woke up this morning, sat up, and waited awhile because she felt a little \"off\"       Objective: See treatment diary below    Positional Testing using Vestibular First goggles (performed twice and recorded to watch back)    - L [place]-Hallpike: horizontal right beating nystagmus  - R David-Hallpike: negative  - R roll test: negative  - L roll test: negative     Pt given Barfield Daroff and VORx1 for HEP    Assessment: Used Vestibular First goggles today to more accurately assess for BPPV. No torsional nystagmus observed today, just right beating nystagmus in L [place]-Hallpike position. Could be attributed to hypofunction. She scored low fall risk per FGA. She is technically negative for BPPV today so gave her Barfield-Daroff and VORx1 exercises to try at home. Will continue to treat as hypofunction at this point. She will continue to benefit from skilled outpatient PT in order to maximize her function and reduce her symptoms.      Plan: Continue per plan of care.         Outcome Measures Initial Eval  2024       mCTSIB  - FTEO (firm)  - FTEC (firm)  - FTEO (foam)  - FTEC (foam)   Defer        DGI Defer        FGA Defer 25/30       10 meter Defer        DHI /100                                                          "

## 2024-01-29 ENCOUNTER — OFFICE VISIT (OUTPATIENT)
Facility: CLINIC | Age: 79
End: 2024-01-29
Payer: COMMERCIAL

## 2024-01-29 DIAGNOSIS — I10 BENIGN ESSENTIAL HYPERTENSION: ICD-10-CM

## 2024-01-29 DIAGNOSIS — R42 VERTIGO: Primary | ICD-10-CM

## 2024-01-29 PROCEDURE — 97112 NEUROMUSCULAR REEDUCATION: CPT | Performed by: PHYSICAL THERAPIST

## 2024-01-29 RX ORDER — ATENOLOL 25 MG/1
25 TABLET ORAL DAILY
Qty: 90 TABLET | Refills: 1 | Status: SHIPPED | OUTPATIENT
Start: 2024-01-29

## 2024-01-29 NOTE — PROGRESS NOTES
"Daily Note   POC expires Auth Status Total   Visits  Start date  Expiration date PT/OT + Visit Limit? Co-Insurance   24 APPROVED 24 BOMN No and $30 Co-pay                                           Visit/Unit Tracking  AUTH Status: APPROVED Date 1-11 1-12 1-15 1-18 1-22 1-25 1-29        Visits  Authed: 12 Used 1 1 1 1 1 1 1         Remaining  11 10 9 8 7 6 5            Today's date: 2024  Patient name: Rosanne Stallings  : 1945  MRN: [id number]  Referring provider: Jackie Barragan CRNP  Dx:   Encounter Diagnosis     ICD-10-CM    1. Vertigo  R42                        Subjective: Patient reports she felt fine Thursday, felt good on Friday/Saturday/ and was able to perform HEP. This morning around 10 am she performed Barfield Daroff exercises and felt fine during the exercise but very dizzy afterwards; it seems to subside once she gets up and walks around but she is still a little \"off\" right now.       Objective: See treatment diary below    NMR:  - FTEC firm 30 sec, 5 reps   - Tandem walking 10 ft x 10 laps  - FTEO foam 30 sec x 3 reps  - FTEC foam 10 sec, 20 sec, 25 sec, 30 reps   - Sidestepping foam beams 10 ft x 10 laps   - FTEO foam with head turns 20x horizontal, 20x vertical   - VOR cx x 1 minute horizontal, x 1 min vertical     HEP: Lico Thomson, VORx1, FTEC firm performed in corner of room and chair in front   Also provided handout for deep breathing and grounding techniques     Assessment: Pt tolerated treatment session well today with focus on balance, due to her description of symptoms feeling like she is \"off\" and more dysequilibrium rather than dizzy. Overall she is tolerating HEP well with the exception of this morning. Added FTEC to HEP and instructed to perform in corner of room and chair in front of her for safety purposes. She will continue to benefit from skilled outpatient PT in order to maximize her function and reduce her symptoms.      Plan: Continue per " plan of care.  Balance and VOR.        Outcome Measures Initial Eval  1- 1-25-24       mCTSIB  - FTEO (firm)  - FTEC (firm)  - FTEO (foam)  - FTEC (foam)   Defer        DGI Defer        FGA Defer 25/30       10 meter Defer        DHI /100

## 2024-02-01 ENCOUNTER — OFFICE VISIT (OUTPATIENT)
Facility: CLINIC | Age: 79
End: 2024-02-01
Payer: COMMERCIAL

## 2024-02-01 DIAGNOSIS — R42 VERTIGO: Primary | ICD-10-CM

## 2024-02-01 PROCEDURE — 97112 NEUROMUSCULAR REEDUCATION: CPT | Performed by: PHYSICAL THERAPIST

## 2024-02-01 NOTE — PROGRESS NOTES
"Daily Note   POC expires Auth Status Total   Visits  Start date  Expiration date PT/OT + Visit Limit? Co-Insurance   24 APPROVED 24 BOMN No and $30 Co-pay                                           Visit/Unit Tracking  AUTH Status: APPROVED Date 1-11 1-12 1-15 1-18 1-22 1-25 1-29        Visits  Authed: 12 Used 1 1 1 1 1 1 1         Remaining  11 10 9 8 7 6 5            Today's date: 2024  Patient name: Rosanne Stallings  : 1945  MRN: [id number]  Referring provider: Jackie Barragan CRNP  Dx:   Encounter Diagnosis     ICD-10-CM    1. Vertigo  R42                          Subjective: Patient reports she had a bad day on Tuesday and has been feeling progressively better each day and ultimately feels okay today.      Objective: See treatment diary below    NMR:  - FTEO foam with head turns 20x horizontal, 20x vertical  - FTEC foam 3 reps, 30 sec  - Modified tandem stance w/ EC on firm: 2 reps B/L, 30 sec  - Tandem walking 10 ft x 10 laps  - Sidestepping foam beams 10 ft x 10 laps   - VOR cx x 1 minute horizontal, x 1 min vertical, plain background, mild unsteadiness  - VORx1 (1 min, 100 bpm, plain background): H/V, mild unsteadiness  - Ambulation w/ alternate hand to knee taps; 5 laps, 10 ft down/back      HEP: Lico Thomson, VORx1, FTEC firm performed in corner of room and chair in front   Also provided handout for deep breathing and grounding techniques     Assessment: Pt tolerated treatment session well today with focus on balance, due to her description of symptoms feeling like she is \"off\" and more dysequilibrium rather than dizzy. Able to progress static balance activities with (+) postural sway and ability to complete duration of activity. Mild increase in dizziness symptoms with vestibular adaptation activities. Educated the patient to continue to HEP and advise primary PT if she experiences any adverse effects. She will continue to benefit from skilled outpatient PT in order to " maximize her function and reduce her symptoms.      Plan: Continue per plan of care.  Balance and VOR.        Outcome Measures Initial Eval  1- 1-25-24       mCTSIB  - FTEO (firm)  - FTEC (firm)  - FTEO (foam)  - FTEC (foam)   Defer        DGI Defer        FGA Defer 25/30       10 meter Defer        DHI /100

## 2024-02-05 ENCOUNTER — OFFICE VISIT (OUTPATIENT)
Facility: CLINIC | Age: 79
End: 2024-02-05
Payer: COMMERCIAL

## 2024-02-05 DIAGNOSIS — R42 VERTIGO: Primary | ICD-10-CM

## 2024-02-05 PROCEDURE — 97112 NEUROMUSCULAR REEDUCATION: CPT | Performed by: PHYSICAL THERAPIST

## 2024-02-05 NOTE — PROGRESS NOTES
"Daily Note   POC expires Auth Status Total   Visits  Start date  Expiration date PT/OT + Visit Limit? Co-Insurance   24 APPROVED 24 BOMN No and $30 Co-pay                                           Visit/Unit Tracking  AUTH Status: APPROVED Date --15 -18 - 2-1 2-5      Visits  Authed: 12 Used 1 1 1 1 1 1 1 1 1       Remaining  11 10 9 8 7 6 5 4 3          Today's date: 2024  Patient name: Rosanne Stallings  : 1945  MRN: [id number]  Referring provider: Jackie Barragan CRNP  Dx:   Encounter Diagnosis     ICD-10-CM    1. Vertigo  R42                            Subjective: Patient reports \"it's been good\", able to put her head back at night with no problems. She woke up feeling fine this morning, but the more she moved around the more \"off\" she felt. She feels a little dizzy when she looks down.       Objective: See treatment diary below    NMR:  - FTEO foam with head turns 20x horizontal, 20x vertical  - FTEC foam 3 reps, 30 sec  - Tandem walking 10 ft x 10 laps  - Sidestepping foam beam 10 ft x 10 laps   - VOR cx x 1 minute horizontal, x 1 min vertical, plain background, mild unsteadiness  - VORx1 (90 sec, 104-108 bpm, plain background): H/V, mild unsteadiness  - Blaze pod taps with feet and hands while sidestepping x 2 min  - Blaze pod taps on 12\" step using hands x 2 min   - Ambulation w/ alternate hand to knee taps; 5 laps, 10 ft down/back      HEP: Lico Thomson, VORx1, FTEC firm performed in corner of room and chair in front   Also provided handout for deep breathing and grounding techniques     Assessment: Pt tolerated treatment session well today with focus on balance. Incorporated more habituation exercises bending down, due to subjective reports of dizziness with that. Unable to reproduce dizziness today, just mild unsteadiness or sway with VOR. She did excellent with blaze pod taps, no LOB or symptoms reproduced.She will continue to benefit from skilled " outpatient PT in order to maximize her function and reduce her symptoms.      Plan: Continue per plan of care.  Re-eval next visit.        Outcome Measures Initial Eval  1- 1-25-24       mCTSIB  - FTEO (firm)  - FTEC (firm)  - FTEO (foam)  - FTEC (foam)   Defer        DGI Defer        FGA Defer 25/30       10 meter Defer        DHI /100

## 2024-02-08 ENCOUNTER — OFFICE VISIT (OUTPATIENT)
Dept: FAMILY MEDICINE CLINIC | Facility: CLINIC | Age: 79
End: 2024-02-08
Payer: COMMERCIAL

## 2024-02-08 ENCOUNTER — EVALUATION (OUTPATIENT)
Facility: CLINIC | Age: 79
End: 2024-02-08
Payer: COMMERCIAL

## 2024-02-08 VITALS
BODY MASS INDEX: 29.15 KG/M2 | DIASTOLIC BLOOD PRESSURE: 70 MMHG | HEART RATE: 68 BPM | TEMPERATURE: 97.7 F | RESPIRATION RATE: 18 BRPM | SYSTOLIC BLOOD PRESSURE: 130 MMHG | WEIGHT: 159.4 LBS

## 2024-02-08 DIAGNOSIS — I10 BENIGN ESSENTIAL HYPERTENSION: ICD-10-CM

## 2024-02-08 DIAGNOSIS — R42 VERTIGO: Primary | ICD-10-CM

## 2024-02-08 DIAGNOSIS — R73.03 PREDIABETES: ICD-10-CM

## 2024-02-08 DIAGNOSIS — R42 DIZZINESS: ICD-10-CM

## 2024-02-08 DIAGNOSIS — E78.5 HYPERLIPIDEMIA LDL GOAL <130: ICD-10-CM

## 2024-02-08 PROCEDURE — 97530 THERAPEUTIC ACTIVITIES: CPT | Performed by: PHYSICAL THERAPIST

## 2024-02-08 PROCEDURE — 99214 OFFICE O/P EST MOD 30 MIN: CPT | Performed by: FAMILY MEDICINE

## 2024-02-08 NOTE — PATIENT INSTRUCTIONS
We can do a thorough MRI of your brain but if it is ok, we could do an evaluation of the neck/brain circulation after (known as a CAT scan Angiogram).

## 2024-02-08 NOTE — PROGRESS NOTES
Assessment/Plan:    No problem-specific Assessment & Plan notes found for this encounter.    Ongoing vertigo, partial response to vestibular therapy  Will investigate with MRI brain  Consider CTA head/neck in future if ongoing  Pt agreeable    Prediabetes, watch carbs    Htn stable  HLD stable     Diagnoses and all orders for this visit:    Vertigo  -     MRI brain IAC wo and w contrast; Future    Benign essential hypertension    Hyperlipidemia LDL goal <130    BMI 29.0-29.9,adult    Prediabetes      No follow-ups on file.    Subjective:      Patient ID: Rosanne Stallings is a 78 y.o. female.    Chief Complaint   Patient presents with    ear check      Has been doing therapy for balance. Wants to make sure nothing else is going on in her ears     HK CMA        HPI  Going to PT  Still getting exacerbations   Episodic even w/o head movt  Was worse before PT, has been some benefit  Uncertain triggers though  Not food related  Been 1 month of PT     The following portions of the patient's history were reviewed and updated as appropriate: allergies, current medications, past family history, past medical history, past social history, past surgical history and problem list.    Review of Systems   Constitutional:  Negative for fever.   Neurological:  Positive for dizziness. Negative for syncope and weakness.         Current Outpatient Medications   Medication Sig Dispense Refill    amLODIPine (NORVASC) 5 mg tablet Take 1 tablet (5 mg total) by mouth daily 90 tablet 1    atenolol (TENORMIN) 25 mg tablet Take 1 tablet (25 mg total) by mouth daily 90 tablet 1    atorvastatin (LIPITOR) 20 mg tablet Take 1 tablet (20 mg total) by mouth daily 90 tablet 1    hydrochlorothiazide (HYDRODIURIL) 12.5 mg tablet Take 1 tablet (12.5 mg total) by mouth daily 90 tablet 1    levothyroxine 88 mcg tablet take 1 tablet by mouth once daily 90 tablet 1    losartan (COZAAR) 100 MG tablet Take 1 tablet (100 mg total) by mouth daily 90 tablet 1     meclizine (ANTIVERT) 12.5 MG tablet Take 1 tablet (12.5 mg total) by mouth every 12 (twelve) hours as needed for dizziness 30 tablet 0     No current facility-administered medications for this visit.       Objective:    /70   Pulse 68   Temp 97.7 °F (36.5 °C)   Resp 18   Wt 72.3 kg (159 lb 6.4 oz)   BMI 29.15 kg/m²        Physical Exam  Vitals and nursing note reviewed.   Constitutional:       General: She is not in acute distress.     Appearance: She is well-developed. She is not ill-appearing.   HENT:      Head: Normocephalic.      Right Ear: Tympanic membrane, ear canal and external ear normal.      Left Ear: Tympanic membrane, ear canal and external ear normal.      Mouth/Throat:      Pharynx: No oropharyngeal exudate.   Eyes:      General: No scleral icterus.     Conjunctiva/sclera: Conjunctivae normal.   Neck:      Vascular: No carotid bruit.   Cardiovascular:      Rate and Rhythm: Normal rate and regular rhythm.      Heart sounds: No murmur heard.  Pulmonary:      Effort: Pulmonary effort is normal. No respiratory distress.      Breath sounds: No wheezing.   Abdominal:      Palpations: Abdomen is soft.   Musculoskeletal:         General: No deformity.      Cervical back: Neck supple.   Skin:     General: Skin is warm and dry.      Coloration: Skin is not jaundiced or pale.   Neurological:      General: No focal deficit present.      Mental Status: She is alert.      Cranial Nerves: No cranial nerve deficit.      Coordination: Coordination normal.      Gait: Gait normal.   Psychiatric:         Mood and Affect: Mood normal.         Behavior: Behavior normal.         Thought Content: Thought content normal.                Lit Mcnally DO

## 2024-02-08 NOTE — LETTER
2024      No Recipients    Patient: Rosanne Stallings   YOB: 1945   Date of Visit: 2024     Encounter Diagnosis     ICD-10-CM    1. Vertigo  R42           Dear Dr. Eason Recipients:    Thank you for your recent referral of Rosanne Stallings. Please review the attached evaluation summary from Rosanne's recent visit.     Please verify that you agree with the plan of care by signing the attached order.     If you have any questions or concerns, please do not hesitate to call.     I sincerely appreciate the opportunity to share in the care of one of your patients and hope to have another opportunity to work with you in the near future.       Sincerely,    Diane Pathak, PT      Referring Provider:      I certify that I have read the below Plan of Care and certify the need for these services furnished under this plan of treatment while under my care.                      No Recipients                                                                              PT Evaluation          POC expires Auth Status Total   Visits  Start date  Expiration date PT/OT + Visit Limit? Co-Insurance   24 SUBMITTED  ON 24 - CW PEND 24  bOMN No and $30 Co-pay                                           Visit/Unit Tracking  AUTH Status: SUBMITTED Date               Visits  Authed: PEND Used 1               Remaining                       Today's date: 2024  Patient name: Rosanne Stallings  : 1945  MRN: [id number]  Referring provider: Jackie Barragan CRNP  Dx:   Encounter Diagnosis     ICD-10-CM    1. Vertigo  R42               Assessment  Assessment details: Patient is a 78 y.o. Female who presents to skilled outpatient PT with spinning dizziness impacting her ability to perform ADLs and safely. Cervical spine integrity intact per normal and negative results of mVBI, Sharp Christa, and Alar Stability Tests respectively. Patient displayed R upward torisonal nystagmus with positional  assessment indicating likely R Posterior Canalithiasis. Trialed R Epley Maneuver today with Good results and eventual resolution of symptoms by final repetition. Educated the patient on the anatomy of the inner ear, impact of Meclizine use on PT, potential for residual dizziness for up to 48 hours following session, and to seek higher level of care if symptoms worsen or change and She was in good verbal understanding. She will benefit from skilled outpatient PT in order to reduce dizziness symptoms and return to PLOF.    Patient verbalized understanding of POC.    Please contact me if you have any questions or recommendations. Thank you for the referral and the opportunity to share in Rosanne Stallings's care.      Cut off score   All date taken from [facility] Neuro Section or Rehab Measures    DGI:  MDC for Vestibular Disorders: 4 points  MDC for Geriatrics/Community Dwelling Older Adults: 3 Points  Falls risk cut off: <19/24    FGA:  MCID: 4 points  Geriatrics/Community Dwelling Older Adults: </= 22/30 fall risk  Geriatrics/Community Dwelling Older Adults: </= 20/30 unexplained falls in the next 6 months  Parkinsons: </= 18/30 fall risk    mCTSIB (normed on ages 20-60, lower number is less sway or better static balance)  Eyes open firm surface (norm 0.21-0.48)  Eyes closed firm surface (norm 0.48-0.99)  Eyes open foam surface (norm 0.38-0.71)  Eyes closed foam surface (norm 0.70-2.22)    DHI:  0-39: low perception of handicap  40-69: moderate perception of handicap  : severe perception of handicap  > 60: increased risk for falls        Impairments: activity intolerance, impaired balance, lacks appropriate, HEP, safety issue  Understanding of Dx/Px/POC: Good  Prognosis: Good      Goals  Vestibular Short Term Goals (4 weeks):  - Patient will display improved cervical spine STM by 50% to encourage improved AROM during functional tasks  - Patient will be independent with simple HEP  - Patient will tolerate 60 seconds  of oculomotor exercises with minimal increase in symptoms  - Patient will demonstrate 10% decrease in symptom severity scoring with independent use of modalities  - Patient will demonstrate improved soft tissue density t/o cervical region with independent self-release  - Patient will be able to tolerate 30 seconds with eyes closed on foam surface without any loss of balance demonstrating improvement in vestibular system  - Patient will improve with DGI by 3 points per MDC to promote improved safety with dynamic tasks  - Patient will improve FGA score by 4 points per MDC to promote improved safety with dynamic tasks    Vestibular Long Term Goals (12 weeks):  - Patient will display decreased forward head and rounded shoulders to promote improved resting posture and cervical mobility  - Patient will be independent with complex HEP  - Patient will tolerate >=2 minutes of oculomotor exercises to facilitate return to reading and computer work  - Patient will report >= 50% improvement on symptom severity scoring  - Patient will demonstrate ability to perform HT in gait without veering  - Patient will be able to perform 15 minutes of aerobic activity at HR 70% max to facilitate return to sport/normal functional tasks  - Patient will demonstrate normalized soft tissue t/o  - Patient will score low risk for falls with DGI test with score of 19/24 or higher per current research data  - Patient will score low risk for falls with FGA test with score of 23/30 or higher per current research data  - Patient will report baseline dizziness of 1/10 or less   - Patient will report 2/10 dizziness or less with visual stimulating surround with duration of 2 minutes   - Patient will report subjective improvement to 90% or higher to promote return to PLOF  - Patient will complete work related tasks without exacerbation of symptoms in order to maximize function and promote return to work  - Patient will complete RTP protocol in order to  "promote return to sports related tasks      Plan  Plan details: Re-assess positionals  Patient would benefit from: PT Eval  Planned therapy interventions: balance, HEP, manual therapy, neuromuscular re-education, patient education  Frequency: 1-3x per week  Duration in weeks: 12  Plan of Care beginning date: 2-8-2024  Plan of Care expiration date: 12 weeks - 5-2-2024  Treatment plan discussed with: Patient        Subjective Evaluation    History of Present Illness  - Mechanism of injury: Patient is a 79 y/o female who arrives to PT with complaints or spinning dizziness that began this past Sunday when she was turning to go from her sink to the dining room. She noted that she laid on the table until it resolved and called to get an appointment with her PCP the next day. Patient stated initially she felt \"like I was going to start spinning but it never did and I just felt off, until last night when I went to lay down. That's when I started to spin.\" Patient has previously had vertigo years ago and was prescribed meclizine this time. She was advised to take it PRN and she took one this morning.    Patient had COVID around Thanksgiving and recently had a runny nose in which her physician placed her on a dose of prednisone the first week of January 2024 as he felt it may still have been from COVID and she has felt much better since.    (-) head injuries  (-) falls    Update (2-8-2024)  - Patient reports that she has been feeling better overall, however continues to experience random waves of dizziness. She was at her PCP prior to this visit today who ordered a brain MRI in which the patient stated she still has to schedule. Patient follows up with her eye doctor yearly and the last time she was there was May 2023.      Dizziness Subjective  - How long does dizziness last: The meclizine helps, otherwise it would be a while  - How would you describe the dizziness: \"Spinning and off\"  - Rolling in bed: No  - Supine to/from " "sit: Yes  - Recent hearing loss: No  - Tinnitus: Yes - she's always had ringing  - Aural fullness/ear pain: Yes - ear fullness sometimes  - Vision changes: No  - History of recent viral infections: Yes - see HPI  - History of migraines: No    Red Flag Screen  - Numbness: No  - Tingling: No  - Weakness: No  - Unilateral hearing loss: No  - Slurred speech: No  - Progressive hearing loss: No  - Tremors: No  - Poor coordination: No  - LoC: No  - Rigidity: No  - Visual field loss: No  - Memory loss: No  - Vertical nystagmus: No    Pain  Current pain ratin/10  At best pain ratin/10  At worst pain ratin/10  Location: N/A  Aggravating factors: N/A    Social Support  Steps to enter house: Yes, 0 HR  Stairs in house: Just to the basement   Lives in: Ranch style home  Lives with: Alone    Employment status: Retired   Hand dominance: R    Treatments  Previous treatment: PT for vertigo years ago  Current treatment: PT  Diagnostic Testing: None      Objective     BPPV Objective  Integrity Testing  - mVBI: Normal B/L  - Sharp Christa: (-)  - Alar Ligament Stability Test: (-)      VOMS (Vestibular/Ocular Motor Screen)     Baseline symptoms: mild dizziness \"like I'm on a boat\"     Gaze holding nystagmus: normal     End gaze nystagmus: normal     Smooth pursuit: mild saccadic tracking vertically     Saccades (horizontal): mild dysmetria to L     Saccades (vertical): mild dysmetria down     Convergence (near point): trialed 3 reps (1: normal, 2 and 3: diplopia at 12\")     VOR (horizontal): mild dizziness     VOR Cx (horizontal): mild difficulty maintaining gaze, no increase in dizziness      Outcome Measures Initial Eval  2024 RE  2024       mCTSIB  - FTEO (firm)  - FTEC (firm)  - FTEO (foam)  - FTEC (foam)   Defer   30 sec  30 sec  30 sec (+)  30 sec (+)       DGI Defer 21/24       FGA Defer 22/30       10 meter Defer NT       DHI /100 22/100 (low)       JPET  R: > 4.5 deg (in 3rd yellow on L)  L: < " 4.5 deg (outer green)                                                Precautions: Standard  Past Medical History:   Diagnosis Date   • Arthropathy 10/15/2010   • Disease of thyroid gland    • Hyperlipidemia    • Hypertension    • Onychomycosis 03/04/2005

## 2024-02-08 NOTE — PROGRESS NOTES
PT Re-Evaluation          POC expires Auth Status Total   Visits  Start date  Expiration date PT/OT + Visit Limit? Co-Insurance   24 APPROVED 24 BOMN No and $30 Co-pay   24 SUBMITTED 24 - CW PEND                                      Visit/Unit Tracking  AUTH Status: APPROVED Date -12 1-15 1-18 1-- 1-29 2-1 2-5 2-8     Visits  Authed: 12 Used 1 1 1 1 1 1 1 1 1 1      Remaining  11 10 9 8 7 6 5 4 3 2              Today's date: 2024  Patient name: Rosanne Stallings  : 1945  MRN: [id number]  Referring provider: Jackie Barragan CRNP  Dx:   Encounter Diagnosis     ICD-10-CM    1. Vertigo  R42       2. Dizziness  R42               Assessment  Assessment details: Patient is a 78 y.o. Female who has been reporting to skilled outpatient PT with dizziness and imbalance impacting her ability to perform ADLs and ambulation safely. Patient displayed mild exacerbation of symptoms with oculomotor screen indicating remaining vestibular dysfunction. Discussed calling her eye doctor due to ocular deficits during screen resulting in straining, diplopia, and mild dizziness. Patient with (+) postural sway with the mCTSIB indicating decreased overall somatosensory awareness. Patient is a HIGH risk for falls with dynamic balance tasks per results of FGA. Subjective results indicate low perception of handicap related to her dizziness symptoms. Results of JPET assessment depict cervical joint proprioception deficit. Trialed repetitive testing with JPET and the patient demonstrated improvements with each repetition indicating good motor learning potential. She will continue to benefit from skilled outpatient PT in order to reduce her dizziness symptoms and return to PLOF.    Patient verbalized understanding of POC.    Please contact me if you have any questions or recommendations. Thank you for the referral and the  opportunity to share in Rosanne JAYCE Stallings's care.      Cut off score   All date taken from [facility]TA Neuro Section or Rehab Measures    DGI:  MDC for Vestibular Disorders: 4 points  MDC for Geriatrics/Community Dwelling Older Adults: 3 Points  Falls risk cut off: <19/24    FGA:  MCID: 4 points  Geriatrics/Community Dwelling Older Adults: </= 22/30 fall risk  Geriatrics/Community Dwelling Older Adults: </= 20/30 unexplained falls in the next 6 months  Parkinsons: </= 18/30 fall risk    mCTSIB (normed on ages 20-60, lower number is less sway or better static balance)  Eyes open firm surface (norm 0.21-0.48)  Eyes closed firm surface (norm 0.48-0.99)  Eyes open foam surface (norm 0.38-0.71)  Eyes closed foam surface (norm 0.70-2.22)    DHI:  0-39: low perception of handicap  40-69: moderate perception of handicap  : severe perception of handicap  > 60: increased risk for falls        Impairments: activity intolerance, impaired balance, lacks appropriate, HEP, safety issue  Understanding of Dx/Px/POC: Good  Prognosis: Good      Goals  Vestibular Short Term Goals (4 weeks):  - Patient will display improved cervical spine STM by 50% to encourage improved AROM during functional tasks  - Patient will be independent with simple HEP  - Patient will tolerate 60 seconds of oculomotor exercises with minimal increase in symptoms  - Patient will demonstrate 10% decrease in symptom severity scoring with independent use of modalities  - Patient will demonstrate improved soft tissue density t/o cervical region with independent self-release  - Patient will be able to tolerate 30 seconds with eyes closed on foam surface without any loss of balance demonstrating improvement in vestibular system  - Patient will improve with DGI by 3 points per MDC to promote improved safety with dynamic tasks  - Patient will improve FGA score by 4 points per MDC to promote improved safety with dynamic tasks    Vestibular Long Term Goals (12 weeks):  -  Patient will display decreased forward head and rounded shoulders to promote improved resting posture and cervical mobility  - Patient will be independent with complex HEP  - Patient will tolerate >=2 minutes of oculomotor exercises to facilitate return to reading and computer work  - Patient will report >= 50% improvement on symptom severity scoring  - Patient will demonstrate ability to perform HT in gait without veering  - Patient will be able to perform 15 minutes of aerobic activity at HR 70% max to facilitate return to sport/normal functional tasks  - Patient will demonstrate normalized soft tissue t/o  - Patient will score low risk for falls with DGI test with score of 19/24 or higher per current research data  - Patient will score low risk for falls with FGA test with score of 23/30 or higher per current research data  - Patient will report baseline dizziness of 1/10 or less   - Patient will report 2/10 dizziness or less with visual stimulating surround with duration of 2 minutes   - Patient will report subjective improvement to 90% or higher to promote return to PLOF  - Patient will complete work related tasks without exacerbation of symptoms in order to maximize function and promote return to work  - Patient will complete RTP protocol in order to promote return to sports related tasks      Plan  Plan details: Re-assess positionals  Patient would benefit from: PT Eval  Planned therapy interventions: balance, HEP, manual therapy, neuromuscular re-education, patient education  Frequency: 1-3x per week  Duration in weeks: 12  Plan of Care beginning date: 2-8-2024  Plan of Care expiration date: 12 weeks - 5-2-2024  Treatment plan discussed with: Patient        Subjective Evaluation    History of Present Illness  - Mechanism of injury: Patient is a 77 y/o female who arrives to PT with complaints or spinning dizziness that began this past Sunday when she was turning to go from her sink to the dining room. She  "noted that she laid on the table until it resolved and called to get an appointment with her PCP the next day. Patient stated initially she felt \"like I was going to start spinning but it never did and I just felt off, until last night when I went to lay down. That's when I started to spin.\" Patient has previously had vertigo years ago and was prescribed meclizine this time. She was advised to take it PRN and she took one this morning.    Patient had COVID around  and recently had a runny nose in which her physician placed her on a dose of prednisone the first week of 2024 as he felt it may still have been from COVID and she has felt much better since.    (-) head injuries  (-) falls    Update (2024)  - Patient reports that she has been feeling better overall, however continues to experience random waves of dizziness. She was at her PCP prior to this visit today who ordered a brain MRI in which the patient stated she still has to schedule. Patient follows up with her eye doctor yearly and the last time she was there was May 2023.      Dizziness Subjective  - How long does dizziness last: The meclizine helps, otherwise it would be a while  - How would you describe the dizziness: \"Spinning and off\"  - Rolling in bed: No  - Supine to/from sit: Yes  - Recent hearing loss: No  - Tinnitus: Yes - she's always had ringing  - Aural fullness/ear pain: Yes - ear fullness sometimes  - Vision changes: No  - History of recent viral infections: Yes - see HPI  - History of migraines: No    Red Flag Screen  - Numbness: No  - Tingling: No  - Weakness: No  - Unilateral hearing loss: No  - Slurred speech: No  - Progressive hearing loss: No  - Tremors: No  - Poor coordination: No  - LoC: No  - Rigidity: No  - Visual field loss: No  - Memory loss: No  - Vertical nystagmus: No    Pain  Current pain ratin/10  At best pain ratin/10  At worst pain ratin/10  Location: N/A  Aggravating factors: N/A    Social " "Support  Steps to enter house: Yes, 0 HR  Stairs in house: Just to the basement   Lives in: [place] style home  Lives with: Alone    Employment status: Retired   Hand dominance: R    Treatments  Previous treatment: PT for vertigo years ago  Current treatment: PT  Diagnostic Testing: None      Objective     BPPV Objective  Integrity Testing  - mVBI: Normal B/L  - Sharp Christa: (-)  - Alar Ligament Stability Test: (-)      VOMS (Vestibular/Ocular Motor Screen)     Baseline symptoms: mild dizziness \"like I'm on a boat\"     Gaze holding nystagmus: normal     End gaze nystagmus: normal     Smooth pursuit: mild saccadic tracking vertically     Saccades (horizontal): mild dysmetria to L     Saccades (vertical): mild dysmetria down     Convergence (near point): trialed 3 reps (1: normal, 2 and 3: diplopia at 12\")     VOR (horizontal): mild dizziness     VOR Cx (horizontal): mild difficulty maintaining gaze, no increase in dizziness      Outcome Measures Initial Eval  1- RE  2-8-2024       mCTSIB  - FTEO (firm)  - FTEC (firm)  - FTEO (foam)  - FTEC (foam)   Defer   30 sec  30 sec  30 sec (+)  30 sec (+)       DGI Defer 21/24       FGA Defer 22/30       10 meter Defer NT       DHI /100 22/100 (low)       JPET  R: > 4.5 deg (in 3rd yellow on L)  L: < 4.5 deg (outer green)                                                Precautions: Standard  Past Medical History:   Diagnosis Date    Arthropathy 10/15/2010    Disease of thyroid gland     Hyperlipidemia     Hypertension     Onychomycosis 03/04/2005      "

## 2024-02-12 ENCOUNTER — OFFICE VISIT (OUTPATIENT)
Facility: CLINIC | Age: 79
End: 2024-02-12
Payer: COMMERCIAL

## 2024-02-12 DIAGNOSIS — R42 DIZZINESS: ICD-10-CM

## 2024-02-12 DIAGNOSIS — R42 VERTIGO: Primary | ICD-10-CM

## 2024-02-12 PROCEDURE — 97112 NEUROMUSCULAR REEDUCATION: CPT | Performed by: PHYSICAL THERAPIST

## 2024-02-12 NOTE — PROGRESS NOTES
Daily Note   POC expires Auth Status Total   Visits  Start date  Expiration date PT/OT + Visit Limit? Co-Insurance   24 APPROVED 24 BOMN No and $30 Co-pay                                           Visit/Unit Tracking  AUTH Status: APPROVED Date -12 1-15 1-18 --29 2-1 2-5 2-8 2-12    Visits  Authed: 12 Used 1 1 1 1 1 1 1 1 1 1 1     Remaining  11 10 9 8 7 6 5 4 3 2 1        Today's date: 2024  Patient name: Rosanne Stallings  : 1945  MRN: [id number]  Referring provider: Jackie Barragan CRNP  Dx:   Encounter Diagnosis     ICD-10-CM    1. Vertigo  R42       2. Dizziness  R42                              Subjective: Patient reports feeling a little bit off.       Objective: See treatment diary below    NMR:  - FTEO foam with head turns 30x horizontal, 20x vertical  - FTEC foam 3 reps, 30 sec  - Tandem walking w/ head turns 10 ft x 10 laps  - Sidestepping foam beam w/ head turns 10 ft x 10 laps   - VOR cx (on foam) x 1 minute horizontal, x 1 min vertical, plain background, mild unsteadiness  - VORx1 (120 sec, 108 bpm, plain background, on foam): H/V, mild unsteadiness  - JPET with laser, on foam, using large target 10 reps horizontal, 10 reps vertical  - Blaze pod taps with feet and hands while sidestepping x 2 min        HEP: Lico Thomson, VORx1, FTEC firm performed in corner of room and chair in front   Also provided handout for deep breathing and grounding techniques     Assessment: Pt tolerated treatment session well today with focus on balance. Incorporated JPET exercises with laser due to abnormal findings on re-eval. Progressed all balance exercises today by adding foam or head turns. No LOB or reproduction of dizziness, just instances of postural instability. She will continue to benefit from skilled outpatient PT in order to maximize her function and reduce her symptoms.      Plan: Continue per plan of care.           Outcome Measures Initial Eval  2024  RE  2-8-2024       [facility]  - FTEO (firm)  - FTEC (firm)  - FTEO (foam)  - FTEC (foam)   Defer   30 sec  30 sec  30 sec (+)  30 sec (+)       DGI Defer 21/24       FGA Defer 22/30       10 meter Defer NT       DHI /100 22/100 (low)       JPET  R: > 4.5 deg (in 3rd yellow on L)  L: < 4.5 deg (outer green)

## 2024-02-15 ENCOUNTER — OFFICE VISIT (OUTPATIENT)
Facility: CLINIC | Age: 79
End: 2024-02-15
Payer: COMMERCIAL

## 2024-02-15 DIAGNOSIS — R42 DIZZINESS: ICD-10-CM

## 2024-02-15 DIAGNOSIS — R42 VERTIGO: Primary | ICD-10-CM

## 2024-02-15 PROCEDURE — 97112 NEUROMUSCULAR REEDUCATION: CPT | Performed by: PHYSICAL THERAPIST

## 2024-02-15 NOTE — PROGRESS NOTES
Daily Note   POC expires Auth Status Total   Visits  Start date  Expiration date PT/OT + Visit Limit? Co-Insurance   24 APPROVED 24 BOMN No and $30 Co-pay                                           Visit/Unit Tracking  AUTH Status: APPROVED Date -12 1-15 1-18 1-- 1-29 2-1 2-5 2-8 2-12 2-15   Visits  Authed: 12 Used 1 1 1 1 1 1 1 1 1 1 1 1    Remaining  11 10 9 8 7 6 5 4 3 2 1 0       Today's date: 2/15/2024  Patient name: Rosanne Stallings  : 1945  MRN: [id number]  Referring provider: Jackie Barragan CRNP  Dx:   Encounter Diagnosis     ICD-10-CM    1. Vertigo  R42       2. Dizziness  R42                                Subjective: Patient reports feeling about the same. Has MRI scheduled for .       Objective: See treatment diary below    NMR:  - FTEO foam with head turns 30x horizontal, 30x vertical  - FTEC foam with head turns, 10x horizontal, 20x vertical   - Tandem walking w/ head turns 10 ft x 10 laps  - Sidestepping foam beam w/ head turns 10 ft x 10 laps   - VOR cx (on foam) x 1 minute horizontal, x 1 min vertical, plain background, mild unsteadiness  - VORx1 (120 sec, 108 bpm, plain background, on foam): H/V, mild unsteadiness  - VOR x1 (120 sec, 108 bpm, plain background, walking fwd/bwd); H/V, feels off balance  - JPET with laser, on foam, using large target 10 reps horizontal, 10 reps vertical    - Blaze pod taps with feet and hands while sidestepping x 2 min (NOT TODAY)        HEP: Lico Thomson, VORx1, FTEC firm performed in corner of room and chair in front   Also provided handout for deep breathing and grounding techniques     Assessment: Pt tolerated treatment session well today with focus on balance. Progressed VORx1 to walking fwd/bwd today which she felt more unstable with. She will continue to benefit from skilled outpatient PT in order to maximize her function and reduce her symptoms.      Plan: Continue per plan of care.  MRI scheduled for .           Outcome Measures Initial Eval  1- RE  2-8-2024       mCTSIB  - FTEO (firm)  - FTEC (firm)  - FTEO (foam)  - FTEC (foam)   Defer   30 sec  30 sec  30 sec (+)  30 sec (+)       DGI Defer 21/24       FGA Defer 22/30       10 meter Defer NT       DHI /100 22/100 (low)       JPET  R: > 4.5 deg (in 3rd yellow on L)  L: < 4.5 deg (outer green)

## 2024-02-19 ENCOUNTER — OFFICE VISIT (OUTPATIENT)
Facility: CLINIC | Age: 79
End: 2024-02-19
Payer: COMMERCIAL

## 2024-02-19 DIAGNOSIS — R42 DIZZINESS: ICD-10-CM

## 2024-02-19 DIAGNOSIS — R42 VERTIGO: Primary | ICD-10-CM

## 2024-02-19 PROCEDURE — 97112 NEUROMUSCULAR REEDUCATION: CPT | Performed by: PHYSICAL THERAPIST

## 2024-02-19 NOTE — PROGRESS NOTES
"Daily Note   POC expires Auth Status Total   Visits  Start date  Expiration date PT/OT + Visit Limit? Co-Insurance   24 APPROVED 24 BOMN No and $30 Co-pay   24 [id number] 24                                    Visit/Unit Tracking  AUTH Status: APPROVED Date -12 1-15 1-18 1-22 1-25 1-29 2-1 2-5 2-8 2-12 2-15   Visits  Authed: 12 Used 1 1 1 1 1 1 1 1 1 1 1 1    Remaining  11 10 9 8 7 6 5 4 3 2 1 0     Visit/Unit Tracking  AUTH Status: [id number] Date -              Visits  Authed: 12 Used 1               Remaining  11                  Today's date: 2024  Patient name: Rosanne Stallings  : 1945  MRN: [id number]  Referring provider: Jackie Barragan CRNP  Dx:   Encounter Diagnosis     ICD-10-CM    1. Vertigo  R42       2. Dizziness  R42                      Subjective: Patient reports she felt a little woozy walking in today, but had a few good days prior.       Objective: See treatment diary below    NMR:  - FTEO foam with head turns 30x horizontal, 30x vertical  - FAEC foam with head turns, 10x horizontal, 10x vertical   - Tandem walking w/ head turns 10 ft x 10 laps  - Sidestepping foam beams over 9\" hurdles: 4 laps down/back  - Tandem walking foam beams over 9\" hurdles: 4x down/back   - Sidestepping foam beams with horizontal head turns:   - VOR cx (on foam) x 1 minute horizontal, x 1 min vertical, plain background, mild unsteadiness  - VOR x1 (120 sec, 112 bpm, plain background, walking fwd/bwd); H/V, feels off balance  - Step ups to medium river rocks with head turns x 2 min   - JPET with laser, on foam, using large target 10 reps horizontal, 10 reps vertical    - Blaze pod taps with feet and hands while sidestepping x 2 min (NOT TODAY)        HEP: Lico Thomson, VORx1, FTEC firm performed in corner of room and chair in front   Also provided handout for deep breathing and grounding techniques     Assessment: Pt tolerated treatment session well today with " focus on balance. Progressing well with more foam based activities and dynamic activities. She was most challenged with jaguar negotiation on foam today. She will continue to benefit from skilled outpatient PT in order to maximize her function and reduce her symptoms.      Plan: Continue per plan of care.  MRI scheduled for March 5.          Outcome Measures Initial Eval  1- RE  2-8-2024       mCTSIB  - FTEO (firm)  - FTEC (firm)  - FTEO (foam)  - FTEC (foam)   Defer   30 sec  30 sec  30 sec (+)  30 sec (+)       DGI Defer 21/24       FGA Defer 22/30       10 meter Defer NT       DHI /100 22/100 (low)       JPET  R: > 4.5 deg (in 3rd yellow on L)  L: < 4.5 deg (outer green)

## 2024-02-21 PROBLEM — Z00.00 MEDICARE ANNUAL WELLNESS VISIT, SUBSEQUENT: Status: RESOLVED | Noted: 2018-12-19 | Resolved: 2024-02-21

## 2024-02-21 PROBLEM — Z12.11 COLON CANCER SCREENING: Status: RESOLVED | Noted: 2019-06-25 | Resolved: 2024-02-21

## 2024-02-26 ENCOUNTER — OFFICE VISIT (OUTPATIENT)
Facility: CLINIC | Age: 79
End: 2024-02-26
Payer: COMMERCIAL

## 2024-02-26 DIAGNOSIS — R42 VERTIGO: Primary | ICD-10-CM

## 2024-02-26 DIAGNOSIS — R42 DIZZINESS: ICD-10-CM

## 2024-02-26 PROCEDURE — 97112 NEUROMUSCULAR REEDUCATION: CPT | Performed by: PHYSICAL THERAPIST

## 2024-02-26 NOTE — PROGRESS NOTES
"Daily Note   POC expires Auth Status Total   Visits  Start date  Expiration date PT/OT + Visit Limit? Co-Insurance   24 APPROVED 24 BOMN No and $30 Co-pay   24 [id number] 24                                    Visit/Unit Tracking  AUTH Status: APPROVED Date -12 1-15 1-18 1-22 1-25 1-29 2-1 2-5 2-8 2-12 2-15   Visits  Authed: 12 Used 1 1 1 1 1 1 1 1 1 1 1 1    Remaining  11 10 9 8 7 6 5 4 3 2 1 0     Visit/Unit Tracking  AUTH Status: [id number] Date -19              Visits  Authed: 12 Used 1               Remaining  11                  Today's date: 2024  Patient name: Rosanne Stallings  : 1945  MRN: [id number]  Referring provider: Jackie Barragan CRNP  Dx:   Encounter Diagnosis     ICD-10-CM    1. Vertigo  R42       2. Dizziness  R42                        Subjective: Patient reports overall feeling okay, but occasionally feels \"sway\" usually related to head or eye movements.       Objective: See treatment diary below    NMR:  - FTEO foam with head turns 30x horizontal, 30x vertical, 30x diagonals   - FAEC foam with head turns, 10x horizontal, 10x vertical   - Tandem walking w/ head turns 10 ft x 10 laps  - Tandem walking foam beams: 6x down/back   - Sidestepping foam beams with horizontal head turns: 6x down/back  - VOR cx (on foam) x 1 minute horizontal, x 1 min vertical  - VOR x1 (90 sec, 112 bpm, checkered background, walking fwd/bwd); horizontal only  - VOR x 1 (120 sec, 112 bpm, checkered background); vertical only  - Blaze pod taps with feet and hands while walking fwd/bwd x 2 min (red=hand, purple=foot)    - Cervical proprio retraining with laser (NOT PERFORMED TODAY)      HEP: Lico Thomson, VORx1, FTEC firm performed in corner of room and chair in front   Also provided handout for deep breathing and grounding techniques     Assessment: Pt tolerated treatment session well today with focus on balance. Did not use hurdles today due to c/o knee pain " following last session. Attempted to progress to checkerboard background for VORx1 walking fwd/bwd but needed to stop at 90 sec due to pt loss of balance anteriorly (able to catch herself but reports her eyes felt strained). Modified to standing in front of checkerboard background for VORx1 vertical with significant improvements; will trial on foam next time. Reviewed grounding techniques today due to pt feeling sway once stepping off foam surfaces. She will continue to benefit from skilled outpatient PT in order to maximize her function and reduce her symptoms.      Plan: Continue per plan of care.  MRI scheduled for March 5.          Outcome Measures Initial Eval  1- RE  2-8-2024       mCTSIB  - FTEO (firm)  - FTEC (firm)  - FTEO (foam)  - FTEC (foam)   Defer   30 sec  30 sec  30 sec (+)  30 sec (+)       DGI Defer 21/24       FGA Defer 22/30       10 meter Defer NT       DHI /100 22/100 (low)       JPET  R: > 4.5 deg (in 3rd yellow on L)  L: < 4.5 deg (outer green)

## 2024-02-29 ENCOUNTER — OFFICE VISIT (OUTPATIENT)
Facility: CLINIC | Age: 79
End: 2024-02-29
Payer: COMMERCIAL

## 2024-02-29 DIAGNOSIS — R42 VERTIGO: Primary | ICD-10-CM

## 2024-02-29 DIAGNOSIS — R42 DIZZINESS: ICD-10-CM

## 2024-02-29 PROCEDURE — 97112 NEUROMUSCULAR REEDUCATION: CPT | Performed by: PHYSICAL THERAPIST

## 2024-02-29 NOTE — PROGRESS NOTES
Daily Note   POC expires Auth Status Total   Visits  Start date  Expiration date PT/OT + Visit Limit? Co-Insurance   24 APPROVED 24 BOMN No and $30 Co-pay   24 [id number] 24                                    Visit/Unit Tracking  AUTH Status: APPROVED Date -12 1-15 1-18 1-22 1-25 1-29 2-1 2-5 2-8 2-12 2-15   Visits  Authed: 12 Used 1 1 1 1 1 1 1 1 1 1 1 1    Remaining  11 10 9 8 7 6 5 4 3 2 1 0     Visit/Unit Tracking  AUTH Status: [id number] Date  2-29            Visits  Authed: 12 Used 1 2 3             Remaining  11 10 9                Today's date: 2024  Patient name: Rosanne Stallings  : 1945  MRN: [id number]  Referring provider: Jackie Barragan CRNP  Dx:   Encounter Diagnosis     ICD-10-CM    1. Vertigo  R42       2. Dizziness  R42                        Subjective: Patient reports overall feeling okay, no changes since her last session      Objective: See treatment diary below      NMR:  - FTEO foam with head turns 30x horizontal, 30x vertical, 30x diagonals   - Tandem walking w/ head turns 10 ft x 10 laps  - Sidestepping foam beams with horizontal head turns: 6x down/back  - VOR cx (on foam) x 1 minute horizontal, x 1 min vertical (4/10 dizzziness)  - VOR cx (on foam) x 90 seconds horizontal, x 90 seconds vertical (4/10 dizzziness)  - VOR x1 (120 sec, walking fwd/bwd); horizontal only (5/10 dizziness)  - VOR x 1 (120 sec, walking fwd/bwd); vertical only (5/10 dizziness)        HEP: Lico Thomson, VORx1, FTEC firm performed in corner of room and chair in front   Also provided handout for deep breathing and grounding techniques     Assessment: Patient tolerated treatment well. Continued with VOR exercises, increasing duration up to 2 minutes with minimal increases in subjective dizziness. Minor postural sway observed on foam during head turns with no LOB. When sidestepping on foam with head turns patient displays fair use of stepping strategy  with posterior LOB, occasionally needing PT assist. Patient will continue to benefit from skilled outpatient PT in order to maximize her function and reduce her symptoms.      Plan: Continue per plan of care.  MRI scheduled for March 5.          Outcome Measures Initial Eval  1- RE  2-8-2024       mCTSIB  - FTEO (firm)  - FTEC (firm)  - FTEO (foam)  - FTEC (foam)   Defer   30 sec  30 sec  30 sec (+)  30 sec (+)       DGI Defer 21/24       FGA Defer 22/30       10 meter Defer NT       DHI /100 22/100 (low)       JPET  R: > 4.5 deg (in 3rd yellow on L)  L: < 4.5 deg (outer green)

## 2024-03-04 ENCOUNTER — OFFICE VISIT (OUTPATIENT)
Facility: CLINIC | Age: 79
End: 2024-03-04
Payer: COMMERCIAL

## 2024-03-04 DIAGNOSIS — R42 DIZZINESS: ICD-10-CM

## 2024-03-04 DIAGNOSIS — R42 VERTIGO: Primary | ICD-10-CM

## 2024-03-04 PROCEDURE — 97112 NEUROMUSCULAR REEDUCATION: CPT | Performed by: PHYSICAL THERAPIST

## 2024-03-04 NOTE — PROGRESS NOTES
Daily Note   POC expires Auth Status Total   Visits  Start date  Expiration date PT/OT + Visit Limit? Co-Insurance   24 APPROVED 24 BOMN No and $30 Co-pay   24 [id number] 24                                    Visit/Unit Tracking  AUTH Status: APPROVED Date -12 1-15 1-18 1-22 1-25 1-29 2-1 2-5 2-8 2-12 2-15   Visits  Authed: 12 Used 1 1 1 1 1 1 1 1 1 1 1 1    Remaining  11 10 9 8 7 6 5 4 3 2 1 0     Visit/Unit Tracking  AUTH Status: [id number] Date  2-29 3-4           Visits  Authed: 12 Used 1 2 3 4            Remaining  11 10 9 8               Today's date: 3/4/2024  Patient name: Rosanne Stallings  : 1945  MRN: [id number]  Referring provider: Jackie Barragan CRNP  Dx:   Encounter Diagnosis     ICD-10-CM    1. Vertigo  R42       2. Dizziness  R42                        Subjective: Patient reports feeling pretty good. She has MRI scheduled tomorrow.       Objective: See treatment diary below      NMR:  - FTEO foam with head turns 30x horizontal, 30x vertical, 30x diagonals   - Tandem walking w/ head turns 10 ft x 10 laps  - Sidestepping foam beams with horizontal head turns: 6x down/back  - VOR cx (on foam) x 1 minute horizontal, x 1 min vertical, x 1 min diagonals   - VOR x1 (120 sec, checkerboard, standing on foam); horizontal  - VOR x 1 (120 sec, checkerboard, standing on foam); vertical   - Treadmill 0.8 mph x 5 min with intermittent head turns   - Treadmill reverse 0.3 mph x 1 min       HEP: Lico Thomson, VORx1, FTEC firm performed in corner of room and chair in front   Also provided handout for deep breathing and grounding techniques     Assessment: Patient tolerated treatment well. Overall she continues to report consistent improvements in her symptoms. Incorporated grounding in between each exercise for symptom management. Initiated treadmill training today (pt had never been on a treadmill before) with head turns to further stimulate vestibular  system; monitor for knee pain. Patient will continue to benefit from skilled outpatient PT in order to maximize her function and reduce her symptoms.      Plan: Continue per plan of care.  MRI scheduled for March 5.          Outcome Measures Initial Eval  1- RE  2-8-2024       mCTSIB  - FTEO (firm)  - FTEC (firm)  - FTEO (foam)  - FTEC (foam)   Defer   30 sec  30 sec  30 sec (+)  30 sec (+)       DGI Defer 21/24       FGA Defer 22/30       10 meter Defer NT       DHI /100 22/100 (low)       JPET  R: > 4.5 deg (in 3rd yellow on L)  L: < 4.5 deg (outer green)

## 2024-03-05 ENCOUNTER — HOSPITAL ENCOUNTER (OUTPATIENT)
Dept: RADIOLOGY | Facility: HOSPITAL | Age: 79
Discharge: HOME | End: 2024-03-05
Attending: FAMILY MEDICINE
Payer: COMMERCIAL

## 2024-03-05 DIAGNOSIS — R42 VERTIGO: ICD-10-CM

## 2024-03-05 PROCEDURE — 70553 MRI BRAIN STEM W/O & W/DYE: CPT

## 2024-03-05 PROCEDURE — A9585 GADOBUTROL INJECTION: HCPCS | Performed by: FAMILY MEDICINE

## 2024-03-05 PROCEDURE — G1004 CDSM NDSC: HCPCS

## 2024-03-05 RX ORDER — GADOBUTROL 604.72 MG/ML
7 INJECTION INTRAVENOUS
Status: COMPLETED | OUTPATIENT
Start: 2024-03-05 | End: 2024-03-05

## 2024-03-05 RX ADMIN — GADOBUTROL 7 ML: 604.72 INJECTION INTRAVENOUS at 16:40

## 2024-03-07 ENCOUNTER — OFFICE VISIT (OUTPATIENT)
Facility: CLINIC | Age: 79
End: 2024-03-07
Payer: COMMERCIAL

## 2024-03-07 DIAGNOSIS — R42 VERTIGO: Primary | ICD-10-CM

## 2024-03-07 DIAGNOSIS — R42 DIZZINESS: ICD-10-CM

## 2024-03-07 PROCEDURE — 97112 NEUROMUSCULAR REEDUCATION: CPT | Performed by: PHYSICAL THERAPIST

## 2024-03-07 NOTE — PROGRESS NOTES
Daily Note   POC expires Auth Status Total   Visits  Start date  Expiration date PT/OT + Visit Limit? Co-Insurance   24 APPROVED 24 BOMN No and $30 Co-pay   24 [id number] 24                                    Visit/Unit Tracking  AUTH Status: APPROVED Date -12 1-15 1-18 1-22 1-25 1-29 2-1 2-5 2-8 2-12 2-15   Visits  Authed: 12 Used 1 1 1 1 1 1 1 1 1 1 1 1    Remaining  11 10 9 8 7 6 5 4 3 2 1 0     Visit/Unit Tracking  AUTH Status: [id number] Date  2-29 3-4 3-7          Visits  Authed: 12 Used 1 2 3 4 5           Remaining  11 10 9 8 7              Today's date: 3/7/2024  Patient name: Rosanne Stallings  : 1945  MRN: [id number]  Referring provider: Jackie Barragan CRNP  Dx:   Encounter Diagnosis     ICD-10-CM    1. Vertigo  R42       2. Dizziness  R42                    Subjective: Patient reports that she had no dizziness yesterday! Patient has not heard back regarding results of MRI.      Objective: See treatment diary below      NMR:  - FTEO foam with head turns 30x horizontal, 30x vertical, 30x diagonals  - Tandem walking w/ head turns 10 ft x 10 laps  - Sidestepping foam beams with horizontal head turns: 6x down/back  - VOR cx (on foam) x 1 minute horizontal, x 1 min vertical, x 1 min diagonals   - Treadmill 0.8 mph x 5 min with intermittent head turns   - Step ups on BOSU: 1 set, 30 reps B/L        HEP: Lico Thomson, VORx1, FTEC firm performed in corner of room and chair in front   Also provided handout for deep breathing and grounding techniques     Assessment: Patient tolerated treatment well. Overall she continues to report consistent improvements in her symptoms. No significant provocation of symptoms throughout the session. Mild unsteadiness and (+) postural sway with exercises on compliant surfaces with ability to use hip and stepping strategies. Patient will continue to benefit from skilled outpatient PT in order to maximize her function and  reduce her symptoms.      Plan: Continue per plan of care.  MRI scheduled for March 5.         Outcome Measures Initial Eval  1- RE  2-8-2024       mCTSIB  - FTEO (firm)  - FTEC (firm)  - FTEO (foam)  - FTEC (foam)   Defer   30 sec  30 sec  30 sec (+)  30 sec (+)       DGI Defer 21/24       FGA Defer 22/30       10 meter Defer NT       DHI /100 22/100 (low)       JPET  R: > 4.5 deg (in 3rd yellow on L)  L: < 4.5 deg (outer green)

## 2024-03-11 ENCOUNTER — OFFICE VISIT (OUTPATIENT)
Facility: CLINIC | Age: 79
End: 2024-03-11
Payer: COMMERCIAL

## 2024-03-11 DIAGNOSIS — R42 VERTIGO: Primary | ICD-10-CM

## 2024-03-11 DIAGNOSIS — R42 DIZZINESS: ICD-10-CM

## 2024-03-11 PROCEDURE — 97112 NEUROMUSCULAR REEDUCATION: CPT

## 2024-03-11 NOTE — PROGRESS NOTES
"Daily Note   POC expires Auth Status Total   Visits  Start date  Expiration date PT/OT + Visit Limit? Co-Insurance   24 APPROVED 24 BOMN No and $30 Co-pay   24 [id number] 24                                    Visit/Unit Tracking  AUTH Status: APPROVED Date  1-12 1-15 1-18 1-22 1-25 1-29 2-1 2-5 2-8 2-12 2-15   Visits  Authed: 12 Used 1 1 1 1 1 1 1 1 1 1 1 1    Remaining  11 10 9 8 7 6 5 4 3 2 1 0     Visit/Unit Tracking  AUTH Status: [id number] Date - 2-29 3-4 3-7 3-11         Visits  Authed: 12 Used 1 2 3 4 5 6          Remaining  11 10 9 8 7 6             Today's date: 3/7/2024  Patient name: Rosanne Stallings  : 1945  MRN: [id number]  Referring provider: Jackie Barragan CRNP  Dx:   Encounter Diagnosis     ICD-10-CM    1. Vertigo  R42       2. Dizziness  R42                    Subjective: Patient reports she feels \"a little woozy today, felt fine yesterday\" Patient has still not heard back regarding results of MRI.      Objective: See treatment diary below      NMR:  - FTEO foam with head turns 30x horizontal, 30x vertical, 30x diagonals  - Tandem walking w/ head turns 10 ft x 10 laps  - Sidestepping foam beams with horizontal head turns: 6x down/back  - Ambulation with holding 2 cards out to side, requiring her to perfrom head turns and then add cards together, 6 laps in long hallway  - VOR cx (on foam) x 1 minute horizontal, x 1 min vertical, x 1 min diagonals   - Ambulation with VOR cancel w/ ball, x 2 minutes horizontal, x 2 minutes vertical   - Step ups on BOSU: 1 set, 30 reps B/L        HEP: Lico Thomson, VORx1, FTEC firm performed in corner of room and chair in front   Also provided handout for deep breathing and grounding techniques     Assessment: Patient tolerated treatment well. Overall she continues to report consistent improvements in her symptoms, but not complete resolution. Patient requested to defer TM today due to knee pain. Incorporated " overground walking with head turns instead. . Mild unsteadiness and (+) postural sway with exercises on compliant surfaces (especially Airex beam) as well as performiong tandem gait immediately following stance on foam; but with ability to use hip and stepping strategies. Patient will continue to benefit from skilled outpatient PT in order to maximize her function and reduce her symptoms.      Plan: Continue per plan of care.  MRI completed March 5.         Outcome Measures Initial Eval  1- RE  2-8-2024       mCTSIB  - FTEO (firm)  - FTEC (firm)  - FTEO (foam)  - FTEC (foam)   Defer   30 sec  30 sec  30 sec (+)  30 sec (+)       DGI Defer 21/24       FGA Defer 22/30       10 meter Defer NT       DHI /100 22/100 (low)       JPET  R: > 4.5 deg (in 3rd yellow on L)  L: < 4.5 deg (outer green)

## 2024-03-14 ENCOUNTER — APPOINTMENT (OUTPATIENT)
Facility: CLINIC | Age: 79
End: 2024-03-14
Payer: COMMERCIAL

## 2024-03-18 ENCOUNTER — APPOINTMENT (OUTPATIENT)
Facility: CLINIC | Age: 79
End: 2024-03-18
Payer: COMMERCIAL

## 2024-03-21 ENCOUNTER — APPOINTMENT (OUTPATIENT)
Facility: CLINIC | Age: 79
End: 2024-03-21
Payer: COMMERCIAL

## 2024-03-21 DIAGNOSIS — R93.0 ABNORMAL MRI OF HEAD: Primary | ICD-10-CM

## 2024-03-22 ENCOUNTER — TELEPHONE (OUTPATIENT)
Age: 79
End: 2024-03-22

## 2024-03-22 NOTE — TELEPHONE ENCOUNTER
She doesn't really need the printed order for the CT scan since it is in the system but she requested it. Please order the labs for [facility] per message. If we choose resulting agency as [facility], she doesn't need to  that order as well since it will interface. Thank you Virgie

## 2024-03-22 NOTE — TELEPHONE ENCOUNTER
I don't see the message from [facility] that specifies what she needs.  I am presuming a creatinine level but would like to be sure.

## 2024-03-22 NOTE — TELEPHONE ENCOUNTER
There is no message from [facility]. I meant per message from Jose Ho, which states patient needs labs for CT Scan (when I wrote per message) Patient is requesting the standard labs required, prior to having a  CT scan Virgie

## 2024-03-22 NOTE — TELEPHONE ENCOUNTER
The patient needs a blood order for her CT exam and she needs the CT order as well. Please call the patient when the blood order is ready to take to [facility]. The patient will go to  and order the CT.

## 2024-03-23 DIAGNOSIS — I10 BENIGN ESSENTIAL HYPERTENSION: Primary | ICD-10-CM

## 2024-03-25 ENCOUNTER — APPOINTMENT (OUTPATIENT)
Facility: CLINIC | Age: 79
End: 2024-03-25
Payer: COMMERCIAL

## 2024-03-26 LAB
BUN SERPL-MCNC: 14 MG/DL (ref 8–27)
CREAT SERPL-MCNC: 0.86 MG/DL (ref 0.57–1)
EGFR: 70 ML/MIN/1.73

## 2024-03-28 ENCOUNTER — APPOINTMENT (OUTPATIENT)
Facility: CLINIC | Age: 79
End: 2024-03-28
Payer: COMMERCIAL

## 2024-04-04 ENCOUNTER — HOSPITAL ENCOUNTER (OUTPATIENT)
Dept: RADIOLOGY | Facility: HOSPITAL | Age: 79
Discharge: HOME | End: 2024-04-04
Attending: FAMILY MEDICINE
Payer: COMMERCIAL

## 2024-04-04 DIAGNOSIS — R93.0 ABNORMAL MRI OF HEAD: ICD-10-CM

## 2024-04-04 PROCEDURE — 70481 CT ORBIT/EAR/FOSSA W/DYE: CPT

## 2024-04-04 RX ADMIN — IOHEXOL 85 ML: 350 INJECTION, SOLUTION INTRAVENOUS at 10:10

## 2024-05-18 DIAGNOSIS — E78.5 HYPERLIPIDEMIA LDL GOAL <130: ICD-10-CM

## 2024-05-18 DIAGNOSIS — I10 BENIGN ESSENTIAL HYPERTENSION: ICD-10-CM

## 2024-05-19 RX ORDER — ATORVASTATIN CALCIUM 20 MG/1
20 TABLET, FILM COATED ORAL DAILY
Qty: 90 TABLET | Refills: 1 | Status: SHIPPED | OUTPATIENT
Start: 2024-05-19

## 2024-05-19 RX ORDER — LOSARTAN POTASSIUM 100 MG/1
100 TABLET ORAL DAILY
Qty: 90 TABLET | Refills: 1 | Status: SHIPPED | OUTPATIENT
Start: 2024-05-19

## 2024-05-19 RX ORDER — HYDROCHLOROTHIAZIDE 12.5 MG/1
12.5 TABLET ORAL DAILY
Qty: 90 TABLET | Refills: 1 | Status: SHIPPED | OUTPATIENT
Start: 2024-05-19

## 2024-06-08 DIAGNOSIS — E03.9 HYPOTHYROIDISM, UNSPECIFIED TYPE: ICD-10-CM

## 2024-06-08 LAB
ALBUMIN SERPL-MCNC: 4.3 G/DL (ref 3.8–4.8)
ALBUMIN/GLOB SERPL: 1.5 {RATIO} (ref 1.2–2.2)
ALP SERPL-CCNC: 77 IU/L (ref 44–121)
ALT SERPL-CCNC: 20 IU/L (ref 0–32)
AST SERPL-CCNC: 23 IU/L (ref 0–40)
BILIRUB SERPL-MCNC: 0.6 MG/DL (ref 0–1.2)
BUN SERPL-MCNC: 15 MG/DL (ref 8–27)
BUN/CREAT SERPL: 18 (ref 12–28)
CALCIUM SERPL-MCNC: 9.5 MG/DL (ref 8.7–10.3)
CHLORIDE SERPL-SCNC: 100 MMOL/L (ref 96–106)
CHOLEST SERPL-MCNC: 153 MG/DL (ref 100–199)
CO2 SERPL-SCNC: 25 MMOL/L (ref 20–29)
CREAT SERPL-MCNC: 0.83 MG/DL (ref 0.57–1)
EGFR: 73 ML/MIN/1.73
GLOBULIN SER-MCNC: 2.8 G/DL (ref 1.5–4.5)
GLUCOSE SERPL-MCNC: 108 MG/DL (ref 70–99)
HBA1C MFR BLD: 5.8 % (ref 4.8–5.6)
HDLC SERPL-MCNC: 53 MG/DL
LDLC SERPL CALC-MCNC: 84 MG/DL (ref 0–99)
LDLC/HDLC SERPL: 1.6 RATIO (ref 0–3.2)
MICRODELETION SYND BLD/T FISH: NORMAL
POTASSIUM SERPL-SCNC: 4.7 MMOL/L (ref 3.5–5.2)
PROT SERPL-MCNC: 7.1 G/DL (ref 6–8.5)
SL AMB VLDL CHOLESTEROL CALC: 16 MG/DL (ref 5–40)
SODIUM SERPL-SCNC: 138 MMOL/L (ref 134–144)
TRIGL SERPL-MCNC: 87 MG/DL (ref 0–149)
TSH SERPL DL<=0.005 MIU/L-ACNC: 2.02 UIU/ML (ref 0.45–4.5)

## 2024-06-08 RX ORDER — LEVOTHYROXINE SODIUM 88 UG/1
88 TABLET ORAL DAILY
Qty: 90 TABLET | Refills: 1 | Status: SHIPPED | OUTPATIENT
Start: 2024-06-08

## 2024-06-17 ENCOUNTER — OFFICE VISIT (OUTPATIENT)
Dept: FAMILY MEDICINE CLINIC | Facility: CLINIC | Age: 79
End: 2024-06-17
Payer: COMMERCIAL

## 2024-06-17 VITALS
BODY MASS INDEX: 29.15 KG/M2 | HEIGHT: 62 IN | RESPIRATION RATE: 18 BRPM | TEMPERATURE: 98.1 F | SYSTOLIC BLOOD PRESSURE: 128 MMHG | WEIGHT: 158.4 LBS | HEART RATE: 63 BPM | DIASTOLIC BLOOD PRESSURE: 60 MMHG

## 2024-06-17 DIAGNOSIS — R73.03 PREDIABETES: ICD-10-CM

## 2024-06-17 DIAGNOSIS — E03.8 OTHER SPECIFIED HYPOTHYROIDISM: ICD-10-CM

## 2024-06-17 DIAGNOSIS — E78.5 HYPERLIPIDEMIA LDL GOAL <130: ICD-10-CM

## 2024-06-17 DIAGNOSIS — I10 BENIGN ESSENTIAL HYPERTENSION: Primary | ICD-10-CM

## 2024-06-17 PROCEDURE — G2211 COMPLEX E/M VISIT ADD ON: HCPCS | Performed by: FAMILY MEDICINE

## 2024-06-17 PROCEDURE — 99214 OFFICE O/P EST MOD 30 MIN: CPT | Performed by: FAMILY MEDICINE

## 2024-06-17 RX ORDER — AMLODIPINE BESYLATE 5 MG/1
5 TABLET ORAL DAILY
Qty: 90 TABLET | Refills: 1 | Status: SHIPPED | OUTPATIENT
Start: 2024-06-17

## 2024-06-17 RX ORDER — ATENOLOL 25 MG/1
25 TABLET ORAL DAILY
Qty: 90 TABLET | Refills: 1 | Status: SHIPPED | OUTPATIENT
Start: 2024-06-17

## 2024-06-17 NOTE — PROGRESS NOTES
Assessment/Plan:    No problem-specific Assessment & Plan notes found for this encounter.    Prediabetes/htn/hld stable  Continue meds/diet  Encourage exercise  Q6m f/u  Hypothyroid stable, continue meds, tsh q6m     Diagnoses and all orders for this visit:    Benign essential hypertension  -     Comprehensive metabolic panel; Future  -     amLODIPine (NORVASC) 5 mg tablet; Take 1 tablet (5 mg total) by mouth daily  -     atenolol (TENORMIN) 25 mg tablet; Take 1 tablet (25 mg total) by mouth daily    Prediabetes  -     Hemoglobin A1C; Future    Other specified hypothyroidism  -     TSH, 3rd generation; Future    Hyperlipidemia LDL goal <130        Return in about 6 months (around 12/17/2024).    Subjective:      Patient ID: Rosanne Stallings is a 78 y.o. female.    Chief Complaint   Patient presents with    Follow-up     6 month f/u DEEPTHI Saldaña  Taking all meds  Very active in yard  Lots of water  No formal exercise program  Tolerating meds    The following portions of the patient's history were reviewed and updated as appropriate: allergies, current medications, past family history, past medical history, past social history, past surgical history and problem list.    Review of Systems   Constitutional:  Negative for chills, fever and unexpected weight change.         Current Outpatient Medications   Medication Sig Dispense Refill    amLODIPine (NORVASC) 5 mg tablet Take 1 tablet (5 mg total) by mouth daily 90 tablet 1    atenolol (TENORMIN) 25 mg tablet Take 1 tablet (25 mg total) by mouth daily 90 tablet 1    atorvastatin (LIPITOR) 20 mg tablet Take 1 tablet (20 mg total) by mouth daily 90 tablet 1    hydroCHLOROthiazide 12.5 mg tablet Take 1 tablet (12.5 mg total) by mouth daily 90 tablet 1    levothyroxine 88 mcg tablet Take 1 tablet (88 mcg total) by mouth daily 90 tablet 1    losartan (COZAAR) 100 MG tablet Take 1 tablet (100 mg total) by mouth daily 90 tablet 1    meclizine (ANTIVERT)  "12.5 MG tablet Take 1 tablet (12.5 mg total) by mouth every 12 (twelve) hours as needed for dizziness 30 tablet 0     No current facility-administered medications for this visit.       Objective:    /60   Pulse 63   Temp 98.1 °F (36.7 °C)   Resp 18   Ht 5' 2\" (1.575 m)   Wt 71.8 kg (158 lb 6.4 oz)   BMI 28.97 kg/m²        Physical Exam  Vitals and nursing note reviewed.   Constitutional:       General: She is not in acute distress.     Appearance: She is well-developed. She is not ill-appearing.   HENT:      Head: Normocephalic.      Right Ear: Tympanic membrane normal.      Left Ear: Tympanic membrane normal.   Eyes:      General: No scleral icterus.     Conjunctiva/sclera: Conjunctivae normal.   Cardiovascular:      Rate and Rhythm: Normal rate and regular rhythm.      Heart sounds: No murmur heard.  Pulmonary:      Effort: Pulmonary effort is normal. No respiratory distress.      Breath sounds: No wheezing.   Abdominal:      Palpations: Abdomen is soft.   Musculoskeletal:         General: No deformity.      Cervical back: Neck supple.      Left lower leg: No edema.   Skin:     General: Skin is warm and dry.      Coloration: Skin is not pale.   Neurological:      Mental Status: She is alert.      Motor: No weakness.      Gait: Gait normal.   Psychiatric:         Mood and Affect: Mood normal.         Behavior: Behavior normal.         Thought Content: Thought content normal.                Lit Mcnally DO    "

## 2024-07-10 ENCOUNTER — HOSPITAL ENCOUNTER (OUTPATIENT)
Dept: RADIOLOGY | Facility: HOSPITAL | Age: 79
Discharge: HOME | End: 2024-07-10
Attending: FAMILY MEDICINE
Payer: COMMERCIAL

## 2024-07-10 VITALS — WEIGHT: 158 LBS | HEIGHT: 62 IN | BODY MASS INDEX: 29.08 KG/M2

## 2024-07-10 DIAGNOSIS — Z12.31 BREAST CANCER SCREENING BY MAMMOGRAM: ICD-10-CM

## 2024-07-10 PROCEDURE — 77067 SCR MAMMO BI INCL CAD: CPT

## 2024-07-10 PROCEDURE — 77063 BREAST TOMOSYNTHESIS BI: CPT

## 2024-08-30 ENCOUNTER — OFFICE VISIT (OUTPATIENT)
Dept: FAMILY MEDICINE CLINIC | Facility: CLINIC | Age: 79
End: 2024-08-30
Payer: COMMERCIAL

## 2024-08-30 VITALS
WEIGHT: 157 LBS | BODY MASS INDEX: 28.89 KG/M2 | DIASTOLIC BLOOD PRESSURE: 80 MMHG | HEIGHT: 62 IN | RESPIRATION RATE: 20 BRPM | SYSTOLIC BLOOD PRESSURE: 148 MMHG | TEMPERATURE: 98.9 F | HEART RATE: 60 BPM

## 2024-08-30 DIAGNOSIS — R21 SKIN ERUPTION: Primary | ICD-10-CM

## 2024-08-30 PROCEDURE — G2211 COMPLEX E/M VISIT ADD ON: HCPCS | Performed by: NURSE PRACTITIONER

## 2024-08-30 PROCEDURE — 99213 OFFICE O/P EST LOW 20 MIN: CPT | Performed by: NURSE PRACTITIONER

## 2024-08-30 RX ORDER — CLOTRIMAZOLE AND BETAMETHASONE DIPROPIONATE 10; .64 MG/G; MG/G
CREAM TOPICAL 2 TIMES DAILY
Qty: 45 G | Refills: 0 | Status: SHIPPED | OUTPATIENT
Start: 2024-08-30

## 2024-08-30 NOTE — PROGRESS NOTES
Ambulatory Visit  Name: Rosanne Stallings      : 1945      MRN: [id number]  Encounter Provider: PATRIC Mackey  Encounter Date: 2024   Encounter department: [facility]    Will cover with Lotrisone.  Advised to use this for 2 weeks and follow-up if no improvement  Assessment & Plan   1. Skin eruption  -     clotrimazole-betamethasone (LOTRISONE) 1-0.05 % cream; Apply topically 2 (two) times a day     History of Present Illness     Here today with complaints of an itchy patch on her left buttock that has been ongoing for the past week.  Thought initially this was bug bites.  She has tried Benadryl cream topically without relief.  No new detergents, soaps, bath products.        Review of Systems   Constitutional: Negative.    Skin:  Positive for rash.     Current Outpatient Medications on File Prior to Visit   Medication Sig Dispense Refill   • amLODIPine (NORVASC) 5 mg tablet Take 1 tablet (5 mg total) by mouth daily 90 tablet 1   • atenolol (TENORMIN) 25 mg tablet Take 1 tablet (25 mg total) by mouth daily 90 tablet 1   • atorvastatin (LIPITOR) 20 mg tablet Take 1 tablet (20 mg total) by mouth daily 90 tablet 1   • hydroCHLOROthiazide 12.5 mg tablet Take 1 tablet (12.5 mg total) by mouth daily 90 tablet 1   • levothyroxine 88 mcg tablet Take 1 tablet (88 mcg total) by mouth daily 90 tablet 1   • losartan (COZAAR) 100 MG tablet Take 1 tablet (100 mg total) by mouth daily 90 tablet 1   • meclizine (ANTIVERT) 12.5 MG tablet Take 1 tablet (12.5 mg total) by mouth every 12 (twelve) hours as needed for dizziness 30 tablet 0     No current facility-administered medications on file prior to visit.      Social History     Tobacco Use   • Smoking status: Never     Passive exposure: Past   • Smokeless tobacco: Never   Vaping Use   • Vaping status: Never Used   Substance and Sexual Activity   • Alcohol use: No   • Drug use: No   • Sexual activity: Not Currently     Partners: Male  "    Objective     /80   Pulse 60   Temp 98.9 °F (37.2 °C)   Resp 20   Ht 5' 2\" (1.575 m)   Wt 71.2 kg (157 lb)   BMI 28.72 kg/m²     Physical Exam  Vitals and nursing note reviewed.   Constitutional:       General: She is not in acute distress.     Appearance: Normal appearance. She is well-developed. She is not diaphoretic.   Eyes:      Conjunctiva/sclera: Conjunctivae normal.   Pulmonary:      Effort: Pulmonary effort is normal. No respiratory distress.   Skin:     Comments: Left lateral buttocks with circular area that appears dry without any significant erythema.  3 pinpoint scabbed areas   Neurological:      Mental Status: She is alert.   Psychiatric:         Mood and Affect: Mood normal.         Behavior: Behavior normal.       Administrative Statements           "

## 2024-09-18 ENCOUNTER — OFFICE VISIT (OUTPATIENT)
Dept: FAMILY MEDICINE CLINIC | Facility: CLINIC | Age: 79
End: 2024-09-18
Payer: COMMERCIAL

## 2024-09-18 VITALS
RESPIRATION RATE: 16 BRPM | SYSTOLIC BLOOD PRESSURE: 142 MMHG | WEIGHT: 156.8 LBS | DIASTOLIC BLOOD PRESSURE: 86 MMHG | BODY MASS INDEX: 28.85 KG/M2 | TEMPERATURE: 97.8 F | HEART RATE: 56 BPM | HEIGHT: 62 IN

## 2024-09-18 DIAGNOSIS — R10.9 FLANK PAIN: Primary | ICD-10-CM

## 2024-09-18 LAB
SL AMB  POCT GLUCOSE, UA: NEGATIVE
SL AMB LEUKOCYTE ESTERASE,UA: NEGATIVE
SL AMB POCT BILIRUBIN,UA: NEGATIVE
SL AMB POCT BLOOD,UA: NORMAL
SL AMB POCT CLARITY,UA: CLEAR
SL AMB POCT COLOR,UA: YELLOW
SL AMB POCT KETONES,UA: NEGATIVE
SL AMB POCT NITRITE,UA: NEGATIVE
SL AMB POCT PH,UA: 7
SL AMB POCT SPECIFIC GRAVITY,UA: 1.01
SL AMB POCT URINE PROTEIN: NEGATIVE
SL AMB POCT UROBILINOGEN: NORMAL

## 2024-09-18 PROCEDURE — 99213 OFFICE O/P EST LOW 20 MIN: CPT | Performed by: INTERNAL MEDICINE

## 2024-09-18 PROCEDURE — G2211 COMPLEX E/M VISIT ADD ON: HCPCS | Performed by: INTERNAL MEDICINE

## 2024-09-18 PROCEDURE — 81003 URINALYSIS AUTO W/O SCOPE: CPT | Performed by: INTERNAL MEDICINE

## 2024-09-18 NOTE — PROGRESS NOTES
"Ambulatory Visit  Name: Rosanne Stallings      : 1945      MRN: [id number]  Encounter Provider: Claritza Montero MD  Encounter Date: 2024   Encounter department: [facility]    Assessment & Plan  Flank pain  She had some blood on UA here in the office.  Advised tylenol for pain control and continued moist heat.  Still suspect musculoskeletal etiology, but will send for US to r/o pathology due to hematuria.  Orders:    POCT urine dip auto non-scope    US kidney and bladder; Future       History of Present Illness     Has had a dull pain in right side for 4-5 days, it makes her queasy sometimes. Thre is no blood in her urine.  Using heat with some relief.  She has had this in the past and was told this was musculoskeletal, but is concerned about her kidneys. There is no hematuria or history of stones.   No history of cholecystectomy. Food does not make it worse.          Review of Systems   Constitutional: Negative.  Negative for fever and unexpected weight change.   Respiratory: Negative.     Cardiovascular: Negative.    Genitourinary: Negative.    Musculoskeletal:  Positive for back pain.           Objective     /86   Pulse 56   Temp 97.8 °F (36.6 °C)   Resp 16   Ht 5' 2\" (1.575 m)   Wt 71.1 kg (156 lb 12.8 oz)   BMI 28.68 kg/m²     Physical Exam  Constitutional:       Appearance: Normal appearance.   HENT:      Head: Normocephalic and atraumatic.   Eyes:      Pupils: Pupils are equal, round, and reactive to light.   Cardiovascular:      Rate and Rhythm: Normal rate and regular rhythm.      Heart sounds: No murmur heard.     No friction rub. No gallop.   Pulmonary:      Effort: Pulmonary effort is normal.      Breath sounds: Normal breath sounds. No wheezing or rales.   Abdominal:      General: Abdomen is flat. Bowel sounds are normal.      Palpations: Abdomen is soft.      Tenderness: There is no abdominal tenderness. There is right CVA tenderness.   Musculoskeletal:         " General: No swelling.   Neurological:      Mental Status: She is alert.

## 2024-09-19 ENCOUNTER — HOSPITAL ENCOUNTER (OUTPATIENT)
Dept: RADIOLOGY | Facility: HOSPITAL | Age: 79
Discharge: HOME | End: 2024-09-19
Attending: INTERNAL MEDICINE
Payer: COMMERCIAL

## 2024-09-19 DIAGNOSIS — R10.9 FLANK PAIN: ICD-10-CM

## 2024-09-19 PROCEDURE — 76775 US EXAM ABDO BACK WALL LIM: CPT

## 2024-09-30 ENCOUNTER — TELEPHONE (OUTPATIENT)
Age: 79
End: 2024-09-30

## 2024-09-30 NOTE — TELEPHONE ENCOUNTER
Rosanne called in stated that she just developed pain when urinating this morning and she would like to see the Doctor. Last visit was 9/13 with Dr Montero. Testing was done but she doesn't know if this new symptom is for her previous visit. Please Advise Thank you no appointment this week with her or Dr Mcnally.

## 2024-10-01 ENCOUNTER — OFFICE VISIT (OUTPATIENT)
Dept: FAMILY MEDICINE CLINIC | Facility: CLINIC | Age: 79
End: 2024-10-01
Payer: COMMERCIAL

## 2024-10-01 VITALS
HEIGHT: 62 IN | DIASTOLIC BLOOD PRESSURE: 70 MMHG | BODY MASS INDEX: 29.08 KG/M2 | TEMPERATURE: 98.7 F | HEART RATE: 66 BPM | RESPIRATION RATE: 16 BRPM | SYSTOLIC BLOOD PRESSURE: 134 MMHG | WEIGHT: 158 LBS

## 2024-10-01 DIAGNOSIS — Z00.00 MEDICARE ANNUAL WELLNESS VISIT, SUBSEQUENT: Primary | ICD-10-CM

## 2024-10-01 DIAGNOSIS — E03.8 OTHER SPECIFIED HYPOTHYROIDISM: ICD-10-CM

## 2024-10-01 DIAGNOSIS — R30.0 DYSURIA: ICD-10-CM

## 2024-10-01 DIAGNOSIS — I10 BENIGN ESSENTIAL HYPERTENSION: ICD-10-CM

## 2024-10-01 DIAGNOSIS — R10.9 RIGHT FLANK PAIN: ICD-10-CM

## 2024-10-01 DIAGNOSIS — Z23 ENCOUNTER FOR IMMUNIZATION: ICD-10-CM

## 2024-10-01 LAB
SL AMB  POCT GLUCOSE, UA: NEGATIVE
SL AMB LEUKOCYTE ESTERASE,UA: NEGATIVE
SL AMB POCT BILIRUBIN,UA: NEGATIVE
SL AMB POCT BLOOD,UA: NORMAL
SL AMB POCT CLARITY,UA: CLEAR
SL AMB POCT COLOR,UA: YELLOW
SL AMB POCT KETONES,UA: NEGATIVE
SL AMB POCT NITRITE,UA: NEGATIVE
SL AMB POCT PH,UA: 7
SL AMB POCT SPECIFIC GRAVITY,UA: 1.03
SL AMB POCT URINE PROTEIN: NEGATIVE
SL AMB POCT UROBILINOGEN: 0.2

## 2024-10-01 PROCEDURE — 99214 OFFICE O/P EST MOD 30 MIN: CPT | Performed by: FAMILY MEDICINE

## 2024-10-01 PROCEDURE — 81003 URINALYSIS AUTO W/O SCOPE: CPT | Performed by: FAMILY MEDICINE

## 2024-10-01 PROCEDURE — 90662 IIV NO PRSV INCREASED AG IM: CPT

## 2024-10-01 PROCEDURE — G0008 ADMIN INFLUENZA VIRUS VAC: HCPCS

## 2024-10-01 PROCEDURE — G0439 PPPS, SUBSEQ VISIT: HCPCS | Performed by: FAMILY MEDICINE

## 2024-10-01 NOTE — PROGRESS NOTES
Assessment/Plan:    No problem-specific Assessment & Plan notes found for this encounter.    Flank pain  Check CT abdomen  Doubt MSK at this time    Hematuria on ua  Chronic    Htn stable on meds       Diagnoses and all orders for this visit:    Medicare annual wellness visit, subsequent    Dysuria  -     POCT urine dip auto non-scope    Other specified hypothyroidism    Benign essential hypertension  -     Creatinine, serum; Future  -     BUN; Future  -     Creatinine, serum  -     BUN    Right flank pain  -     CT abdomen pelvis w contrast; Future    Encounter for immunization  -     influenza vaccine, high-dose, PF 0.5 mL (Fluzone High Dose)      Return if symptoms worsen or fail to improve.    Subjective:      Patient ID: Rosanne Stallings is a 78 y.o. female.    Chief Complaint   Patient presents with    Difficulty Urinating     CMA         HPI  Flank pain right side  Ongoing  Us renal neg  Still has it  On/off  Dull quality  No alleviators  Can last hours  Even at rest    U/a 1030  Trace blood    The following portions of the patient's history were reviewed and updated as appropriate: allergies, current medications, past family history, past medical history, past social history, past surgical history and problem list.    Review of Systems   Constitutional:  Negative for chills and fever.         Current Outpatient Medications   Medication Sig Dispense Refill    amLODIPine (NORVASC) 5 mg tablet Take 1 tablet (5 mg total) by mouth daily 90 tablet 1    atenolol (TENORMIN) 25 mg tablet Take 1 tablet (25 mg total) by mouth daily 90 tablet 1    atorvastatin (LIPITOR) 20 mg tablet Take 1 tablet (20 mg total) by mouth daily 90 tablet 1    hydroCHLOROthiazide 12.5 mg tablet Take 1 tablet (12.5 mg total) by mouth daily 90 tablet 1    levothyroxine 88 mcg tablet Take 1 tablet (88 mcg total) by mouth daily 90 tablet 1    losartan (COZAAR) 100 MG tablet Take 1 tablet (100 mg total) by mouth daily 90 tablet 1     "clotrimazole-betamethasone (LOTRISONE) 1-0.05 % cream Apply topically 2 (two) times a day (Patient not taking: Reported on 9/18/2024) 45 g 0     No current facility-administered medications for this visit.       Objective:    /70   Pulse 66   Temp 98.7 °F (37.1 °C) (Tympanic)   Resp 16   Ht 5' 2\" (1.575 m)   Wt 71.7 kg (158 lb)   BMI 28.90 kg/m²        Physical Exam  Vitals and nursing note reviewed.   Constitutional:       General: She is not in acute distress.     Appearance: She is well-developed. She is not ill-appearing.   HENT:      Head: Normocephalic.      Right Ear: Tympanic membrane normal.      Left Ear: Tympanic membrane normal.   Eyes:      General: No scleral icterus.     Conjunctiva/sclera: Conjunctivae normal.   Cardiovascular:      Rate and Rhythm: Normal rate and regular rhythm.      Heart sounds: No murmur heard.  Pulmonary:      Effort: Pulmonary effort is normal. No respiratory distress.      Breath sounds: No wheezing.   Abdominal:      General: There is no distension.      Palpations: Abdomen is soft.      Tenderness: There is abdominal tenderness.      Comments: Somewhat reproducible right flank pain   Musculoskeletal:         General: No deformity.      Cervical back: Neck supple.      Right lower leg: No edema.      Left lower leg: No edema.   Skin:     General: Skin is warm and dry.      Coloration: Skin is not jaundiced or pale.   Neurological:      Mental Status: She is alert.      Motor: No weakness.      Gait: Gait normal.   Psychiatric:         Mood and Affect: Mood normal.         Behavior: Behavior normal.         Thought Content: Thought content normal.                Lit Mcnally DO    "

## 2024-10-02 PROBLEM — Z00.00 MEDICARE ANNUAL WELLNESS VISIT, SUBSEQUENT: Status: ACTIVE | Noted: 2024-10-02

## 2024-10-02 LAB
BUN SERPL-MCNC: 12 MG/DL (ref 8–27)
CREAT SERPL-MCNC: 0.68 MG/DL (ref 0.57–1)
EGFR: 90 ML/MIN/1.73

## 2024-10-02 NOTE — PROGRESS NOTES
Ambulatory Visit  Name: Rosanne Stallings      : 1945      MRN: [id number]  Encounter Provider: Lit Mcnally DO  Encounter Date: 10/1/2024   Encounter department: [facility]    Assessment & Plan  Dysuria    Orders:    POCT urine dip auto non-scope      Other specified hypothyroidism           Benign essential hypertension    Orders:    Creatinine, serum; Future    BUN; Future    Creatinine, serum    BUN      Right flank pain    Orders:    CT abdomen pelvis w contrast; Future      Encounter for immunization    Orders:    influenza vaccine, high-dose, PF 0.5 mL (Fluzone High Dose)      Medicare annual wellness visit, subsequent              Preventive health issues were discussed with patient, and age appropriate screening tests were ordered as noted in patient's After Visit Summary. Personalized health advice and appropriate referrals for health education or preventive services given if needed, as noted in patient's After Visit Summary.    History of Present Illness     Difficulty Urinating        Patient Care Team:  Lit Mcnally DO as PCP - General    Review of Systems   Genitourinary:  Positive for dysuria.     Medical History Reviewed by provider this encounter:  Tobacco  Allergies  Meds  Problems  Med Hx  Surg Hx  Fam Hx       Annual Wellness Visit Questionnaire   Rosanne is here for her Subsequent Wellness visit. Last Medicare Wellness visit information reviewed, patient interviewed and updates made to the record today.      Health Risk Assessment:   Patient rates overall health as very good. Patient feels that their physical health rating is same. Patient is very satisfied with their life. Eyesight was rated as same. Hearing was rated as same. Patient feels that their emotional and mental health rating is same. Patients states they are never, rarely angry. Patient states they are never, rarely unusually tired/fatigued. Pain experienced in the last 7 days has been some.  Patient's pain rating has been 5/10. Patient states that she has experienced no weight loss or gain in last 6 months.     Fall Risk Screening:   In the past year, patient has experienced: no history of falling in past year      Urinary Incontinence Screening:   Patient has not leaked urine accidently in the last six months.     Home Safety:  Patient does not have trouble with stairs inside or outside of their home. Patient has working smoke alarms and has working carbon monoxide detector. Home safety hazards include: none.     Nutrition:   Current diet is Regular.     Medications:   Patient is currently taking over-the-counter supplements. OTC medications include: see medication list. Patient is able to manage medications.     Activities of Daily Living (ADLs)/Instrumental Activities of Daily Living (IADLs):   Walk and transfer into and out of bed and chair?: Yes  Dress and groom yourself?: Yes    Bathe or shower yourself?: Yes    Feed yourself? Yes  Do your laundry/housekeeping?: Yes  Manage your money, pay your bills and track your expenses?: Yes  Make your own meals?: Yes    Do your own shopping?: Yes    Previous Hospitalizations:   Any hospitalizations or ED visits within the last 12 months?: No      Advance Care Planning:   Living will: Yes    Durable POA for healthcare: Yes    Advanced directive: Yes    End of Life Decisions reviewed with patient: No      Cognitive Screening:   Provider or family/friend/caregiver concerned regarding cognition?: No    PREVENTIVE SCREENINGS      Cardiovascular Screening:    General: Screening Not Indicated and History Lipid Disorder      Diabetes Screening:     General: Screening Current      Colorectal Cancer Screening:     General: Screening Current      Prostate Cancer Screening:    General: History Prostate Cancer and Screening Not Indicated      Breast Cancer Screening:     General: Screening Current      Cervical Cancer Screening:    General: Screening Not Indicated       "Osteoporosis Screening:    General: Risks and Benefits Discussed      Abdominal Aortic Aneurysm (AAA) Screening:    Risk factors include: tobacco use        General: Screening Not Indicated      Lung Cancer Screening:     General: Screening Not Indicated      Hepatitis C Screening:    General: Screening Current    Hep C Screening Accepted: No     Screening, Brief Intervention, and Referral to Treatment (SBIRT)    Screening  Typical number of drinks in a day: 0  Typical number of drinks in a week: 0  Interpretation: Low risk drinking behavior.    Single Item Drug Screening:  How often have you used an illegal drug (including marijuana) or a prescription medication for non-medical reasons in the past year? never    Single Item Drug Screen Score: 0  Interpretation: Negative screen for possible drug use disorder    Other Counseling Topics:   Car/seat belt/driving safety and regular weightbearing exercise.     Social Determinants of Health     Financial Resource Strain: Low Risk  (6/29/2023)    Overall Financial Resource Strain (CARDIA)     Difficulty of Paying Living Expenses: Not hard at all   Transportation Needs: No Transportation Needs (6/29/2023)    PRAPARE - Transportation     Lack of Transportation (Medical): No     Lack of Transportation (Non-Medical): No     No results found.    Objective     /70   Pulse 66   Temp 98.7 °F (37.1 °C) (Tympanic)   Resp 16   Ht 5' 2\" (1.575 m)   Wt 71.7 kg (158 lb)   BMI 28.90 kg/m²     Physical Exam    "

## 2024-10-02 NOTE — PATIENT INSTRUCTIONS
Medicare Preventive Visit Patient Instructions  Thank you for completing your Welcome to Medicare Visit or Medicare Annual Wellness Visit today. Your next wellness visit will be due in one year (10/3/2025).  The screening/preventive services that you may require over the next 5-10 years are detailed below. Some tests may not apply to you based off risk factors and/or age. Screening tests ordered at today's visit but not completed yet may show as past due. Also, please note that scanned in results may not display below.  Preventive Screenings:  Service Recommendations Previous Testing/Comments   Colorectal Cancer Screening  * Colonoscopy    * Fecal Occult Blood Test (FOBT)/Fecal Immunochemical Test (FIT)  * Fecal DNA/Cologuard Test  * Flexible Sigmoidoscopy Age: 45-75 years old   Colonoscopy: every 10 years (may be performed more frequently if at higher risk)  OR  FOBT/FIT: every 1 year  OR  Cologuard: every 3 years  OR  Sigmoidoscopy: every 5 years  Screening may be recommended earlier than age 45 if at higher risk for colorectal cancer. Also, an individualized decision between you and your healthcare provider will decide whether screening between the ages of 76-85 would be appropriate. Colonoscopy: Not on file  FOBT/FIT: Not on file  Cologuard: Not on file  Sigmoidoscopy: Not on file          Breast Cancer Screening Age: 40+ years old  Frequency: every 1-2 years  Not required if history of left and right mastectomy Mammogram: 07/10/2024        Cervical Cancer Screening Between the ages of 21-29, pap smear recommended once every 3 years.   Between the ages of 30-65, can perform pap smear with HPV co-testing every 5 years.   Recommendations may differ for women with a history of total hysterectomy, cervical cancer, or abnormal pap smears in past. Pap Smear: Not on file        Hepatitis C Screening Once for adults born between 1945 and 1965  More frequently in patients at high risk for Hepatitis C Hep C Antibody:  12/03/2018        Diabetes Screening 1-2 times per year if you're at risk for diabetes or have pre-diabetes Fasting glucose: No results in last 5 years (No results in last 5 years)  A1C: 5.8 % (6/7/2024)      Cholesterol Screening Once every 5 years if you don't have a lipid disorder. May order more often based on risk factors. Lipid panel: 06/07/2024          Other Preventive Screenings Covered by Medicare:  Abdominal Aortic Aneurysm (AAA) Screening: covered once if your at risk. You're considered to be at risk if you have a family history of AAA.  Lung Cancer Screening: covers low dose CT scan once per year if you meet all of the following conditions: (1) Age 55-77; (2) No signs or symptoms of lung cancer; (3) Current smoker or have quit smoking within the last 15 years; (4) You have a tobacco smoking history of at least 20 pack years (packs per day multiplied by number of years you smoked); (5) You get a written order from a healthcare provider.  Glaucoma Screening: covered annually if you're considered high risk: (1) You have diabetes OR (2) Family history of glaucoma OR (3)  aged 50 and older OR (4)  American aged 65 and older  Osteoporosis Screening: covered every 2 years if you meet one of the following conditions: (1) You're estrogen deficient and at risk for osteoporosis based off medical history and other findings; (2) Have a vertebral abnormality; (3) On glucocorticoid therapy for more than 3 months; (4) Have primary hyperparathyroidism; (5) On osteoporosis medications and need to assess response to drug therapy.   Last bone density test (DXA Scan): 10/25/2023.  HIV Screening: covered annually if you're between the age of 15-65. Also covered annually if you are younger than 15 and older than 65 with risk factors for HIV infection. For pregnant patients, it is covered up to 3 times per pregnancy.    Immunizations:  Immunization Recommendations   Influenza Vaccine Annual influenza  vaccination during flu season is recommended for all persons aged >= 6 months who do not have contraindications   Pneumococcal Vaccine   * Pneumococcal conjugate vaccine = PCV13 (Prevnar 13), PCV15 (Vaxneuvance), PCV20 (Prevnar 20)  * Pneumococcal polysaccharide vaccine = PPSV23 (Pneumovax) Adults 19-65 yo with certain risk factors or if 65+ yo  If never received any pneumonia vaccine: recommend Prevnar 20 (PCV20)  Give PCV20 if previously received 1 dose of PCV13 or PPSV23   Hepatitis B Vaccine 3 dose series if at intermediate or high risk (ex: diabetes, end stage renal disease, liver disease)   Respiratory syncytial virus (RSV) Vaccine - COVERED BY MEDICARE PART D  * RSVPreF3 (Arexvy) CDC recommends that adults 60 years of age and older may receive a single dose of RSV vaccine using shared clinical decision-making (SCDM)   Tetanus (Td) Vaccine - COST NOT COVERED BY MEDICARE PART B Following completion of primary series, a booster dose should be given every 10 years to maintain immunity against tetanus. Td may also be given as tetanus wound prophylaxis.   Tdap Vaccine - COST NOT COVERED BY MEDICARE PART B Recommended at least once for all adults. For pregnant patients, recommended with each pregnancy.   Shingles Vaccine (Shingrix) - COST NOT COVERED BY MEDICARE PART B  2 shot series recommended in those 19 years and older who have or will have weakened immune systems or those 50 years and older     Health Maintenance Due:      Topic Date Due   • Breast Cancer Screening: Mammogram  07/10/2025   • Hepatitis C Screening  Completed   • Colorectal Cancer Screening  Discontinued     Immunizations Due:      Topic Date Due   • COVID-19 Vaccine (5 - 2023-24 season) 09/01/2024     Advance Directives   What are advance directives?  Advance directives are legal documents that state your wishes and plans for medical care. These plans are made ahead of time in case you lose your ability to make decisions for yourself. Advance  directives can apply to any medical decision, such as the treatments you want, and if you want to donate organs.   What are the types of advance directives?  There are many types of advance directives, and each state has rules about how to use them. You may choose a combination of any of the following:  Living will:  This is a written record of the treatment you want. You can also choose which treatments you do not want, which to limit, and which to stop at a certain time. This includes surgery, medicine, IV fluid, and tube feedings.   Durable power of  for healthcare (DPAHC):  This is a written record that states who you want to make healthcare choices for you when you are unable to make them for yourself. This person, called a proxy, is usually a family member or a friend. You may choose more than 1 proxy.  Do not resuscitate (DNR) order:  A DNR order is used in case your heart stops beating or you stop breathing. It is a request not to have certain forms of treatment, such as CPR. A DNR order may be included in other types of advance directives.  Medical directive:  This covers the care that you want if you are in a coma, near death, or unable to make decisions for yourself. You can list the treatments you want for each condition. Treatment may include pain medicine, surgery, blood transfusions, dialysis, IV or tube feedings, and a ventilator (breathing machine).  Values history:  This document has questions about your views, beliefs, and how you feel and think about life. This information can help others choose the care that you would choose.  Why are advance directives important?  An advance directive helps you control your care. Although spoken wishes may be used, it is better to have your wishes written down. Spoken wishes can be misunderstood, or not followed. Treatments may be given even if you do not want them. An advance directive may make it easier for your family to make difficult choices about  your care.   Weight Management   Why it is important to manage your weight:  Being overweight increases your risk of health conditions such as heart disease, high blood pressure, type 2 diabetes, and certain types of cancer. It can also increase your risk for osteoarthritis, sleep apnea, and other respiratory problems. Aim for a slow, steady weight loss. Even a small amount of weight loss can lower your risk of health problems.  How to lose weight safely:  A safe and healthy way to lose weight is to eat fewer calories and get regular exercise. You can lose up about 1 pound a week by decreasing the number of calories you eat by 500 calories each day.   Healthy meal plan for weight management:  A healthy meal plan includes a variety of foods, contains fewer calories, and helps you stay healthy. A healthy meal plan includes the following:  Eat whole-grain foods more often.  A healthy meal plan should contain fiber. Fiber is the part of grains, fruits, and vegetables that is not broken down by your body. Whole-grain foods are healthy and provide extra fiber in your diet. Some examples of whole-grain foods are whole-wheat breads and pastas, oatmeal, brown rice, and bulgur.  Eat a variety of vegetables every day.  Include dark, leafy greens such as spinach, kale, zuleyka greens, and mustard greens. Eat yellow and orange vegetables such as carrots, sweet potatoes, and winter squash.   Eat a variety of fruits every day.  Choose fresh or canned fruit (canned in its own juice or light syrup) instead of juice. Fruit juice has very little or no fiber.  Eat low-fat dairy foods.  Drink fat-free (skim) milk or 1% milk. Eat fat-free yogurt and low-fat cottage cheese. Try low-fat cheeses such as mozzarella and other reduced-fat cheeses.  Choose meat and other protein foods that are low in fat.  Choose beans or other legumes such as split peas or lentils. Choose fish, skinless poultry (chicken or turkey), or lean cuts of red meat  (beef or pork). Before you cook meat or poultry, cut off any visible fat.   Use less fat and oil.  Try baking foods instead of frying them. Add less fat, such as margarine, sour cream, regular salad dressing and mayonnaise to foods. Eat fewer high-fat foods. Some examples of high-fat foods include french fries, doughnuts, ice cream, and cakes.  Eat fewer sweets.  Limit foods and drinks that are high in sugar. This includes candy, cookies, regular soda, and sweetened drinks.  Exercise:  Exercise at least 30 minutes per day on most days of the week. Some examples of exercise include walking, biking, dancing, and swimming. You can also fit in more physical activity by taking the stairs instead of the elevator or parking farther away from stores. Ask your healthcare provider about the best exercise plan for you.      © Copyright [employer] 2018 Information is for End User's use only and may not be sold, redistributed or otherwise used for commercial purposes. All illustrations and images included in CareNotes® are the copyrighted property of A.D.A[employer]M., Inc[employer] or [employer]

## 2024-10-08 ENCOUNTER — HOSPITAL ENCOUNTER (OUTPATIENT)
Dept: RADIOLOGY | Facility: HOSPITAL | Age: 79
Discharge: HOME | End: 2024-10-08
Attending: FAMILY MEDICINE
Payer: COMMERCIAL

## 2024-10-08 DIAGNOSIS — R10.9 RIGHT FLANK PAIN: ICD-10-CM

## 2024-10-08 PROCEDURE — 74177 CT ABD & PELVIS W/CONTRAST: CPT

## 2024-10-08 RX ADMIN — IOHEXOL 100 ML: 350 INJECTION, SOLUTION INTRAVENOUS at 13:56

## 2024-10-12 DIAGNOSIS — R93.5 ABNORMAL CT OF THE ABDOMEN: Primary | ICD-10-CM

## 2024-11-01 PROBLEM — Z00.00 MEDICARE ANNUAL WELLNESS VISIT, SUBSEQUENT: Status: RESOLVED | Noted: 2024-10-02 | Resolved: 2024-11-01

## 2024-11-07 ENCOUNTER — HOSPITAL ENCOUNTER (OUTPATIENT)
Dept: RADIOLOGY | Facility: HOSPITAL | Age: 79
Discharge: HOME | End: 2024-11-07
Attending: FAMILY MEDICINE
Payer: COMMERCIAL

## 2024-11-07 DIAGNOSIS — R93.5 ABNORMAL CT OF THE ABDOMEN: ICD-10-CM

## 2024-11-07 PROCEDURE — 74183 MRI ABD W/O CNTR FLWD CNTR: CPT

## 2024-11-07 PROCEDURE — A9585 GADOBUTROL INJECTION: HCPCS | Performed by: FAMILY MEDICINE

## 2024-11-07 RX ORDER — GADOBUTROL 604.72 MG/ML
7 INJECTION INTRAVENOUS
Status: COMPLETED | OUTPATIENT
Start: 2024-11-07 | End: 2024-11-07

## 2024-11-07 RX ADMIN — GADOBUTROL 7 ML: 604.72 INJECTION INTRAVENOUS at 15:18

## 2024-11-08 ENCOUNTER — TELEPHONE (OUTPATIENT)
Dept: FAMILY MEDICINE CLINIC | Facility: CLINIC | Age: 79
End: 2024-11-08

## 2024-11-08 NOTE — TELEPHONE ENCOUNTER
Form received from patient regarding application for insurance. Form completed and faxed. Confirmation received and LMOM to make patient aware. NFA.

## 2024-11-13 ENCOUNTER — TELEPHONE (OUTPATIENT)
Dept: FAMILY MEDICINE CLINIC | Facility: CLINIC | Age: 79
End: 2024-11-13

## 2024-11-13 NOTE — TELEPHONE ENCOUNTER
Liseth from [facility] called to let Dr. Mcnally know that there are significant findings on the patients MRI of Abdomen from 11/7.  Everything is in Epic.

## 2024-11-14 ENCOUNTER — RESULTS FOLLOW-UP (OUTPATIENT)
Dept: FAMILY MEDICINE CLINIC | Facility: CLINIC | Age: 79
End: 2024-11-14

## 2024-11-14 DIAGNOSIS — E78.5 HYPERLIPIDEMIA LDL GOAL <130: ICD-10-CM

## 2024-11-14 DIAGNOSIS — I10 BENIGN ESSENTIAL HYPERTENSION: ICD-10-CM

## 2024-11-14 RX ORDER — LOSARTAN POTASSIUM 100 MG/1
100 TABLET ORAL DAILY
Qty: 90 TABLET | Refills: 1 | Status: SHIPPED | OUTPATIENT
Start: 2024-11-14

## 2024-11-14 RX ORDER — HYDROCHLOROTHIAZIDE 12.5 MG/1
12.5 TABLET ORAL DAILY
Qty: 90 TABLET | Refills: 1 | Status: SHIPPED | OUTPATIENT
Start: 2024-11-14

## 2024-11-14 RX ORDER — ATORVASTATIN CALCIUM 20 MG/1
20 TABLET, FILM COATED ORAL DAILY
Qty: 90 TABLET | Refills: 1 | Status: SHIPPED | OUTPATIENT
Start: 2024-11-14

## 2024-12-06 DIAGNOSIS — E03.9 HYPOTHYROIDISM, UNSPECIFIED TYPE: ICD-10-CM

## 2024-12-06 RX ORDER — LEVOTHYROXINE SODIUM 88 UG/1
88 TABLET ORAL DAILY
Qty: 90 TABLET | Refills: 1 | Status: SHIPPED | OUTPATIENT
Start: 2024-12-06

## 2024-12-10 LAB
ALBUMIN SERPL-MCNC: 4.2 G/DL (ref 3.8–4.8)
ALP SERPL-CCNC: 75 IU/L (ref 44–121)
ALT SERPL-CCNC: 21 IU/L (ref 0–32)
AST SERPL-CCNC: 19 IU/L (ref 0–40)
BILIRUB SERPL-MCNC: 0.5 MG/DL (ref 0–1.2)
BUN SERPL-MCNC: 13 MG/DL (ref 8–27)
BUN/CREAT SERPL: 16 (ref 12–28)
CALCIUM SERPL-MCNC: 9.6 MG/DL (ref 8.7–10.3)
CHLORIDE SERPL-SCNC: 101 MMOL/L (ref 96–106)
CO2 SERPL-SCNC: 25 MMOL/L (ref 20–29)
CREAT SERPL-MCNC: 0.81 MG/DL (ref 0.57–1)
EGFR: 75 ML/MIN/1.73
GLOBULIN SER-MCNC: 2.6 G/DL (ref 1.5–4.5)
GLUCOSE SERPL-MCNC: 99 MG/DL (ref 70–99)
HBA1C MFR BLD: 6 % (ref 4.8–5.6)
POTASSIUM SERPL-SCNC: 4.9 MMOL/L (ref 3.5–5.2)
PROT SERPL-MCNC: 6.8 G/DL (ref 6–8.5)
SODIUM SERPL-SCNC: 140 MMOL/L (ref 134–144)
TSH SERPL DL<=0.005 MIU/L-ACNC: 2.31 UIU/ML (ref 0.45–4.5)

## 2024-12-11 ENCOUNTER — RESULTS FOLLOW-UP (OUTPATIENT)
Dept: FAMILY MEDICINE CLINIC | Facility: CLINIC | Age: 79
End: 2024-12-11

## 2024-12-14 DIAGNOSIS — I10 BENIGN ESSENTIAL HYPERTENSION: ICD-10-CM

## 2024-12-15 RX ORDER — AMLODIPINE BESYLATE 5 MG/1
5 TABLET ORAL DAILY
Qty: 90 TABLET | Refills: 1 | Status: SHIPPED | OUTPATIENT
Start: 2024-12-15

## 2024-12-17 ENCOUNTER — OFFICE VISIT (OUTPATIENT)
Dept: FAMILY MEDICINE CLINIC | Facility: CLINIC | Age: 79
End: 2024-12-17
Payer: COMMERCIAL

## 2024-12-17 VITALS
SYSTOLIC BLOOD PRESSURE: 126 MMHG | WEIGHT: 156 LBS | BODY MASS INDEX: 28.71 KG/M2 | DIASTOLIC BLOOD PRESSURE: 70 MMHG | HEIGHT: 62 IN | TEMPERATURE: 97.2 F | RESPIRATION RATE: 18 BRPM | OXYGEN SATURATION: 97 % | HEART RATE: 56 BPM

## 2024-12-17 DIAGNOSIS — R73.03 PREDIABETES: ICD-10-CM

## 2024-12-17 DIAGNOSIS — E03.8 OTHER SPECIFIED HYPOTHYROIDISM: Primary | ICD-10-CM

## 2024-12-17 DIAGNOSIS — I10 BENIGN ESSENTIAL HYPERTENSION: ICD-10-CM

## 2024-12-17 DIAGNOSIS — E78.5 HYPERLIPIDEMIA LDL GOAL <130: ICD-10-CM

## 2024-12-17 DIAGNOSIS — K86.2 PANCREAS CYST: ICD-10-CM

## 2024-12-17 DIAGNOSIS — Z13.1 SCREENING FOR DIABETES MELLITUS (DM): ICD-10-CM

## 2024-12-17 DIAGNOSIS — Z13.220 SCREENING FOR LIPID DISORDERS: ICD-10-CM

## 2024-12-17 PROCEDURE — 99214 OFFICE O/P EST MOD 30 MIN: CPT | Performed by: FAMILY MEDICINE

## 2024-12-17 PROCEDURE — G2211 COMPLEX E/M VISIT ADD ON: HCPCS | Performed by: FAMILY MEDICINE

## 2024-12-17 NOTE — PROGRESS NOTES
"Assessment/Plan:    No problem-specific Assessment & Plan notes found for this encounter.         There are no diagnoses linked to this encounter.          No follow-ups on file.    Subjective:      Patient ID: Rosanne Stallings is a 78 y.o. female.    Chief Complaint   Patient presents with    Follow-up     6 months  klpn       HPI  Taking all meds  Lots of salad and chicken  Some exercise walking    The following portions of the patient's history were reviewed and updated as appropriate: allergies, current medications, past family history, past medical history, past social history, past surgical history and problem list.    Review of Systems      Current Outpatient Medications   Medication Sig Dispense Refill    amLODIPine (NORVASC) 5 mg tablet Take 1 tablet (5 mg total) by mouth daily 90 tablet 1    atenolol (TENORMIN) 25 mg tablet Take 1 tablet (25 mg total) by mouth daily 90 tablet 1    atorvastatin (LIPITOR) 20 mg tablet Take 1 tablet (20 mg total) by mouth daily 90 tablet 1    hydroCHLOROthiazide 12.5 mg tablet Take 1 tablet (12.5 mg total) by mouth daily 90 tablet 1    levothyroxine 88 mcg tablet Take 1 tablet (88 mcg total) by mouth daily 90 tablet 1    losartan (COZAAR) 100 MG tablet Take 1 tablet (100 mg total) by mouth daily 90 tablet 1     No current facility-administered medications for this visit.       Objective:    /70   Pulse 56   Temp (!) 97.2 °F (36.2 °C)   Resp 18   Ht 5' 2\" (1.575 m)   Wt 70.8 kg (156 lb)   SpO2 97%   BMI 28.53 kg/m²        Physical Exam           Lit Mcnally, DO    "

## 2024-12-18 PROBLEM — K86.2 PANCREAS CYST: Status: ACTIVE | Noted: 2024-12-18

## 2024-12-18 NOTE — PROGRESS NOTES
Assessment/Plan:    No problem-specific Assessment & Plan notes found for this encounter.    Right flank pain better for some reason  Pancreas cyst aware, f/u MRI in 2y    Hypothyroid/HLD/htn stable  Continue meds  F/u q6m     Diagnoses and all orders for this visit:    Other specified hypothyroidism  -     TSH, 3rd generation; Future  -     TSH, 3rd generation    Screening for diabetes mellitus (DM)  -     Comprehensive metabolic panel; Future  -     Comprehensive metabolic panel    Screening for lipid disorders  -     Lipid Panel with Direct LDL reflex; Future  -     Lipid Panel with Direct LDL reflex    Prediabetes  -     Hemoglobin A1C; Future  -     Hemoglobin A1C    Benign essential hypertension    Hyperlipidemia LDL goal <130    Pancreas cyst  Comments:  4mm on MRI 11/2024  repeat in 2 years        Return in about 6 months (around 6/17/2025) for Recheck.    Subjective:      Patient ID: Rosanne Stallings is a 78 y.o. female.    Chief Complaint   Patient presents with    Follow-up     6 months  rmklpn       HPI    Taking all meds  Lots of salad and chicken  Some exercise walking  Labs reviewed  Tsh wnl  A1c 6  Fbs 99  Social with sisters    The following portions of the patient's history were reviewed and updated as appropriate: allergies, current medications, past family history, past medical history, past social history, past surgical history and problem list.    Review of Systems   Constitutional:  Negative for chills and fever.   Respiratory:  Negative for shortness of breath.    Cardiovascular:  Negative for chest pain.         Current Outpatient Medications   Medication Sig Dispense Refill    amLODIPine (NORVASC) 5 mg tablet Take 1 tablet (5 mg total) by mouth daily 90 tablet 1    atenolol (TENORMIN) 25 mg tablet Take 1 tablet (25 mg total) by mouth daily 90 tablet 1    atorvastatin (LIPITOR) 20 mg tablet Take 1 tablet (20 mg total) by mouth daily 90 tablet 1    hydroCHLOROthiazide 12.5 mg tablet Take 1  "tablet (12.5 mg total) by mouth daily 90 tablet 1    levothyroxine 88 mcg tablet Take 1 tablet (88 mcg total) by mouth daily 90 tablet 1    losartan (COZAAR) 100 MG tablet Take 1 tablet (100 mg total) by mouth daily 90 tablet 1     No current facility-administered medications for this visit.       Objective:    /70   Pulse 56   Temp (!) 97.2 °F (36.2 °C)   Resp 18   Ht 5' 2\" (1.575 m)   Wt 70.8 kg (156 lb)   SpO2 97%   BMI 28.53 kg/m²        Physical Exam  Vitals and nursing note reviewed.   Constitutional:       General: She is not in acute distress.     Appearance: She is well-developed. She is not ill-appearing.   HENT:      Head: Normocephalic.      Right Ear: Tympanic membrane normal.      Left Ear: Tympanic membrane normal.   Eyes:      General: No scleral icterus.     Conjunctiva/sclera: Conjunctivae normal.   Cardiovascular:      Rate and Rhythm: Normal rate and regular rhythm.   Pulmonary:      Effort: Pulmonary effort is normal. No respiratory distress.      Breath sounds: No wheezing.   Abdominal:      Palpations: Abdomen is soft.   Musculoskeletal:         General: No deformity.      Cervical back: Neck supple.      Right lower leg: No edema.      Left lower leg: No edema.   Skin:     General: Skin is warm and dry.      Coloration: Skin is not jaundiced or pale.   Neurological:      Mental Status: She is alert.      Motor: No weakness.      Gait: Gait normal.   Psychiatric:         Mood and Affect: Mood normal.         Behavior: Behavior normal.         Thought Content: Thought content normal.                Lit Mcnally, DO      "

## 2025-01-24 DIAGNOSIS — I10 BENIGN ESSENTIAL HYPERTENSION: ICD-10-CM

## 2025-01-24 RX ORDER — ATENOLOL 25 MG/1
25 TABLET ORAL DAILY
Qty: 90 TABLET | Refills: 1 | Status: SHIPPED | OUTPATIENT
Start: 2025-01-24

## 2025-05-06 DIAGNOSIS — E78.5 HYPERLIPIDEMIA LDL GOAL <130: ICD-10-CM

## 2025-05-06 DIAGNOSIS — I10 BENIGN ESSENTIAL HYPERTENSION: ICD-10-CM

## 2025-05-07 DIAGNOSIS — E78.5 HYPERLIPIDEMIA LDL GOAL <130: ICD-10-CM

## 2025-05-07 DIAGNOSIS — I10 BENIGN ESSENTIAL HYPERTENSION: ICD-10-CM

## 2025-05-07 RX ORDER — ATORVASTATIN CALCIUM 20 MG/1
20 TABLET, FILM COATED ORAL DAILY
Qty: 90 TABLET | Refills: 1 | Status: SHIPPED | OUTPATIENT
Start: 2025-05-07

## 2025-05-07 RX ORDER — HYDROCHLOROTHIAZIDE 12.5 MG/1
12.5 TABLET ORAL DAILY
Qty: 90 TABLET | Refills: 1 | Status: SHIPPED | OUTPATIENT
Start: 2025-05-07

## 2025-05-07 RX ORDER — LOSARTAN POTASSIUM 100 MG/1
100 TABLET ORAL DAILY
Qty: 90 TABLET | Refills: 1 | Status: SHIPPED | OUTPATIENT
Start: 2025-05-07

## 2025-05-08 ENCOUNTER — OFFICE VISIT (OUTPATIENT)
Dept: FAMILY MEDICINE CLINIC | Facility: CLINIC | Age: 80
End: 2025-05-08
Payer: MEDICARE

## 2025-05-08 VITALS
DIASTOLIC BLOOD PRESSURE: 70 MMHG | WEIGHT: 158 LBS | BODY MASS INDEX: 29.08 KG/M2 | RESPIRATION RATE: 16 BRPM | HEART RATE: 69 BPM | HEIGHT: 62 IN | TEMPERATURE: 97.7 F | SYSTOLIC BLOOD PRESSURE: 128 MMHG

## 2025-05-08 DIAGNOSIS — R07.0 THROAT DISCOMFORT: Primary | ICD-10-CM

## 2025-05-08 DIAGNOSIS — I10 BENIGN ESSENTIAL HYPERTENSION: ICD-10-CM

## 2025-05-08 DIAGNOSIS — Z12.31 BREAST CANCER SCREENING BY MAMMOGRAM: ICD-10-CM

## 2025-05-08 DIAGNOSIS — E03.8 OTHER SPECIFIED HYPOTHYROIDISM: ICD-10-CM

## 2025-05-08 PROCEDURE — G2211 COMPLEX E/M VISIT ADD ON: HCPCS | Performed by: FAMILY MEDICINE

## 2025-05-08 PROCEDURE — 99214 OFFICE O/P EST MOD 30 MIN: CPT | Performed by: FAMILY MEDICINE

## 2025-05-08 RX ORDER — HYDROCHLOROTHIAZIDE 12.5 MG/1
12.5 TABLET ORAL DAILY
Qty: 90 TABLET | Refills: 0 | OUTPATIENT
Start: 2025-05-08

## 2025-05-08 RX ORDER — LOSARTAN POTASSIUM 100 MG/1
100 TABLET ORAL DAILY
Qty: 90 TABLET | Refills: 0 | OUTPATIENT
Start: 2025-05-08

## 2025-05-08 RX ORDER — METHYLPREDNISOLONE 4 MG/1
TABLET ORAL
Qty: 21 TABLET | Refills: 0 | Status: SHIPPED | OUTPATIENT
Start: 2025-05-08 | End: 2025-05-14

## 2025-05-08 RX ORDER — ATORVASTATIN CALCIUM 20 MG/1
20 TABLET, FILM COATED ORAL DAILY
Qty: 90 TABLET | Refills: 0 | OUTPATIENT
Start: 2025-05-08

## 2025-05-08 NOTE — PROGRESS NOTES
Name: Rosanne Stallings      : 1945      MRN: [id number]  Encounter Provider: Lit Mcnally DO  Encounter Date: 2025   Encounter department: [facility]  :  Assessment & Plan  Breast cancer screening by mammogram  yearly  Orders:    Mammo screening bilateral w 3d and cad; Future    Throat discomfort  No mass or white spots seen  Can check US  Offer ENT if white spot recurs or for more detailed exam or if abnormal US  Orders:    US head neck soft tissue; Future    methylPREDNISolone 4 MG tablet therapy pack; Use as directed on package    Ambulatory Referral to Otolaryngology; Future    Benign essential hypertension  Stable on meds       Other specified hypothyroidism  Tsh wnl              History of Present Illness   HPI  Dentist saw left sided white spot  Few wks  No pain  Pndrip left side  Left ear clogged  Clear phlegm in am  No fever    Review of Systems   Constitutional:  Negative for chills and fever.   HENT:  Negative for trouble swallowing.      Family History   Problem Relation Age of Onset    Diabetes Sister     Breast cancer Sister 70    No Known Problems Sister     No Known Problems Sister     No Known Problems Sister     Alzheimer's disease Sister     Cancer Mother         possibly gallbladder     Heart disease Father     Heart attack Father     No Known Problems Brother     No Known Problems Son     No Known Problems Son     No Known Problems Son     No Known Problems Brother       Social History     Tobacco Use    Smoking status: Never     Passive exposure: Past    Smokeless tobacco: Never   Vaping Use    Vaping status: Never Used   Substance Use Topics    Alcohol use: No    Drug use: No      Current Outpatient Medications   Medication Instructions    amLODIPine (NORVASC) 5 mg, Oral, Daily    atenolol (TENORMIN) 25 mg, Oral, Daily    atorvastatin (LIPITOR) 20 mg, Oral, Daily    hydroCHLOROthiazide 12.5 mg, Oral, Daily    levothyroxine 88 mcg, Oral, Daily    losartan  "(COZAAR) 100 mg, Oral, Daily    methylPREDNISolone 4 MG tablet therapy pack Use as directed on package     >>>>>>>>  No follow-ups on file.  >>>>>>>>    [POCT]  No results found for this or any previous visit (from the past 24 hours).    Visit Vitals  /70   Pulse 69   Temp 97.7 °F (36.5 °C)   Resp 16   Ht 5' 2\" (1.575 m)   Wt 71.7 kg (158 lb)   BMI 28.90 kg/m²   OB Status Postmenopausal   Smoking Status Never   BSA 1.73 m²        Objective   /70   Pulse 69   Temp 97.7 °F (36.5 °C)   Resp 16   Ht 5' 2\" (1.575 m)   Wt 71.7 kg (158 lb)   BMI 28.90 kg/m²      Physical Exam  Vitals and nursing note reviewed.   Constitutional:       General: She is not in acute distress.     Appearance: She is well-developed. She is not ill-appearing.   HENT:      Head: Normocephalic.      Right Ear: Tympanic membrane normal. There is no impacted cerumen.      Left Ear: Tympanic membrane normal. There is no impacted cerumen.      Mouth/Throat:      Mouth: Mucous membranes are moist.      Pharynx: Oropharynx is clear. No oropharyngeal exudate or posterior oropharyngeal erythema.   Eyes:      General: No scleral icterus.     Conjunctiva/sclera: Conjunctivae normal.   Neck:      Vascular: No carotid bruit.   Cardiovascular:      Rate and Rhythm: Normal rate and regular rhythm.      Heart sounds: No murmur heard.  Pulmonary:      Effort: Pulmonary effort is normal. No respiratory distress.      Breath sounds: No wheezing.   Abdominal:      Palpations: Abdomen is soft.   Musculoskeletal:         General: No deformity.      Cervical back: Neck supple. Tenderness present.      Right lower leg: No edema.      Left lower leg: No edema.   Lymphadenopathy:      Cervical: No cervical adenopathy.   Skin:     General: Skin is warm and dry.      Coloration: Skin is not jaundiced or pale.   Neurological:      Mental Status: She is alert.      Motor: No weakness.      Gait: Gait normal.   Psychiatric:         Behavior: Behavior normal.   "       Thought Content: Thought content normal.

## 2025-05-09 NOTE — ASSESSMENT & PLAN NOTE
No mass or white spots seen  Can check US  Offer ENT if white spot recurs or for more detailed exam or if abnormal US  Orders:    US head neck soft tissue; Future    methylPREDNISolone 4 MG tablet therapy pack; Use as directed on package    Ambulatory Referral to Otolaryngology; Future

## 2025-05-15 ENCOUNTER — HOSPITAL ENCOUNTER (OUTPATIENT)
Dept: RADIOLOGY | Facility: HOSPITAL | Age: 80
Discharge: HOME | End: 2025-05-15
Attending: FAMILY MEDICINE
Payer: MEDICARE

## 2025-05-15 DIAGNOSIS — R07.0 THROAT DISCOMFORT: ICD-10-CM

## 2025-05-15 PROCEDURE — 76536 US EXAM OF HEAD AND NECK: CPT

## 2025-05-22 ENCOUNTER — RESULTS FOLLOW-UP (OUTPATIENT)
Dept: FAMILY MEDICINE CLINIC | Facility: CLINIC | Age: 80
End: 2025-05-22

## 2025-06-03 ENCOUNTER — RESULTS FOLLOW-UP (OUTPATIENT)
Dept: FAMILY MEDICINE CLINIC | Facility: CLINIC | Age: 80
End: 2025-06-03

## 2025-06-03 LAB
ALBUMIN SERPL-MCNC: 4.2 G/DL (ref 3.8–4.8)
ALP SERPL-CCNC: 71 IU/L (ref 44–121)
ALT SERPL-CCNC: 15 IU/L (ref 0–32)
AST SERPL-CCNC: 16 IU/L (ref 0–40)
BILIRUB SERPL-MCNC: 0.5 MG/DL (ref 0–1.2)
BUN SERPL-MCNC: 11 MG/DL (ref 8–27)
BUN/CREAT SERPL: 14 (ref 12–28)
CALCIUM SERPL-MCNC: 9.3 MG/DL (ref 8.7–10.3)
CHLORIDE SERPL-SCNC: 100 MMOL/L (ref 96–106)
CHOLEST SERPL-MCNC: 146 MG/DL (ref 100–199)
CO2 SERPL-SCNC: 21 MMOL/L (ref 20–29)
CREAT SERPL-MCNC: 0.8 MG/DL (ref 0.57–1)
EGFR: 75 ML/MIN/1.73
EST. AVERAGE GLUCOSE BLD GHB EST-MCNC: 117 MG/DL
GLOBULIN SER-MCNC: 2.4 G/DL (ref 1.5–4.5)
GLUCOSE SERPL-MCNC: 106 MG/DL (ref 70–99)
HBA1C MFR BLD: 5.7 % (ref 4.8–5.6)
HDLC SERPL-MCNC: 50 MG/DL
LDLC SERPL CALC-MCNC: 81 MG/DL (ref 0–99)
LDLC/HDLC SERPL: 1.6 RATIO (ref 0–3.2)
MICRODELETION SYND BLD/T FISH: NORMAL
POTASSIUM SERPL-SCNC: 3.8 MMOL/L (ref 3.5–5.2)
PROT SERPL-MCNC: 6.6 G/DL (ref 6–8.5)
SL AMB VLDL CHOLESTEROL CALC: 15 MG/DL (ref 5–40)
SODIUM SERPL-SCNC: 139 MMOL/L (ref 134–144)
TRIGL SERPL-MCNC: 75 MG/DL (ref 0–149)
TSH SERPL DL<=0.005 MIU/L-ACNC: 0.48 UIU/ML (ref 0.45–4.5)

## 2025-06-09 DIAGNOSIS — E03.9 HYPOTHYROIDISM, UNSPECIFIED TYPE: ICD-10-CM

## 2025-06-09 DIAGNOSIS — I10 BENIGN ESSENTIAL HYPERTENSION: ICD-10-CM

## 2025-06-09 RX ORDER — AMLODIPINE BESYLATE 5 MG/1
5 TABLET ORAL DAILY
Qty: 90 TABLET | Refills: 1 | Status: SHIPPED | OUTPATIENT
Start: 2025-06-09

## 2025-06-09 RX ORDER — LEVOTHYROXINE SODIUM 88 UG/1
88 TABLET ORAL DAILY
Qty: 90 TABLET | Refills: 1 | Status: SHIPPED | OUTPATIENT
Start: 2025-06-09

## 2025-06-13 ENCOUNTER — RA CDI HCC (OUTPATIENT)
Dept: OTHER | Facility: HOSPITAL | Age: 80
End: 2025-06-13

## 2025-06-15 PROBLEM — R31.9 HEMATURIA: Status: RESOLVED | Noted: 2023-06-28 | Resolved: 2025-06-15

## 2025-06-15 PROBLEM — R07.0 THROAT DISCOMFORT: Status: RESOLVED | Noted: 2025-05-08 | Resolved: 2025-06-15

## 2025-06-15 PROBLEM — S39.012A LOW BACK STRAIN: Status: RESOLVED | Noted: 2022-02-15 | Resolved: 2025-06-15

## 2025-06-17 ENCOUNTER — OFFICE VISIT (OUTPATIENT)
Dept: FAMILY MEDICINE CLINIC | Facility: CLINIC | Age: 80
End: 2025-06-17
Payer: MEDICARE

## 2025-06-17 VITALS
SYSTOLIC BLOOD PRESSURE: 132 MMHG | RESPIRATION RATE: 18 BRPM | BODY MASS INDEX: 28.89 KG/M2 | HEART RATE: 64 BPM | WEIGHT: 157 LBS | DIASTOLIC BLOOD PRESSURE: 74 MMHG | HEIGHT: 62 IN | TEMPERATURE: 98.6 F

## 2025-06-17 DIAGNOSIS — E03.8 OTHER SPECIFIED HYPOTHYROIDISM: ICD-10-CM

## 2025-06-17 DIAGNOSIS — R73.03 PREDIABETES: ICD-10-CM

## 2025-06-17 DIAGNOSIS — I10 BENIGN ESSENTIAL HYPERTENSION: Primary | ICD-10-CM

## 2025-06-17 PROCEDURE — G2211 COMPLEX E/M VISIT ADD ON: HCPCS | Performed by: FAMILY MEDICINE

## 2025-06-17 PROCEDURE — 99214 OFFICE O/P EST MOD 30 MIN: CPT | Performed by: FAMILY MEDICINE

## 2025-06-17 RX ORDER — ATENOLOL 25 MG/1
25 TABLET ORAL DAILY
Qty: 90 TABLET | Refills: 1 | Status: SHIPPED | OUTPATIENT
Start: 2025-06-17

## 2025-06-17 NOTE — PROGRESS NOTES
"Name: Rosanne Stallings      : 1945      MRN: [id number]  Encounter Provider: Lit Mcnally DO  Encounter Date: 2025   Encounter department: [facility]  :  Assessment & Plan  Benign essential hypertension  Stable on meds  Orders:    Comprehensive metabolic panel; Future    atenolol (TENORMIN) 25 mg tablet; Take 1 tablet (25 mg total) by mouth daily    Prediabetes  Watch carbs and diet, smaller dinners  Orders:    Hemoglobin A1C; Future    Other specified hypothyroidism  Stable on LT4  Orders:    TSH, 3rd generation; Future       History of Present Illness   HPI  Taking her meds  Walking  Active lifestyle  Salads  Controls portions  Dinner is her main meal  Prediabetes with fbs 106 aware  A1c 5.7  Tsh wnl    Review of Systems   Respiratory:  Negative for shortness of breath.    Cardiovascular:  Negative for chest pain.     Family History[1] was reviewed  Social History[2] was reviewed    Current Outpatient Medications   Medication Instructions    amLODIPine (NORVASC) 5 mg, Oral, Daily    atenolol (TENORMIN) 25 mg, Oral, Daily    atorvastatin (LIPITOR) 20 mg, Oral, Daily    hydroCHLOROthiazide 12.5 mg, Oral, Daily    levothyroxine 88 mcg, Oral, Daily    losartan (COZAAR) 100 mg, Oral, Daily     >>>>>>>>  Return in about 6 months (around 2025) for Recheck.  >>>>>>>>    [POCT]  No results found for this or any previous visit (from the past 24 hours).    Visit Vitals  /74   Pulse 64   Temp 98.6 °F (37 °C)   Resp 18   Ht 5' 2\" (1.575 m)   Wt 71.2 kg (157 lb)   BMI 28.72 kg/m²   OB Status Postmenopausal   Smoking Status Never   BSA 1.72 m²        Objective   /74   Pulse 64   Temp 98.6 °F (37 °C)   Resp 18   Ht 5' 2\" (1.575 m)   Wt 71.2 kg (157 lb)   BMI 28.72 kg/m²      Physical Exam  Vitals and nursing note reviewed.   Constitutional:       General: She is not in acute distress.     Appearance: She is well-developed. She is not ill-appearing.   HENT:      Head: " Normocephalic.      Right Ear: Tympanic membrane normal.      Left Ear: Tympanic membrane normal.      Nose: No congestion.     Eyes:      General: No scleral icterus.     Conjunctiva/sclera: Conjunctivae normal.     Neck:      Vascular: No carotid bruit.     Cardiovascular:      Rate and Rhythm: Normal rate and regular rhythm.      Heart sounds: No murmur heard.  Pulmonary:      Effort: Pulmonary effort is normal. No respiratory distress.      Breath sounds: No wheezing.   Abdominal:      General: There is no distension.      Palpations: Abdomen is soft.      Tenderness: There is no abdominal tenderness.     Musculoskeletal:         General: No deformity.      Cervical back: Neck supple.      Right lower leg: No edema.      Left lower leg: No edema.     Skin:     General: Skin is warm and dry.      Coloration: Skin is not jaundiced or pale.     Neurological:      Mental Status: She is alert.      Motor: No weakness.      Gait: Gait normal.     Psychiatric:         Mood and Affect: Mood normal.         Behavior: Behavior normal.         Thought Content: Thought content normal.                [1]   Family History  Problem Relation Name Age of Onset    Diabetes Sister half     Breast cancer Sister half 70    No Known Problems Sister      No Known Problems Sister      No Known Problems Sister      Alzheimer's disease Sister      Cancer Mother          possibly gallbladder     Heart disease Father      Heart attack Father      No Known Problems Brother      No Known Problems Son      No Known Problems Son      No Known Problems Son      No Known Problems Brother     [2]   Social History  Tobacco Use    Smoking status: Never     Passive exposure: Past    Smokeless tobacco: Never   Vaping Use    Vaping status: Never Used   Substance Use Topics    Alcohol use: No    Drug use: No

## 2025-06-17 NOTE — ASSESSMENT & PLAN NOTE
Stable on meds  Orders:    Comprehensive metabolic panel; Future    atenolol (TENORMIN) 25 mg tablet; Take 1 tablet (25 mg total) by mouth daily

## 2025-07-17 DIAGNOSIS — I10 BENIGN ESSENTIAL HYPERTENSION: ICD-10-CM

## 2025-07-18 RX ORDER — ATENOLOL 25 MG/1
25 TABLET ORAL DAILY
Qty: 90 TABLET | Refills: 1 | Status: SHIPPED | OUTPATIENT
Start: 2025-07-18

## 2025-07-22 ENCOUNTER — HOSPITAL ENCOUNTER (OUTPATIENT)
Dept: RADIOLOGY | Facility: HOSPITAL | Age: 80
Discharge: HOME | End: 2025-07-22
Attending: FAMILY MEDICINE
Payer: MEDICARE

## 2025-07-22 DIAGNOSIS — Z12.31 BREAST CANCER SCREENING BY MAMMOGRAM: ICD-10-CM

## 2025-07-22 PROCEDURE — 77063 BREAST TOMOSYNTHESIS BI: CPT

## 2025-07-22 PROCEDURE — 77067 SCR MAMMO BI INCL CAD: CPT
